# Patient Record
Sex: MALE | Race: ASIAN | Employment: OTHER | URBAN - METROPOLITAN AREA
[De-identification: names, ages, dates, MRNs, and addresses within clinical notes are randomized per-mention and may not be internally consistent; named-entity substitution may affect disease eponyms.]

---

## 2017-03-03 ENCOUNTER — OFFICE VISIT (OUTPATIENT)
Dept: FAMILY MEDICINE CLINIC | Facility: CLINIC | Age: 72
End: 2017-03-03

## 2017-03-03 VITALS
WEIGHT: 153 LBS | DIASTOLIC BLOOD PRESSURE: 78 MMHG | BODY MASS INDEX: 25 KG/M2 | HEART RATE: 67 BPM | SYSTOLIC BLOOD PRESSURE: 142 MMHG

## 2017-03-03 DIAGNOSIS — E11.9 DIABETES MELLITUS TYPE 2 IN NONOBESE (HCC): Primary | ICD-10-CM

## 2017-03-03 PROCEDURE — 99214 OFFICE O/P EST MOD 30 MIN: CPT | Performed by: FAMILY MEDICINE

## 2017-03-03 PROCEDURE — G0463 HOSPITAL OUTPT CLINIC VISIT: HCPCS | Performed by: FAMILY MEDICINE

## 2017-03-03 RX ORDER — IRBESARTAN AND HYDROCHLOROTHIAZIDE 300; 12.5 MG/1; MG/1
1 TABLET, FILM COATED ORAL DAILY
Qty: 90 TABLET | Refills: 3 | Status: SHIPPED | OUTPATIENT
Start: 2017-03-03 | End: 2017-05-02

## 2017-03-03 RX ORDER — IRBESARTAN AND HYDROCHLOROTHIAZIDE 300; 12.5 MG/1; MG/1
1 TABLET, FILM COATED ORAL DAILY
Qty: 90 TABLET | Refills: 3 | Status: CANCELLED | OUTPATIENT
Start: 2017-03-03

## 2017-03-03 RX ORDER — METFORMIN HYDROCHLORIDE 500 MG/1
500 TABLET, EXTENDED RELEASE ORAL NIGHTLY
Qty: 90 TABLET | Refills: 3 | Status: SHIPPED | OUTPATIENT
Start: 2017-03-03 | End: 2017-11-21

## 2017-03-03 RX ORDER — AMLODIPINE BESYLATE 10 MG/1
10 TABLET ORAL DAILY
Qty: 90 TABLET | Refills: 3 | Status: SHIPPED | OUTPATIENT
Start: 2017-03-03 | End: 2017-05-02

## 2017-03-04 ENCOUNTER — APPOINTMENT (OUTPATIENT)
Dept: LAB | Age: 72
End: 2017-03-04
Attending: FAMILY MEDICINE
Payer: MEDICARE

## 2017-03-04 DIAGNOSIS — E11.9 DIABETES MELLITUS TYPE 2 IN NONOBESE (HCC): ICD-10-CM

## 2017-03-04 LAB
ALBUMIN SERPL BCP-MCNC: 4.2 G/DL (ref 3.5–4.8)
ALBUMIN/GLOB SERPL: 1.2 {RATIO} (ref 1–2)
ALP SERPL-CCNC: 50 U/L (ref 32–100)
ALT SERPL-CCNC: 21 U/L (ref 17–63)
ANION GAP SERPL CALC-SCNC: 9 MMOL/L (ref 0–18)
AST SERPL-CCNC: 22 U/L (ref 15–41)
BILIRUB SERPL-MCNC: 1.1 MG/DL (ref 0.3–1.2)
BUN SERPL-MCNC: 21 MG/DL (ref 8–20)
BUN/CREAT SERPL: 17.8 (ref 10–20)
CALCIUM SERPL-MCNC: 9.5 MG/DL (ref 8.5–10.5)
CHLORIDE SERPL-SCNC: 99 MMOL/L (ref 95–110)
CHOLEST SERPL-MCNC: 190 MG/DL (ref 110–200)
CO2 SERPL-SCNC: 27 MMOL/L (ref 22–32)
CREAT SERPL-MCNC: 1.18 MG/DL (ref 0.5–1.5)
CREAT UR-MCNC: 134.9 MG/DL
GLOBULIN PLAS-MCNC: 3.5 G/DL (ref 2.5–3.7)
GLUCOSE SERPL-MCNC: 134 MG/DL (ref 70–99)
HBA1C MFR BLD: 6.6 % (ref 4–6)
HDLC SERPL-MCNC: 44 MG/DL
LDLC SERPL CALC-MCNC: 120 MG/DL (ref 0–99)
MICROALBUMIN UR-MCNC: 17.9 MG/DL (ref 0–1.8)
MICROALBUMIN/CREAT UR: 132.7 MG/G{CREAT} (ref 0–20)
NONHDLC SERPL-MCNC: 146 MG/DL
OSMOLALITY UR CALC.SUM OF ELEC: 285 MOSM/KG (ref 275–295)
POTASSIUM SERPL-SCNC: 4 MMOL/L (ref 3.3–5.1)
PROT SERPL-MCNC: 7.7 G/DL (ref 5.9–8.4)
SODIUM SERPL-SCNC: 135 MMOL/L (ref 136–144)
TRIGL SERPL-MCNC: 131 MG/DL (ref 1–149)

## 2017-03-04 PROCEDURE — 82570 ASSAY OF URINE CREATININE: CPT

## 2017-03-04 PROCEDURE — 82043 UR ALBUMIN QUANTITATIVE: CPT

## 2017-03-04 PROCEDURE — 83036 HEMOGLOBIN GLYCOSYLATED A1C: CPT

## 2017-03-04 PROCEDURE — 36415 COLL VENOUS BLD VENIPUNCTURE: CPT

## 2017-03-04 PROCEDURE — 80061 LIPID PANEL: CPT

## 2017-03-04 PROCEDURE — 80053 COMPREHEN METABOLIC PANEL: CPT

## 2017-03-20 ENCOUNTER — TELEPHONE (OUTPATIENT)
Dept: INTERNAL MEDICINE CLINIC | Facility: CLINIC | Age: 72
End: 2017-03-20

## 2017-04-24 PROBLEM — N18.1 CHRONIC KIDNEY DISEASE (CKD), STAGE I: Status: ACTIVE | Noted: 2017-04-24

## 2017-04-24 PROBLEM — I77.1 TORTUOUS AORTA (HCC): Status: ACTIVE | Noted: 2017-04-24

## 2017-04-24 PROBLEM — I10 HYPERTENSION: Status: ACTIVE | Noted: 2017-04-24

## 2017-04-24 PROBLEM — N52.9 ERECTILE DYSFUNCTION: Status: ACTIVE | Noted: 2017-04-24

## 2017-04-24 PROBLEM — I77.1 TORTUOUS AORTA: Status: ACTIVE | Noted: 2017-04-24

## 2017-04-24 PROBLEM — E11.21 DIABETIC NEPHROPATHY (HCC): Status: ACTIVE | Noted: 2017-04-24

## 2017-05-02 ENCOUNTER — OFFICE VISIT (OUTPATIENT)
Dept: FAMILY MEDICINE CLINIC | Facility: CLINIC | Age: 72
End: 2017-05-02

## 2017-05-02 VITALS
HEIGHT: 66 IN | BODY MASS INDEX: 25.88 KG/M2 | DIASTOLIC BLOOD PRESSURE: 68 MMHG | HEART RATE: 74 BPM | WEIGHT: 161 LBS | SYSTOLIC BLOOD PRESSURE: 117 MMHG

## 2017-05-02 DIAGNOSIS — I12.9 RENAL HYPERTENSION, STAGE 1-4 OR UNSPECIFIED CHRONIC KIDNEY DISEASE: Primary | ICD-10-CM

## 2017-05-02 DIAGNOSIS — E11.9 DIABETES MELLITUS TYPE 2 IN NONOBESE (HCC): ICD-10-CM

## 2017-05-02 DIAGNOSIS — E11.21 DIABETIC NEPHROPATHY ASSOCIATED WITH TYPE 2 DIABETES MELLITUS (HCC): ICD-10-CM

## 2017-05-02 DIAGNOSIS — I10 ESSENTIAL HYPERTENSION: ICD-10-CM

## 2017-05-02 DIAGNOSIS — Z00.00 ENCOUNTER FOR ANNUAL HEALTH EXAMINATION: ICD-10-CM

## 2017-05-02 DIAGNOSIS — I77.1 TORTUOUS AORTA (HCC): ICD-10-CM

## 2017-05-02 DIAGNOSIS — Z12.11 SCREENING FOR COLON CANCER: ICD-10-CM

## 2017-05-02 DIAGNOSIS — Z87.891 HISTORY OF TOBACCO ABUSE: ICD-10-CM

## 2017-05-02 DIAGNOSIS — N52.9 ERECTILE DYSFUNCTION, UNSPECIFIED ERECTILE DYSFUNCTION TYPE: ICD-10-CM

## 2017-05-02 DIAGNOSIS — E11.22 TYPE 2 DIABETES MELLITUS WITH STAGE 1 CHRONIC KIDNEY DISEASE, WITHOUT LONG-TERM CURRENT USE OF INSULIN (HCC): ICD-10-CM

## 2017-05-02 DIAGNOSIS — Z12.5 SCREENING FOR PROSTATE CANCER: ICD-10-CM

## 2017-05-02 DIAGNOSIS — N18.1 TYPE 2 DIABETES MELLITUS WITH STAGE 1 CHRONIC KIDNEY DISEASE, WITHOUT LONG-TERM CURRENT USE OF INSULIN (HCC): ICD-10-CM

## 2017-05-02 PROCEDURE — 96160 PT-FOCUSED HLTH RISK ASSMT: CPT | Performed by: FAMILY MEDICINE

## 2017-05-02 RX ORDER — AMLODIPINE BESYLATE 5 MG/1
5 TABLET ORAL DAILY
Qty: 90 TABLET | Refills: 3 | Status: SHIPPED | OUTPATIENT
Start: 2017-05-02 | End: 2017-11-21

## 2017-05-02 NOTE — PROGRESS NOTES
HPI:   Maddy Guevara is a 70year old male who presents for a Medicare Subsequent Annual Wellness visit (Pt already had Initial Annual Wellness).    Retired.   His last annual assessment has been over 1 year: Annual Physical due on 08/28/ has a past medical history of Unspecified essential hypertension; Osteoporosis (2010); and Diabetes ([address] He  has past surgical history that includes electrocardiogram, complete (08-).     His family history includes Diabetes in his mother; Heart restaurant:  No   I get confused about where sounds come from:  No I misunderstand some   words in a sentence and need to ask people to repeat themselves:  No   I especially have trouble understanding the speech of women and children:    No I have trouble Lymph nodes: Cervical, supraclavicular, and axillary nodes normal   Neurologic: Normal            SUGGESTED VACCINATIONS - Influenza, Pneumococcal, Zoster, Tetanus     Immunization History   Administered Date(s) Administered   • Fluzone Vaccine Medicare No    Has your appetite been poor?: No    How does the patient maintain a good energy level?: Appropriate Exercise    How would you describe your daily physical activity?: Moderate    How would you describe your current health state?: Good    How do you ma Please go to \"Cognitive Assessment\" under Medicare Assessment section in Charting, test patient and document. Then, refresh your progress note to see your input here.   Cognitive Assessment     What day of the week is this?: Correct    What month i or any previous visit.  Update Immunization Activity if applicable    Pneumoccocal 13 (Prevnar)   Orders placed or performed in visit on 03/08/16  -PNEUMOCOCCAL VACC, 13 ANTHONY IM         Pneumococcal 23 (Pneumovax) No orders found for this or any previous vis Team:  Irish Herrera DO as PCP - General (Family Practice)  Irish Herrera DO as PCP - Family Practitioner (Family Practice)  Maury Coronado DO (INFECTIOUS DISEASES)    Patient Active Problem List:     Type II diabetes mellitus ([facility]     Dysli mother. SOCIAL HISTORY:   He  reports that he has quit smoking. He does not have any smokeless tobacco history on file. He reports that he drinks alcohol. His drug history is not on file.      REVIEW OF SYSTEMS:   GENERAL: feels well otherwise  SKIN: umm meeting or place of Rastafarian:  No   Many people I talk to seem to mumble (or don't speak clearly):  No People   get annoyed because I misunderstand what they say:  No   I misunderstand what others are saying and make inappropriate responses:    No I avoid • Pneumococcal (Prevnar 13) 03/08/2016       ASSESSMENT AND OTHER RELEVANT CHRONIC CONDITIONS:   Kirstin Chacko is a 70year old male who presents for a Medicare Assessment.      PLAN SUMMARY:   Diagnoses and all orders for this visit:    Medicare a maintain a good energy level?: Appropriate Exercise    How would you describe your daily physical activity?: Moderate    How would you describe your current health state?: Good    How do you maintain positive mental well-being?: Visiting Friends    If you section in Charting, test patient and document. Then, refresh your progress note to see your input here.   Cognitive Assessment     What day of the week is this?: Correct    What month is it?: Correct    What year is it?: Correct    Recall \"Ball\": QKDN Pneumoccocal 13 (Prevnar)   Orders placed or performed in visit on 03/08/16  -PNEUMOCOCCAL VACC, 13 ANTHONY IM         Pneumococcal 23 (Pneumovax) No orders found for this or any previous visit.      Hepatitis B No orders found for this or any previous visit

## 2017-05-02 NOTE — PATIENT INSTRUCTIONS
Jonnathan Marks's SCREENING SCHEDULE   Tests on this list are recommended by your physician but may not be covered, or covered at this frequency, by your insurer. Please check with your insurance carrier before scheduling to verify coverage.     PREV Abdominal aortic aneurysm screening (once between ages 73-68)  No results found for this or any previous visit.  Limited to patients who meet one of the following criteria:   • Men who are 73-68 years old and have smoked more than 100 cigarettes in their li received Factor VIII or IX concentrates   Clients of institutions for the mentally retarded   Persons who live in the same house as a HepB virus carrier   Homosexual men   Illicit injectable drug abusers     Tetanus Toxoid- Only covered with a cut with met flowsheet data found.     Fasting Blood Sugar (FSB)   Patient must be diagnosed with one of the following:   • Hypertension   • Dyslipidemia   • Obesity (BMI ³30 kg/m2)   • Previous elevated impaired FBS or GTT   … or any two of the following:   • Overweigh found. Fecal Occult Blood   Covered Annually No results found for: FOB, OCCULTSTOOL No flowsheet data found.      Barium Enema-   uncomfortable but covered  Covered but uncomfortable   Glaucoma Screening      Ophthalmology Visit   Covered annually for D Recommended Websites for Advanced Directives    [email or web address] org/publications/[email or web address]/personal_[email or web address]. pdf  An information packet, including necessary form from the [employer] website. http://www. [place]state. [place]/public/books/a

## 2017-05-18 ENCOUNTER — HOSPITAL ENCOUNTER (OUTPATIENT)
Dept: ULTRASOUND IMAGING | Age: 72
Discharge: HOME | End: 2017-05-18
Attending: FAMILY MEDICINE
Payer: MEDICARE

## 2017-05-18 DIAGNOSIS — Z87.891 HISTORY OF TOBACCO ABUSE: ICD-10-CM

## 2017-05-18 DIAGNOSIS — I10 ESSENTIAL HYPERTENSION: ICD-10-CM

## 2017-05-18 PROCEDURE — 76770 US EXAM ABDO BACK WALL COMP: CPT | Performed by: FAMILY MEDICINE

## 2017-08-02 ENCOUNTER — APPOINTMENT (OUTPATIENT)
Dept: LAB | Age: 72
End: 2017-08-02
Attending: FAMILY MEDICINE
Payer: MEDICARE

## 2017-08-02 DIAGNOSIS — E11.9 DIABETES MELLITUS TYPE 2 IN NONOBESE (HCC): ICD-10-CM

## 2017-08-02 DIAGNOSIS — Z12.5 SCREENING FOR PROSTATE CANCER: ICD-10-CM

## 2017-08-02 LAB
ALBUMIN SERPL BCP-MCNC: 4.2 G/DL (ref 3.5–4.8)
ALBUMIN/GLOB SERPL: 1.4 {RATIO} (ref 1–2)
ALP SERPL-CCNC: 50 U/L (ref 32–100)
ALT SERPL-CCNC: 16 U/L (ref 17–63)
ANION GAP SERPL CALC-SCNC: 8 MMOL/L (ref 0–18)
AST SERPL-CCNC: 20 U/L (ref 15–41)
BILIRUB SERPL-MCNC: 1.1 MG/DL (ref 0.3–1.2)
BUN SERPL-MCNC: 15 MG/DL (ref 8–20)
BUN/CREAT SERPL: 15.8 (ref 10–20)
CALCIUM SERPL-MCNC: 9.4 MG/DL (ref 8.5–10.5)
CHLORIDE SERPL-SCNC: 99 MMOL/L (ref 95–110)
CHOLEST SERPL-MCNC: 175 MG/DL (ref 110–200)
CO2 SERPL-SCNC: 27 MMOL/L (ref 22–32)
CREAT SERPL-MCNC: 0.95 MG/DL (ref 0.5–1.5)
CREAT UR-MCNC: 22.1 MG/DL
GLOBULIN PLAS-MCNC: 3.1 G/DL (ref 2.5–3.7)
GLUCOSE SERPL-MCNC: 115 MG/DL (ref 70–99)
HBA1C MFR BLD: 6.4 % (ref 4–6)
HDLC SERPL-MCNC: 40 MG/DL
LDLC SERPL CALC-MCNC: 105 MG/DL (ref 0–99)
MICROALBUMIN UR-MCNC: 2.5 MG/DL (ref 0–1.8)
MICROALBUMIN/CREAT UR: 113.1 MG/G{CREAT} (ref 0–20)
NONHDLC SERPL-MCNC: 135 MG/DL
OSMOLALITY UR CALC.SUM OF ELEC: 280 MOSM/KG (ref 275–295)
POTASSIUM SERPL-SCNC: 4.1 MMOL/L (ref 3.3–5.1)
PROT SERPL-MCNC: 7.3 G/DL (ref 5.9–8.4)
PSA SERPL-MCNC: 0.7 NG/ML (ref 0–4)
SODIUM SERPL-SCNC: 134 MMOL/L (ref 136–144)
TRIGL SERPL-MCNC: 152 MG/DL (ref 1–149)

## 2017-08-02 PROCEDURE — 80061 LIPID PANEL: CPT

## 2017-08-02 PROCEDURE — 82570 ASSAY OF URINE CREATININE: CPT

## 2017-08-02 PROCEDURE — 80053 COMPREHEN METABOLIC PANEL: CPT

## 2017-08-02 PROCEDURE — 83036 HEMOGLOBIN GLYCOSYLATED A1C: CPT

## 2017-08-02 PROCEDURE — 82043 UR ALBUMIN QUANTITATIVE: CPT

## 2017-08-02 PROCEDURE — 36415 COLL VENOUS BLD VENIPUNCTURE: CPT

## 2017-11-21 ENCOUNTER — OFFICE VISIT (OUTPATIENT)
Dept: FAMILY MEDICINE CLINIC | Facility: CLINIC | Age: 72
End: 2017-11-21

## 2017-11-21 VITALS
BODY MASS INDEX: 24 KG/M2 | WEIGHT: 149 LBS | SYSTOLIC BLOOD PRESSURE: 145 MMHG | HEART RATE: 73 BPM | DIASTOLIC BLOOD PRESSURE: 72 MMHG

## 2017-11-21 DIAGNOSIS — E11.9 TYPE 2 DIABETES MELLITUS WITHOUT COMPLICATION, WITHOUT LONG-TERM CURRENT USE OF INSULIN (HCC): ICD-10-CM

## 2017-11-21 DIAGNOSIS — Z23 NEED FOR VACCINATION: Primary | ICD-10-CM

## 2017-11-21 PROCEDURE — 90653 IIV ADJUVANT VACCINE IM: CPT | Performed by: FAMILY MEDICINE

## 2017-11-21 PROCEDURE — 99214 OFFICE O/P EST MOD 30 MIN: CPT | Performed by: FAMILY MEDICINE

## 2017-11-21 PROCEDURE — G0008 ADMIN INFLUENZA VIRUS VAC: HCPCS | Performed by: FAMILY MEDICINE

## 2017-11-21 PROCEDURE — G0463 HOSPITAL OUTPT CLINIC VISIT: HCPCS | Performed by: FAMILY MEDICINE

## 2017-11-21 RX ORDER — AMLODIPINE BESYLATE 5 MG/1
5 TABLET ORAL DAILY
Qty: 90 TABLET | Refills: 3 | Status: SHIPPED | OUTPATIENT
Start: 2017-11-21 | End: 2018-04-06

## 2017-11-21 RX ORDER — IRBESARTAN AND HYDROCHLOROTHIAZIDE 300; 12.5 MG/1; MG/1
1 TABLET, FILM COATED ORAL DAILY
Qty: 90 TABLET | Refills: 3 | Status: SHIPPED | OUTPATIENT
Start: 2017-11-21 | End: 2018-04-04

## 2017-11-21 RX ORDER — SILDENAFIL 100 MG/1
100 TABLET, FILM COATED ORAL
Qty: 4 TABLET | Refills: 11 | Status: SHIPPED | OUTPATIENT
Start: 2017-11-21 | End: 2018-04-19

## 2017-11-21 RX ORDER — METFORMIN HYDROCHLORIDE 500 MG/1
500 TABLET, EXTENDED RELEASE ORAL NIGHTLY
Qty: 90 TABLET | Refills: 3 | Status: SHIPPED | OUTPATIENT
Start: 2017-11-21 | End: 2018-03-20

## 2017-11-21 NOTE — PROGRESS NOTES
Diabetic Visit  Doing well  Stopped eating rice. Patient denies problems with their medication. Denies ulcers, chest pain, dyspnea on exertion.     Ht rrr no M no S3 S4  Lung clear no wheeze  abd soft nontender  Carotids without bruit  Ext normal mono

## 2017-12-01 ENCOUNTER — APPOINTMENT (OUTPATIENT)
Dept: LAB | Age: 72
End: 2017-12-01
Attending: FAMILY MEDICINE
Payer: MEDICARE

## 2017-12-01 PROCEDURE — 83036 HEMOGLOBIN GLYCOSYLATED A1C: CPT | Performed by: FAMILY MEDICINE

## 2017-12-01 PROCEDURE — 82570 ASSAY OF URINE CREATININE: CPT | Performed by: FAMILY MEDICINE

## 2017-12-01 PROCEDURE — 36415 COLL VENOUS BLD VENIPUNCTURE: CPT | Performed by: FAMILY MEDICINE

## 2017-12-01 PROCEDURE — 82043 UR ALBUMIN QUANTITATIVE: CPT | Performed by: FAMILY MEDICINE

## 2017-12-01 PROCEDURE — 80061 LIPID PANEL: CPT | Performed by: FAMILY MEDICINE

## 2017-12-01 PROCEDURE — 80053 COMPREHEN METABOLIC PANEL: CPT | Performed by: FAMILY MEDICINE

## 2017-12-02 ENCOUNTER — TELEPHONE (OUTPATIENT)
Dept: OTHER | Age: 72
End: 2017-12-02

## 2017-12-02 DIAGNOSIS — Z13.29 THYROID DISORDER SCREENING: ICD-10-CM

## 2017-12-02 DIAGNOSIS — E11.9 DIABETES MELLITUS TYPE 2, NONINSULIN DEPENDENT (HCC): ICD-10-CM

## 2017-12-02 DIAGNOSIS — E78.5 HYPERLIPIDEMIA, UNSPECIFIED HYPERLIPIDEMIA TYPE: Primary | ICD-10-CM

## 2017-12-02 NOTE — TELEPHONE ENCOUNTER
Reviewed lab results with pt along with Dr [facility] recommendations. Pt verbalized understanding and agreed with md plan. New labs orders generated and copies mailed to pts' home address on file to complete Feb 28th, 2018 in 12 hour fasting state.

## 2017-12-02 NOTE — TELEPHONE ENCOUNTER
----- Message from Willard Heath DO sent at 12/1/2017  2:42 PM CST -----  Chol and diabetse are well controlled. Sodium low. liberalize salt intake  Repeat diabetic panel with tsh in 3 mo.

## 2018-02-08 ENCOUNTER — TELEPHONE (OUTPATIENT)
Dept: FAMILY MEDICINE CLINIC | Facility: CLINIC | Age: 73
End: 2018-02-08

## 2018-02-08 NOTE — TELEPHONE ENCOUNTER
Pt informed is eligible for Medicare Annual Health Assessment appointment and offered assistance in scheduling, [place] to schedule.

## 2018-03-21 RX ORDER — METFORMIN HYDROCHLORIDE 500 MG/1
TABLET, EXTENDED RELEASE ORAL
Qty: 90 TABLET | Refills: 0 | Status: SHIPPED | OUTPATIENT
Start: 2018-03-21 | End: 2018-04-06

## 2018-03-21 NOTE — TELEPHONE ENCOUNTER
Diabetes Medications  Protocol Criteria:  · Appointment scheduled in the past 6 months or the next 3 months  · A1C < 7.5 in the past 6 months  · Creatinine in the past 12 months  · Creatinine result < 1.5   Recent Outpatient Visits            4 months ago

## 2018-04-04 RX ORDER — IRBESARTAN AND HYDROCHLOROTHIAZIDE 300; 12.5 MG/1; MG/1
1 TABLET, FILM COATED ORAL DAILY
Qty: 30 TABLET | Refills: 0 | Status: SHIPPED | OUTPATIENT
Start: 2018-04-04 | End: 2018-04-11

## 2018-04-04 NOTE — TELEPHONE ENCOUNTER
Patient requesting refill request for     Irbesartan-Hydrochlorothiazide 300-12.5 MG Oral Tab Take 1 tablet by mouth daily. Disp: 90 tablet Rfl: 3     Patient states [facility] pharmacy advised Dr. Ramin Houston put hold on the medication.      Patient is running ou

## 2018-04-04 NOTE — TELEPHONE ENCOUNTER
Pt called back & stated Nikki Hernández is putting a hold on his rx ref for irbesartan hctz, he is not sure why. Pt informed last ref was on 11-21-17 # 90 +3. He will verify with them why they are not sending it, pt has 4 pills left.     Pt is now requesting a mon Refills    Irbesartan-Hydrochlorothiazide 300-12.5 MG Oral Tab 30 tablet 0     Sig: Take 1 tablet by mouth daily. LR 11-21-17 # 90 +3  Refill approved per protocol.     Future Appointments  Date Time Provider Kasey Amin   4/17/2018 1:10 PM Ce

## 2018-04-09 RX ORDER — METFORMIN HYDROCHLORIDE 500 MG/1
TABLET, EXTENDED RELEASE ORAL
Qty: 90 TABLET | Refills: 0 | Status: SHIPPED | OUTPATIENT
Start: 2018-04-09 | End: 2018-04-19

## 2018-04-09 RX ORDER — AMLODIPINE BESYLATE 5 MG/1
TABLET ORAL
Qty: 90 TABLET | Refills: 0 | Status: SHIPPED | OUTPATIENT
Start: 2018-04-09 | End: 2018-04-19

## 2018-04-09 NOTE — TELEPHONE ENCOUNTER
Hypertensive Medications  Protocol Criteria:  · Appointment scheduled in the past 6 months or in the next 3 months  · BMP or CMP in the past 12 months  · Creatinine result < 2  Recent Outpatient Visits            4 months ago Need for vaccination    Yen Pickett months ago Renal hypertension, stage 1-4 or unspecified chronic kidney disease    [facility], [place] [address], [address] [place] [address]    Office Visit    1 year ago Diabetes mellitus type 2 in nonobese [facility])    [facility], [place] [address], [place]

## 2018-04-10 NOTE — TELEPHONE ENCOUNTER
Rec'd call from [address] for refill of Irbesartan-HCTZ. The pt's current prescription  last month. (order was written on 17 for 90 tab/3 refills)     Is pt to continue this med? Prescription is pended, please advise.

## 2018-04-10 NOTE — TELEPHONE ENCOUNTER
Pt request refill for meds below to be refilled at [facility] mail order on file. •  Irbesartan-Hydrochlorothiazide 300-12.5 MG Oral Tab, Take 1 tablet by mouth daily. , Disp: 30 tablet, Rfl: 0

## 2018-04-11 RX ORDER — IRBESARTAN AND HYDROCHLOROTHIAZIDE 300; 12.5 MG/1; MG/1
1 TABLET, FILM COATED ORAL DAILY
Qty: 90 TABLET | Refills: 0 | Status: SHIPPED | OUTPATIENT
Start: 2018-04-11 | End: 2018-04-19

## 2018-04-11 NOTE — TELEPHONE ENCOUNTER
Refilled per Epic protocol.     Hypertensive Medications  Protocol Criteria:  · Appointment scheduled in the past 6 months or in the next 3 months  · BMP or CMP in the past 12 months  · Creatinine result < 2  Recent Outpatient Visits            4 months ago

## 2018-04-12 RX ORDER — IRBESARTAN AND HYDROCHLOROTHIAZIDE 300; 12.5 MG/1; MG/1
1 TABLET, FILM COATED ORAL DAILY
Qty: 90 TABLET | Refills: 11 | Status: SHIPPED | OUTPATIENT
Start: 2018-04-12 | End: 2018-04-19

## 2018-04-19 ENCOUNTER — OFFICE VISIT (OUTPATIENT)
Dept: FAMILY MEDICINE CLINIC | Facility: CLINIC | Age: 73
End: 2018-04-19

## 2018-04-19 VITALS
HEART RATE: 73 BPM | SYSTOLIC BLOOD PRESSURE: 130 MMHG | TEMPERATURE: 97 F | HEIGHT: 66 IN | WEIGHT: 148 LBS | DIASTOLIC BLOOD PRESSURE: 83 MMHG | BODY MASS INDEX: 23.78 KG/M2

## 2018-04-19 DIAGNOSIS — I10 ESSENTIAL HYPERTENSION: ICD-10-CM

## 2018-04-19 DIAGNOSIS — I77.1 TORTUOUS AORTA (HCC): ICD-10-CM

## 2018-04-19 DIAGNOSIS — E11.21 DIABETIC NEPHROPATHY ASSOCIATED WITH TYPE 2 DIABETES MELLITUS (HCC): Primary | ICD-10-CM

## 2018-04-19 DIAGNOSIS — Z23 NEED FOR VACCINATION: ICD-10-CM

## 2018-04-19 PROCEDURE — G0009 ADMIN PNEUMOCOCCAL VACCINE: HCPCS | Performed by: FAMILY MEDICINE

## 2018-04-19 PROCEDURE — 99212 OFFICE O/P EST SF 10 MIN: CPT | Performed by: FAMILY MEDICINE

## 2018-04-19 PROCEDURE — 90732 PPSV23 VACC 2 YRS+ SUBQ/IM: CPT | Performed by: FAMILY MEDICINE

## 2018-04-19 PROCEDURE — 99214 OFFICE O/P EST MOD 30 MIN: CPT | Performed by: FAMILY MEDICINE

## 2018-04-19 RX ORDER — AMLODIPINE BESYLATE 5 MG/1
5 TABLET ORAL
Qty: 90 TABLET | Refills: 3 | Status: SHIPPED | OUTPATIENT
Start: 2018-04-19 | End: 2019-03-19

## 2018-04-19 RX ORDER — SILDENAFIL 100 MG/1
100 TABLET, FILM COATED ORAL
Qty: 8 TABLET | Refills: 11 | Status: SHIPPED | OUTPATIENT
Start: 2018-04-19 | End: 2020-09-24 | Stop reason: ALTCHOICE

## 2018-04-19 RX ORDER — IRBESARTAN AND HYDROCHLOROTHIAZIDE 300; 12.5 MG/1; MG/1
1 TABLET, FILM COATED ORAL DAILY
Qty: 90 TABLET | Refills: 3 | Status: SHIPPED | OUTPATIENT
Start: 2018-04-19 | End: 2019-03-19

## 2018-04-19 RX ORDER — METFORMIN HYDROCHLORIDE 500 MG/1
TABLET, EXTENDED RELEASE ORAL
Qty: 90 TABLET | Refills: 3 | Status: SHIPPED | OUTPATIENT
Start: 2018-04-19 | End: 2019-03-05

## 2018-04-19 NOTE — PROGRESS NOTES
Here for f/u on htn and diabetes. \"My sugars are good during the day and are slightly high in the morning (110-130's)\"  No hypoglycemic episodes  Patient has lost some weight  Has been following his diabetic diet.     Patient's [facility]  Continue on htn medication.     4. Need for vaccination  given  - PNEUMOCOCCAL IMM (PNEUMOVAX)

## 2018-05-01 ENCOUNTER — APPOINTMENT (OUTPATIENT)
Dept: LAB | Age: 73
End: 2018-05-01
Attending: FAMILY MEDICINE
Payer: MEDICARE

## 2018-05-01 PROCEDURE — 36415 COLL VENOUS BLD VENIPUNCTURE: CPT | Performed by: FAMILY MEDICINE

## 2018-05-01 PROCEDURE — 80061 LIPID PANEL: CPT | Performed by: FAMILY MEDICINE

## 2018-05-01 PROCEDURE — 83036 HEMOGLOBIN GLYCOSYLATED A1C: CPT | Performed by: FAMILY MEDICINE

## 2018-05-01 PROCEDURE — 80053 COMPREHEN METABOLIC PANEL: CPT | Performed by: FAMILY MEDICINE

## 2018-05-01 PROCEDURE — 82570 ASSAY OF URINE CREATININE: CPT | Performed by: FAMILY MEDICINE

## 2018-05-01 PROCEDURE — 84443 ASSAY THYROID STIM HORMONE: CPT | Performed by: FAMILY MEDICINE

## 2018-05-01 PROCEDURE — 82043 UR ALBUMIN QUANTITATIVE: CPT | Performed by: FAMILY MEDICINE

## 2018-05-11 ENCOUNTER — TELEPHONE (OUTPATIENT)
Dept: OTHER | Age: 73
End: 2018-05-11

## 2018-05-11 DIAGNOSIS — E78.00 ELEVATED CHOLESTEROL: Primary | ICD-10-CM

## 2018-05-11 RX ORDER — ROSUVASTATIN CALCIUM 5 MG/1
5 TABLET, COATED ORAL NIGHTLY
Qty: 90 TABLET | Refills: 3 | Status: SHIPPED | OUTPATIENT
Start: 2018-05-11 | End: 2018-09-12

## 2018-05-11 NOTE — TELEPHONE ENCOUNTER
Pt was inform of Dr. Linn 16 message below and he verbalized understanding. I have ordered he labs and medication.     From: Ramone Wesley DO   Sent: 5/8/2018   8:07 AM   To: Em Fm Lmb Lpn/Cma     Patient's kidney function remains stable.  Thyroid was nor

## 2018-07-12 ENCOUNTER — OFFICE VISIT (OUTPATIENT)
Dept: FAMILY MEDICINE CLINIC | Facility: CLINIC | Age: 73
End: 2018-07-12

## 2018-07-12 VITALS
DIASTOLIC BLOOD PRESSURE: 86 MMHG | SYSTOLIC BLOOD PRESSURE: 144 MMHG | HEART RATE: 69 BPM | BODY MASS INDEX: 23.78 KG/M2 | WEIGHT: 148 LBS | HEIGHT: 66 IN

## 2018-07-12 DIAGNOSIS — B35.4 TINEA CORPORIS: ICD-10-CM

## 2018-07-12 DIAGNOSIS — B36.0 TINEA VERSICOLOR: Primary | ICD-10-CM

## 2018-07-12 PROCEDURE — 99213 OFFICE O/P EST LOW 20 MIN: CPT | Performed by: NURSE PRACTITIONER

## 2018-07-12 RX ORDER — SELENIUM SULFIDE 2.5 MG/100ML
LOTION TOPICAL
Qty: 150 ML | Refills: 3 | Status: SHIPPED | OUTPATIENT
Start: 2018-07-12 | End: 2020-06-03 | Stop reason: ALTCHOICE

## 2018-07-12 NOTE — PROGRESS NOTES
HPI  Pt here for rash on bilat forearms and left hair line; rash on legs are scaly. S/s started about 3 weeks ago. Review of Systems   Constitutional: Negative for activity change, fatigue and fever.    Respiratory: Negative for cough, chest tightness and MetFORMIN HCl  MG Oral Tablet 24 Hr TAKE 1 TABLET EVERY NIGHT Disp: 90 tablet Rfl: 3   Irbesartan-Hydrochlorothiazide 300-12.5 MG Oral Tab Take 1 tablet by mouth daily.  Disp: 90 tablet Rfl: 3   AmLODIPine Besylate 5 MG Oral Tab Take 1 tablet (5 mg to

## 2018-08-22 ENCOUNTER — NURSE TRIAGE (OUTPATIENT)
Dept: OTHER | Age: 73
End: 2018-08-22

## 2018-08-22 DIAGNOSIS — B36.0 TINEA VERSICOLOR: Primary | ICD-10-CM

## 2018-08-22 DIAGNOSIS — B35.4 TINEA CORPORIS: ICD-10-CM

## 2018-08-22 NOTE — TELEPHONE ENCOUNTER
Action Requested: Summary for Provider     []  Critical Lab, Recommendations Needed  [] Need Additional Advice  []   FYI    []   Need Orders  [] Need Medications Sent to Pharmacy  []  Other     SUMMARY: LOV 07/12 for same rash.   Pt was given lotion which h

## 2018-08-22 NOTE — TELEPHONE ENCOUNTER
Pts' dermatology referral approved, pt informed and given derm tel#[phone] to proceed with appt. Pt agreed with plan.

## 2018-09-07 ENCOUNTER — APPOINTMENT (OUTPATIENT)
Dept: LAB | Age: 73
End: 2018-09-07
Attending: FAMILY MEDICINE
Payer: MEDICARE

## 2018-09-07 DIAGNOSIS — E78.00 ELEVATED CHOLESTEROL: ICD-10-CM

## 2018-09-07 PROCEDURE — 80053 COMPREHEN METABOLIC PANEL: CPT

## 2018-09-07 PROCEDURE — 83036 HEMOGLOBIN GLYCOSYLATED A1C: CPT

## 2018-09-07 PROCEDURE — 82570 ASSAY OF URINE CREATININE: CPT

## 2018-09-07 PROCEDURE — 80061 LIPID PANEL: CPT

## 2018-09-07 PROCEDURE — 82043 UR ALBUMIN QUANTITATIVE: CPT

## 2018-09-07 PROCEDURE — 36415 COLL VENOUS BLD VENIPUNCTURE: CPT

## 2018-09-11 ENCOUNTER — TELEPHONE (OUTPATIENT)
Dept: FAMILY MEDICINE CLINIC | Facility: CLINIC | Age: 73
End: 2018-09-11

## 2018-09-11 DIAGNOSIS — E78.00 ELEVATED CHOLESTEROL: Primary | ICD-10-CM

## 2018-09-11 NOTE — TELEPHONE ENCOUNTER
Spoke with patient and informed him of his results from below and of the plan of care. Patient voiced understanding and confirmed he stopped taking his rosuvastatin some time ago because it was giving him leg pains.  Message routed to provider to confirm if

## 2018-09-11 NOTE — TELEPHONE ENCOUNTER
----- Message from Amanda Small DO sent at 9/7/2018  3:04 PM CDT -----  Blood sugar was under control continue on the current medication regimen cholesterol showed mildly elevated bad cholesterol.   Increase rosuvastatin to 10 mg 1 p.o. nightly #90 wi

## 2018-09-12 RX ORDER — SIMVASTATIN 5 MG
5 TABLET ORAL NIGHTLY
Qty: 90 TABLET | Refills: 3 | Status: SHIPPED | OUTPATIENT
Start: 2018-09-12 | End: 2019-05-06

## 2018-09-12 NOTE — TELEPHONE ENCOUNTER
Pt informed. Verbalized good understanding of all with intent to comply. Pharmacy confirmed. rx sent.

## 2018-09-13 ENCOUNTER — OFFICE VISIT (OUTPATIENT)
Dept: DERMATOLOGY CLINIC | Facility: CLINIC | Age: 73
End: 2018-09-13

## 2018-09-13 DIAGNOSIS — L30.9 DERMATITIS: ICD-10-CM

## 2018-09-13 DIAGNOSIS — L80 VITILIGO: Primary | ICD-10-CM

## 2018-09-13 PROCEDURE — 99202 OFFICE O/P NEW SF 15 MIN: CPT | Performed by: DERMATOLOGY

## 2018-09-13 RX ORDER — KETOCONAZOLE 20 MG/G
CREAM TOPICAL
COMMUNITY
Start: 2018-08-08 | End: 2020-06-03 | Stop reason: ALTCHOICE

## 2018-09-24 NOTE — PROGRESS NOTES
Es Christianson is a 68year old male.     Patient presents with:  Derm Problem: NEW PT here for eval of white blotches on his skin he noticed about 2 mo ago states he was seen by his PCP and was given Selenium Sulfidie 2.5% states he hasnt noticed a per week for 2 weeks then once a week as needed Disp: 150 mL Rfl: 3   Sildenafil Citrate 100 MG Oral Tab Take 1 tablet (100 mg total) by mouth daily as needed for Erectile Dysfunction.  Disp: 8 tablet Rfl: 11   MetFORMIN HCl  MG Oral Tablet 24 Hr TAKE (Coffee, Tea) 2 cups daily        Occupational Exposure: Not Asked        Hobby Hazards: Not Asked        Sleep Concern: Not Asked        Stress Concern: Not Asked        Weight Concern: Not Asked        Special Diet: Not Asked        Back Care: Not Asked hair, external eyes, including conjunctival mucosa, eyelids, oral mucosa, external ears, back, chest, abdomen, bilateral arms, bilateral legs, palms.         Remarkable for multiple depigmented patches over left clavicle left forearm    Exam otherwise signi Requested Prescriptions     Signed Prescriptions Disp Refills   • triamcinolone acetonide 0.1 % External Cream 454 g 3     Sig: Apply topically 2 (two) times daily.  Apply bid       Vitiligo  (primary encounter diagnosis)    No orders of the defined types

## 2018-11-15 ENCOUNTER — TELEPHONE (OUTPATIENT)
Dept: FAMILY MEDICINE CLINIC | Facility: CLINIC | Age: 73
End: 2018-11-15

## 2018-11-15 DIAGNOSIS — L80 VITILIGO: Primary | ICD-10-CM

## 2018-11-15 NOTE — TELEPHONE ENCOUNTER
Dr. Karri Castillo,    Patient called requesting a referral for Derm. Please advise and sign off on referral if you agree.       Thank you, Juliette Wesley Small-Referral Specialist.

## 2019-01-22 ENCOUNTER — PATIENT OUTREACH (OUTPATIENT)
Dept: CASE MANAGEMENT | Age: 74
End: 2019-01-22

## 2019-01-22 NOTE — PROGRESS NOTES
Outreached to patient in regards to scheduling Medicare Annual exam (AHA). Left message for patient to return my call at ext. [phone]. Patient is eligible April 20019. Thank you.

## 2019-02-14 ENCOUNTER — PATIENT OUTREACH (OUTPATIENT)
Dept: CASE MANAGEMENT | Age: 74
End: 2019-02-14

## 2019-02-14 NOTE — PROGRESS NOTES
Second outreached to patient in regards to scheduling Medicare Annual exam (AHA). Left message for patient to return my call at ext. [phone]. Patient is eligible in April. Thank you.

## 2019-03-05 RX ORDER — METFORMIN HYDROCHLORIDE 500 MG/1
TABLET, EXTENDED RELEASE ORAL
Qty: 90 TABLET | Refills: 3 | Status: SHIPPED | OUTPATIENT
Start: 2019-03-05 | End: 2019-07-07

## 2019-03-19 ENCOUNTER — OFFICE VISIT (OUTPATIENT)
Dept: FAMILY MEDICINE CLINIC | Facility: CLINIC | Age: 74
End: 2019-03-19
Payer: MEDICARE

## 2019-03-19 VITALS
SYSTOLIC BLOOD PRESSURE: 153 MMHG | TEMPERATURE: 98 F | WEIGHT: 141 LBS | DIASTOLIC BLOOD PRESSURE: 75 MMHG | BODY MASS INDEX: 23 KG/M2 | HEART RATE: 74 BPM

## 2019-03-19 DIAGNOSIS — I77.1 TORTUOUS AORTA (HCC): ICD-10-CM

## 2019-03-19 DIAGNOSIS — E11.21 DIABETIC NEPHROPATHY ASSOCIATED WITH TYPE 2 DIABETES MELLITUS (HCC): ICD-10-CM

## 2019-03-19 DIAGNOSIS — N52.9 ERECTILE DYSFUNCTION, UNSPECIFIED ERECTILE DYSFUNCTION TYPE: ICD-10-CM

## 2019-03-19 DIAGNOSIS — I10 ESSENTIAL HYPERTENSION: ICD-10-CM

## 2019-03-19 DIAGNOSIS — E11.9 TYPE 2 DIABETES MELLITUS WITHOUT COMPLICATION, WITHOUT LONG-TERM CURRENT USE OF INSULIN (HCC): ICD-10-CM

## 2019-03-19 DIAGNOSIS — E78.00 ELEVATED CHOLESTEROL: ICD-10-CM

## 2019-03-19 DIAGNOSIS — K13.0 DRY LIPS: Primary | ICD-10-CM

## 2019-03-19 DIAGNOSIS — L80 VITILIGO: ICD-10-CM

## 2019-03-19 PROCEDURE — G0463 HOSPITAL OUTPT CLINIC VISIT: HCPCS | Performed by: FAMILY MEDICINE

## 2019-03-19 PROCEDURE — 99215 OFFICE O/P EST HI 40 MIN: CPT | Performed by: FAMILY MEDICINE

## 2019-03-19 RX ORDER — IRBESARTAN 300 MG/1
300 TABLET ORAL NIGHTLY
Qty: 90 TABLET | Refills: 3 | Status: SHIPPED | OUTPATIENT
Start: 2019-03-19 | End: 2020-03-03

## 2019-03-19 RX ORDER — IRBESARTAN 300 MG/1
300 TABLET ORAL NIGHTLY
Qty: 90 TABLET | Refills: 3 | Status: SHIPPED | OUTPATIENT
Start: 2019-03-19 | End: 2019-03-19

## 2019-03-19 RX ORDER — AMLODIPINE BESYLATE 10 MG/1
10 TABLET ORAL DAILY
Qty: 90 TABLET | Refills: 3 | Status: SHIPPED | OUTPATIENT
Start: 2019-03-19 | End: 2020-03-03

## 2019-03-19 RX ORDER — AMLODIPINE BESYLATE 10 MG/1
10 TABLET ORAL DAILY
Qty: 90 TABLET | Refills: 3 | Status: SHIPPED | OUTPATIENT
Start: 2019-03-19 | End: 2019-03-19

## 2019-03-19 NOTE — PROGRESS NOTES
Dry lips  Dry mouth dry mallorie 1 mo worseing    Patient reports that sugars have been okay. Nothing above 150 at home. Patient states blood pressure normally is in the 140s 150s. It never gets into the 120s like.   No shortness of breath no chest pain no posterior cervical adenopathy or supraclavicular adenopathy palpated  Extremities there was no cyanosis or edema      Assessment/Plan    1.  Dry lips  Check for Sjogren's suspect from the hydrochlorothiazide will discontinue the hydrochlorothiazide increase

## 2019-03-25 ENCOUNTER — LAB ENCOUNTER (OUTPATIENT)
Dept: LAB | Age: 74
End: 2019-03-25
Attending: FAMILY MEDICINE
Payer: MEDICARE

## 2019-03-25 DIAGNOSIS — K13.0 DRY LIPS: ICD-10-CM

## 2019-03-25 DIAGNOSIS — E11.9 TYPE 2 DIABETES MELLITUS WITHOUT COMPLICATION, WITHOUT LONG-TERM CURRENT USE OF INSULIN (HCC): ICD-10-CM

## 2019-03-25 DIAGNOSIS — E78.00 ELEVATED CHOLESTEROL: ICD-10-CM

## 2019-03-25 LAB
ALBUMIN SERPL-MCNC: 3.9 G/DL (ref 3.4–5)
ALBUMIN/GLOB SERPL: 1.1 {RATIO} (ref 1–2)
ALP LIVER SERPL-CCNC: 62 U/L (ref 45–117)
ALT SERPL-CCNC: 24 U/L (ref 16–61)
ANION GAP SERPL CALC-SCNC: 5 MMOL/L (ref 0–18)
AST SERPL-CCNC: 14 U/L (ref 15–37)
BASOPHILS # BLD AUTO: 0.03 X10(3) UL (ref 0–0.2)
BASOPHILS NFR BLD AUTO: 0.7 %
BILIRUB SERPL-MCNC: 0.7 MG/DL (ref 0.1–2)
BUN BLD-MCNC: 17 MG/DL (ref 7–18)
BUN/CREAT SERPL: 18.9 (ref 10–20)
CALCIUM BLD-MCNC: 8.9 MG/DL (ref 8.5–10.1)
CHLORIDE SERPL-SCNC: 106 MMOL/L (ref 98–107)
CHOLEST SMN-MCNC: 164 MG/DL (ref ?–200)
CO2 SERPL-SCNC: 28 MMOL/L (ref 21–32)
CREAT BLD-MCNC: 0.9 MG/DL (ref 0.7–1.3)
CREAT UR-SCNC: 121 MG/DL
DEPRECATED RDW RBC AUTO: 37.8 FL (ref 35.1–46.3)
EOSINOPHIL # BLD AUTO: 0.26 X10(3) UL (ref 0–0.7)
EOSINOPHIL NFR BLD AUTO: 5.7 %
ERYTHROCYTE [DISTWIDTH] IN BLOOD BY AUTOMATED COUNT: 11.7 % (ref 11–15)
EST. AVERAGE GLUCOSE BLD GHB EST-MCNC: 140 MG/DL (ref 68–126)
GLOBULIN PLAS-MCNC: 3.7 G/DL (ref 2.8–4.4)
GLUCOSE BLD-MCNC: 123 MG/DL (ref 70–99)
HBA1C MFR BLD HPLC: 6.5 % (ref ?–5.7)
HCT VFR BLD AUTO: 42.1 % (ref 39–53)
HDLC SERPL-MCNC: 46 MG/DL (ref 40–59)
HGB BLD-MCNC: 14.3 G/DL (ref 13–17.5)
IMM GRANULOCYTES # BLD AUTO: 0.02 X10(3) UL (ref 0–1)
IMM GRANULOCYTES NFR BLD: 0.4 %
LDLC SERPL CALC-MCNC: 95 MG/DL (ref ?–100)
LYMPHOCYTES # BLD AUTO: 1.69 X10(3) UL (ref 1–4)
LYMPHOCYTES NFR BLD AUTO: 37.1 %
M PROTEIN MFR SERPL ELPH: 7.6 G/DL (ref 6.4–8.2)
MCH RBC QN AUTO: 30.5 PG (ref 26–34)
MCHC RBC AUTO-ENTMCNC: 34 G/DL (ref 31–37)
MCV RBC AUTO: 89.8 FL (ref 80–100)
MICROALBUMIN UR-MCNC: 31.1 MG/DL
MICROALBUMIN/CREAT 24H UR-RTO: 257 UG/MG (ref ?–30)
MONOCYTES # BLD AUTO: 0.42 X10(3) UL (ref 0.1–1)
MONOCYTES NFR BLD AUTO: 9.2 %
NEUTROPHILS # BLD AUTO: 2.14 X10 (3) UL (ref 1.5–7.7)
NEUTROPHILS # BLD AUTO: 2.14 X10(3) UL (ref 1.5–7.7)
NEUTROPHILS NFR BLD AUTO: 46.9 %
NONHDLC SERPL-MCNC: 118 MG/DL (ref ?–130)
OSMOLALITY SERPL CALC.SUM OF ELEC: 291 MOSM/KG (ref 275–295)
PLATELET # BLD AUTO: 225 10(3)UL (ref 150–450)
POTASSIUM SERPL-SCNC: 4.3 MMOL/L (ref 3.5–5.1)
RBC # BLD AUTO: 4.69 X10(6)UL (ref 3.8–5.8)
SODIUM SERPL-SCNC: 139 MMOL/L (ref 136–145)
TRIGL SERPL-MCNC: 113 MG/DL (ref 30–149)
TSI SER-ACNC: 1.09 MIU/ML (ref 0.36–3.74)
VLDLC SERPL CALC-MCNC: 23 MG/DL (ref 0–30)
WBC # BLD AUTO: 4.6 X10(3) UL (ref 4–11)

## 2019-03-25 PROCEDURE — 85025 COMPLETE CBC W/AUTO DIFF WBC: CPT

## 2019-03-25 PROCEDURE — 80053 COMPREHEN METABOLIC PANEL: CPT

## 2019-03-25 PROCEDURE — 82570 ASSAY OF URINE CREATININE: CPT

## 2019-03-25 PROCEDURE — 82043 UR ALBUMIN QUANTITATIVE: CPT

## 2019-03-25 PROCEDURE — 36415 COLL VENOUS BLD VENIPUNCTURE: CPT

## 2019-03-25 PROCEDURE — 84443 ASSAY THYROID STIM HORMONE: CPT

## 2019-03-25 PROCEDURE — 86039 ANTINUCLEAR ANTIBODIES (ANA): CPT

## 2019-03-25 PROCEDURE — 80061 LIPID PANEL: CPT

## 2019-03-25 PROCEDURE — 86038 ANTINUCLEAR ANTIBODIES: CPT

## 2019-03-25 PROCEDURE — 83036 HEMOGLOBIN GLYCOSYLATED A1C: CPT

## 2019-03-25 PROCEDURE — 86235 NUCLEAR ANTIGEN ANTIBODY: CPT

## 2019-03-26 LAB
ENA SS-A AB SER QL IA: NEGATIVE
ENA SS-B AB SER QL IA: NEGATIVE

## 2019-03-28 LAB — NUCLEAR IGG TITR SER IF: POSITIVE {TITER}

## 2019-03-29 LAB — ANA NUCLEOLAR TITR SER IF: 320 {TITER}

## 2019-04-04 ENCOUNTER — LAB ENCOUNTER (OUTPATIENT)
Dept: LAB | Age: 74
End: 2019-04-04
Attending: FAMILY MEDICINE
Payer: MEDICARE

## 2019-04-04 ENCOUNTER — OFFICE VISIT (OUTPATIENT)
Dept: RHEUMATOLOGY | Facility: CLINIC | Age: 74
End: 2019-04-04
Payer: MEDICARE

## 2019-04-04 VITALS
HEIGHT: 66 IN | SYSTOLIC BLOOD PRESSURE: 155 MMHG | DIASTOLIC BLOOD PRESSURE: 84 MMHG | HEART RATE: 67 BPM | WEIGHT: 147 LBS | BODY MASS INDEX: 23.63 KG/M2

## 2019-04-04 DIAGNOSIS — R76.8 POSITIVE ANA (ANTINUCLEAR ANTIBODY): Primary | ICD-10-CM

## 2019-04-04 DIAGNOSIS — R68.2 DRY MOUTH: ICD-10-CM

## 2019-04-04 DIAGNOSIS — R76.8 POSITIVE ANA (ANTINUCLEAR ANTIBODY): ICD-10-CM

## 2019-04-04 PROCEDURE — 99204 OFFICE O/P NEW MOD 45 MIN: CPT | Performed by: INTERNAL MEDICINE

## 2019-04-04 PROCEDURE — G0463 HOSPITAL OUTPT CLINIC VISIT: HCPCS | Performed by: INTERNAL MEDICINE

## 2019-04-04 PROCEDURE — 86800 THYROGLOBULIN ANTIBODY: CPT

## 2019-04-04 PROCEDURE — 86140 C-REACTIVE PROTEIN: CPT

## 2019-04-04 PROCEDURE — 86235 NUCLEAR ANTIGEN ANTIBODY: CPT

## 2019-04-04 PROCEDURE — 86376 MICROSOMAL ANTIBODY EACH: CPT

## 2019-04-04 PROCEDURE — 86225 DNA ANTIBODY NATIVE: CPT

## 2019-04-04 PROCEDURE — 36415 COLL VENOUS BLD VENIPUNCTURE: CPT

## 2019-04-04 PROCEDURE — 86160 COMPLEMENT ANTIGEN: CPT

## 2019-04-04 PROCEDURE — 85652 RBC SED RATE AUTOMATED: CPT

## 2019-04-04 NOTE — PROGRESS NOTES
Leanne Dupree is a 68year old male who presents for Patient presents with:  Abnormal Labs: dry mouth and bottom lip burning feeling  . HPI:     I had the pleasure of seeing Leanne Dupree on 4/4/2019 for evaluation.      He is a pleasant 68 acetonide 0.1 % External Cream Apply topically 2 (two) times daily. Apply bid Disp: 454 g Rfl: 3   simvastatin 5 MG Oral Tab Take 1 tablet (5 mg total) by mouth nightly.  Disp: 90 tablet Rfl: 3   selenium sulfide 2.5 % External Lotion Apply to affected part disease  RS: No SOB, night dry Cough, No Pleurtic pain,   GI: No nausea, no vomiiting, no abominal pain, no hx of ulcer, no gastritis, no heartburn, no dyshpagia, no BRBPR or melena  Hemorroigds, he took aspirine - he had slight blood in stool, never had c 0.20 x10(3) uL 0.03   Immature Granulocyte Absolute      0.00 - 1.00 x10(3) uL 0.02   Neutrophils %      % 46.9   Lymphocytes %      % 37.1   Monocytes %      % 9.2   Eosinophils %      % 5.7   Basophils %      % 0.7   Immature Granulocyte %      % 0.4   G mouth, burning lips, new onset vitiligo - likely an autoimmune reaction   - possibly hctz could cause the CHICO to be positive as well - he' s already stopped this.   -check labs to eval for drug induce lupus , SLE  - d/w him to get cancer screenings as well

## 2019-04-11 ENCOUNTER — PATIENT OUTREACH (OUTPATIENT)
Dept: CASE MANAGEMENT | Age: 74
End: 2019-04-11

## 2019-04-11 NOTE — PROGRESS NOTES
Outreached to patient in regards to scheduling Medicare Annual exam (AHA). Patient scheduled his appt with his PCP for 05/06/2019. Thank you.

## 2019-04-25 ENCOUNTER — OFFICE VISIT (OUTPATIENT)
Dept: RHEUMATOLOGY | Facility: CLINIC | Age: 74
End: 2019-04-25
Payer: MEDICARE

## 2019-04-25 VITALS
DIASTOLIC BLOOD PRESSURE: 77 MMHG | WEIGHT: 150 LBS | SYSTOLIC BLOOD PRESSURE: 119 MMHG | HEART RATE: 69 BPM | HEIGHT: 66 IN | BODY MASS INDEX: 24.11 KG/M2

## 2019-04-25 DIAGNOSIS — R68.2 DRY MOUTH: ICD-10-CM

## 2019-04-25 DIAGNOSIS — R76.8 POSITIVE ANA (ANTINUCLEAR ANTIBODY): Primary | ICD-10-CM

## 2019-04-25 PROCEDURE — G0463 HOSPITAL OUTPT CLINIC VISIT: HCPCS | Performed by: INTERNAL MEDICINE

## 2019-04-25 PROCEDURE — 99214 OFFICE O/P EST MOD 30 MIN: CPT | Performed by: INTERNAL MEDICINE

## 2019-04-25 NOTE — PATIENT INSTRUCTIONS
1. If needed - ENT -  [place] -   [address]    [phone]    2. Return to clinic as needed.

## 2019-04-25 NOTE — PROGRESS NOTES
Yumiko Celis is a 68year old male who presents for Patient presents with: Follow - Up: results, lip burning bottom lip  . HPI:     I had the pleasure of seeing Yumiko Celis on 4/4/2019 for evaluation.      He is a pleasant 68 ye raold wh mouth nightly. Disp: 90 tablet Rfl: 3   amLODIPine Besylate 10 MG Oral Tab Take 1 tablet (10 mg total) by mouth daily.  Disp: 90 tablet Rfl: 3   METFORMIN HCL  MG Oral Tablet 24 Hr TAKE 1 TABLET EVERY NIGHT Disp: 90 tablet Rfl: 3   simvastatin 5 MG Or dr. Tavo Vega - it's a little abigail r- it's over his elbows and legs.  He's no longer using the triamcinalone cream,   HEENT: No dry eyes, no dry mouth, no Raynaud's, no nasal ulcers, no parotid swelling, no neck pain, no jaw pain, no temple pain  Eyes: No visu 225.0   Prelim Neutrophil Abs      1.50 - 7.70 x10 (3) uL 2.14   Neutrophils Absolute      1.50 - 7.70 x10(3) uL 2.14   Lymphocytes Absolute      1.00 - 4.00 x10(3) uL 1.69   Monocytes Absolute      0.10 - 1.00 x10(3) uL 0.42   Eosinophils Absolute      0. María Elena Penn M.D.    CHICO SCREEN      Negative Positive (A)   TSH      0.358 - 3.740 mIU/mL 1.090     Component      Latest Ref Rng & Units 4/4/2019   C-REACTIVE PROTEIN      <0.30 mg/dL <0.29   SED RATE      0 - 20 mm/Hr 15   Scleroderma (Scl-70) (MARQUITA) A

## 2019-05-06 ENCOUNTER — OFFICE VISIT (OUTPATIENT)
Dept: FAMILY MEDICINE CLINIC | Facility: CLINIC | Age: 74
End: 2019-05-06
Payer: MEDICARE

## 2019-05-06 VITALS
SYSTOLIC BLOOD PRESSURE: 126 MMHG | TEMPERATURE: 98 F | HEIGHT: 66 IN | DIASTOLIC BLOOD PRESSURE: 72 MMHG | WEIGHT: 149 LBS | BODY MASS INDEX: 23.95 KG/M2 | HEART RATE: 63 BPM

## 2019-05-06 DIAGNOSIS — E78.5 DYSLIPIDEMIA WITH ELEVATED LOW DENSITY LIPOPROTEIN (LDL) CHOLESTEROL AND ABNORMALLY LOW HIGH DENSITY LIPOPROTEIN CHOLESTEROL: ICD-10-CM

## 2019-05-06 DIAGNOSIS — B36.0 TINEA VERSICOLOR: ICD-10-CM

## 2019-05-06 DIAGNOSIS — Z12.11 SCREEN FOR COLON CANCER: ICD-10-CM

## 2019-05-06 DIAGNOSIS — N18.1 CHRONIC KIDNEY DISEASE (CKD), STAGE I: ICD-10-CM

## 2019-05-06 DIAGNOSIS — Z00.00 MEDICARE ANNUAL WELLNESS VISIT, SUBSEQUENT: Primary | ICD-10-CM

## 2019-05-06 DIAGNOSIS — I77.1 TORTUOUS AORTA (HCC): ICD-10-CM

## 2019-05-06 DIAGNOSIS — I10 ESSENTIAL HYPERTENSION: ICD-10-CM

## 2019-05-06 DIAGNOSIS — E11.49 TYPE 2 DIABETES MELLITUS WITH OTHER NEUROLOGIC COMPLICATION, WITHOUT LONG-TERM CURRENT USE OF INSULIN (HCC): ICD-10-CM

## 2019-05-06 DIAGNOSIS — R53.82 CHRONIC FATIGUE: ICD-10-CM

## 2019-05-06 DIAGNOSIS — N52.9 ERECTILE DYSFUNCTION, UNSPECIFIED ERECTILE DYSFUNCTION TYPE: ICD-10-CM

## 2019-05-06 DIAGNOSIS — Z12.5 SCREENING FOR PROSTATE CANCER: ICD-10-CM

## 2019-05-06 DIAGNOSIS — Z00.00 ENCOUNTER FOR ANNUAL HEALTH EXAMINATION: ICD-10-CM

## 2019-05-06 DIAGNOSIS — L80 VITILIGO: ICD-10-CM

## 2019-05-06 DIAGNOSIS — E11.21 DIABETIC NEPHROPATHY ASSOCIATED WITH TYPE 2 DIABETES MELLITUS (HCC): ICD-10-CM

## 2019-05-06 PROBLEM — B35.4 TINEA CORPORIS: Status: RESOLVED | Noted: 2018-07-12 | Resolved: 2019-05-06

## 2019-05-06 PROCEDURE — 99397 PER PM REEVAL EST PAT 65+ YR: CPT | Performed by: FAMILY MEDICINE

## 2019-05-06 PROCEDURE — 96160 PT-FOCUSED HLTH RISK ASSMT: CPT | Performed by: FAMILY MEDICINE

## 2019-05-06 PROCEDURE — G0439 PPPS, SUBSEQ VISIT: HCPCS | Performed by: FAMILY MEDICINE

## 2019-05-06 RX ORDER — SIMVASTATIN 5 MG
5 TABLET ORAL NIGHTLY
Qty: 90 TABLET | Refills: 3 | Status: SHIPPED | OUTPATIENT
Start: 2019-05-06 | End: 2020-06-03 | Stop reason: SINTOL

## 2019-05-06 NOTE — PROGRESS NOTES
HPI:   Ulises Schmidt is a 68year old male who presents for a Medicare Subsequent Annual Wellness visit (Pt already had Initial Annual Wellness).       His last annual assessment has been over 1 year: Annual Physical due on 05/02/2018         Fall/Ri abnormally low high density lipoprotein cholesterol     Chronic fatigue     Hypertension     Erectile dysfunction     Chronic kidney disease (CKD), stage I     Tortuous aorta (HCC)     Diabetic nephropathy (HCC)     Tinea versicolor    Wt Readings from [facility] surgical history that includes electrocardiogram, complete (08-). His family history includes Diabetes in his mother; Heart Disease in his father; Hypertension in his mother. SOCIAL HISTORY:   He  reports that he has quit smoking.  He quit after words in a sentence and need to ask people to repeat themselves:  No   I especially have trouble understanding the speech of women and children:  No I have trouble understanding the speaker in a large room such as at a meeting or place of Baptism:  No   Ma Vaccination History     Immunization History   Administered Date(s) Administered   • FLUAD High Dose 72 yr and older (90807) 11/21/2017   • Fluzone Vaccine Medicare () 11/19/2014   • Influenza 10/11/2011, 10/15/2013   • Pneumococcal (Dock Bounds understanding of these issues and agrees to the plan. Reinforced healthy diet, lifestyle, and exercise. Lab work ordered. Consult ordered. Return in about 4 months (around 9/6/2019). Rick Fuentes DO, 5/6/2019     General Health     In the Update Health Maintenance if applicable     Immunizations (Update Immunization Activity if applicable)     Influenza  Covered Annually 11/21/2017   Please get every year    Pneumococcal 13 (Prevnar)  Covered Once after 65 03/08/2016 Please get once after y (ug/mg)   Date Value   03/25/2019 257.0 (H)        LDL  Annually LDL Cholesterol (mg/dL)   Date Value   03/25/2019 95     LDL-CHOLESTEROL (mg/dL (calc))   Date Value   11/04/2013 117    No flowsheet data found.      Dilated Eye exam  Annually No flowsheet d

## 2019-05-06 NOTE — PATIENT INSTRUCTIONS
Ciro Marks's SCREENING SCHEDULE   Tests on this list are recommended by your physician but may not be covered, or covered at this frequency, by your insurer. Please check with your insurance carrier before scheduling to verify coverage.     PREV for this or any previous visit.  Limited to patients who meet one of the following criteria:   • Men who are 73-68 years old and have smoked more than 100 cigarettes in their lifetime   • Anyone with a family history    Colorectal Cancer Screening Covered u institutions for the mentally retarded   Persons who live in the same house as a HepB virus carrier   Homosexual men   Illicit injectable drug abusers     Tetanus Toxoid- Only covered with a cut with metal- TD and TDaP Not covered by Medicare Part B) No or

## 2019-05-07 ENCOUNTER — OFFICE VISIT (OUTPATIENT)
Dept: OTOLARYNGOLOGY | Facility: CLINIC | Age: 74
End: 2019-05-07
Payer: MEDICARE

## 2019-05-07 VITALS — DIASTOLIC BLOOD PRESSURE: 78 MMHG | TEMPERATURE: 98 F | SYSTOLIC BLOOD PRESSURE: 129 MMHG

## 2019-05-07 DIAGNOSIS — R68.2 DRY MOUTH: Primary | ICD-10-CM

## 2019-05-07 PROCEDURE — 99203 OFFICE O/P NEW LOW 30 MIN: CPT | Performed by: OTOLARYNGOLOGY

## 2019-05-07 PROCEDURE — G0463 HOSPITAL OUTPT CLINIC VISIT: HCPCS | Performed by: OTOLARYNGOLOGY

## 2019-05-08 NOTE — PROGRESS NOTES
June Aburto is a 68year old male. Patient presents with: Other: very dry mouth 2-3months ago  Cerumen Impaction: earwax    HPI:   He has been experiencing a little bit of irritation and burning of his lips.   He feels a burning at the tip of his Skin Normal Inspection - Normal.   Constitutional Normal Overall appearance - Normal.   Head/Face Normal Facial features - Normal. Eyebrows - Normal. Skull - Normal.   Oral/Oropharynx Normal Lips - Normal, Tonsils - Normal, Tongue - Normal    Nasal Polly Key

## 2019-07-08 RX ORDER — METFORMIN HYDROCHLORIDE 500 MG/1
TABLET, EXTENDED RELEASE ORAL
Qty: 90 TABLET | Refills: 1 | Status: SHIPPED | OUTPATIENT
Start: 2019-07-08 | End: 2020-01-19

## 2019-07-08 NOTE — TELEPHONE ENCOUNTER
Refill passed per [facility], [facility] protocol.   Diabetes Medications  Protocol Criteria:  · Appointment scheduled in the past 6 months or the next 3 months  · A1C < 7.5 in the past 6 months  · Creatinine in the past 12 months  · Creatinine result < 1.5   Rece

## 2019-10-10 ENCOUNTER — LAB ENCOUNTER (OUTPATIENT)
Dept: LAB | Age: 74
End: 2019-10-10
Attending: FAMILY MEDICINE
Payer: MEDICARE

## 2019-10-10 DIAGNOSIS — Z12.5 SCREENING FOR PROSTATE CANCER: ICD-10-CM

## 2019-10-10 DIAGNOSIS — E11.49 TYPE 2 DIABETES MELLITUS WITH OTHER NEUROLOGIC COMPLICATION, WITHOUT LONG-TERM CURRENT USE OF INSULIN (HCC): ICD-10-CM

## 2019-10-10 DIAGNOSIS — E78.5 DYSLIPIDEMIA WITH ELEVATED LOW DENSITY LIPOPROTEIN (LDL) CHOLESTEROL AND ABNORMALLY LOW HIGH DENSITY LIPOPROTEIN CHOLESTEROL: ICD-10-CM

## 2019-10-10 DIAGNOSIS — E11.21 DIABETIC NEPHROPATHY ASSOCIATED WITH TYPE 2 DIABETES MELLITUS (HCC): ICD-10-CM

## 2019-10-10 PROCEDURE — 83036 HEMOGLOBIN GLYCOSYLATED A1C: CPT

## 2019-10-10 PROCEDURE — 80053 COMPREHEN METABOLIC PANEL: CPT

## 2019-10-10 PROCEDURE — 82043 UR ALBUMIN QUANTITATIVE: CPT

## 2019-10-10 PROCEDURE — 85025 COMPLETE CBC W/AUTO DIFF WBC: CPT

## 2019-10-10 PROCEDURE — 36415 COLL VENOUS BLD VENIPUNCTURE: CPT

## 2019-10-10 PROCEDURE — 82570 ASSAY OF URINE CREATININE: CPT

## 2019-10-10 PROCEDURE — 80061 LIPID PANEL: CPT

## 2019-10-23 NOTE — TELEPHONE ENCOUNTER
S/w pt - he denies needed this from derm, states he's been getting it from PCP and will F/U with PCP.

## 2019-10-29 ENCOUNTER — OFFICE VISIT (OUTPATIENT)
Dept: FAMILY MEDICINE CLINIC | Facility: CLINIC | Age: 74
End: 2019-10-29
Payer: MEDICARE

## 2019-10-29 VITALS
WEIGHT: 144 LBS | HEIGHT: 66 IN | BODY MASS INDEX: 23.14 KG/M2 | HEART RATE: 64 BPM | DIASTOLIC BLOOD PRESSURE: 74 MMHG | SYSTOLIC BLOOD PRESSURE: 160 MMHG | RESPIRATION RATE: 16 BRPM

## 2019-10-29 DIAGNOSIS — B36.0 TINEA VERSICOLOR: ICD-10-CM

## 2019-10-29 DIAGNOSIS — E11.49 TYPE 2 DIABETES MELLITUS WITH OTHER NEUROLOGIC COMPLICATION, WITHOUT LONG-TERM CURRENT USE OF INSULIN (HCC): ICD-10-CM

## 2019-10-29 DIAGNOSIS — Z23 ENCOUNTER FOR IMMUNIZATION: ICD-10-CM

## 2019-10-29 DIAGNOSIS — R53.82 CHRONIC FATIGUE: Primary | ICD-10-CM

## 2019-10-29 DIAGNOSIS — I10 ESSENTIAL HYPERTENSION: ICD-10-CM

## 2019-10-29 DIAGNOSIS — L29.9 ITCHY SKIN: ICD-10-CM

## 2019-10-29 PROCEDURE — G0008 ADMIN INFLUENZA VIRUS VAC: HCPCS | Performed by: FAMILY MEDICINE

## 2019-10-29 PROCEDURE — 99214 OFFICE O/P EST MOD 30 MIN: CPT | Performed by: FAMILY MEDICINE

## 2019-10-29 PROCEDURE — 90662 IIV NO PRSV INCREASED AG IM: CPT | Performed by: FAMILY MEDICINE

## 2019-10-29 NOTE — PROGRESS NOTES
Diabetic Visit  Itchy skin  Sugars good  Labs reviewed. Patient denies problems with their medication. Denies ulcers, chest pain, dyspnea on exertion.     Ht rrr no M no S3 S4  Lung clear no wheeze  abd soft nontender  Carotids without bruit  Ext norm

## 2019-11-01 ENCOUNTER — TELEPHONE (OUTPATIENT)
Dept: FAMILY MEDICINE CLINIC | Facility: CLINIC | Age: 74
End: 2019-11-01

## 2019-11-01 DIAGNOSIS — E11.42 TYPE 2 DIABETES MELLITUS WITH DIABETIC POLYNEUROPATHY, WITHOUT LONG-TERM CURRENT USE OF INSULIN (HCC): Primary | ICD-10-CM

## 2019-11-01 NOTE — TELEPHONE ENCOUNTER
New Rx Request  1. Accu-Check Guide strips    2.  Accu-Check Guide .S. [facility]

## 2019-12-05 NOTE — TELEPHONE ENCOUNTER
Patient called and states that he never received:     New Rx Request  1. Accu-Check Guide strips     2. Accu-Check Guide Glouse Meter . Please advise.

## 2020-01-19 RX ORDER — METFORMIN HYDROCHLORIDE 500 MG/1
TABLET, EXTENDED RELEASE ORAL
Qty: 90 TABLET | Refills: 1 | OUTPATIENT
Start: 2020-01-19

## 2020-01-19 RX ORDER — METFORMIN HYDROCHLORIDE 500 MG/1
TABLET, EXTENDED RELEASE ORAL
Qty: 90 TABLET | Refills: 1 | Status: SHIPPED | OUTPATIENT
Start: 2020-01-19 | End: 2020-03-31

## 2020-01-19 NOTE — TELEPHONE ENCOUNTER
Refill passed per [facility], [facility] protocol.   Diabetes Medications  Protocol Criteria:  · Appointment scheduled in the past 6 months or the next 3 months  · A1C < 7.5 in the past 6 months  · Creatinine in the past 12 months  · Creatinine result < 1.5   Rece

## 2020-01-27 ENCOUNTER — TELEPHONE (OUTPATIENT)
Dept: CASE MANAGEMENT | Age: 75
End: 2020-01-27

## 2020-01-27 NOTE — TELEPHONE ENCOUNTER
Patient is eligible for a 2020 Medicare Advantage Supervisit. Left message to call back [phone].

## 2020-02-05 ENCOUNTER — OFFICE VISIT (OUTPATIENT)
Dept: FAMILY MEDICINE CLINIC | Facility: CLINIC | Age: 75
End: 2020-02-05
Payer: MEDICARE

## 2020-02-05 VITALS
HEART RATE: 64 BPM | DIASTOLIC BLOOD PRESSURE: 82 MMHG | HEIGHT: 66 IN | TEMPERATURE: 98 F | BODY MASS INDEX: 23.63 KG/M2 | SYSTOLIC BLOOD PRESSURE: 152 MMHG | WEIGHT: 147 LBS

## 2020-02-05 DIAGNOSIS — K13.21 LEUKOPLAKIA OF ORAL MUCOSA: Primary | ICD-10-CM

## 2020-02-05 PROCEDURE — 99213 OFFICE O/P EST LOW 20 MIN: CPT | Performed by: FAMILY MEDICINE

## 2020-02-05 RX ORDER — BLOOD-GLUCOSE METER
EACH MISCELLANEOUS
COMMUNITY
Start: 2019-12-10 | End: 2020-02-11

## 2020-02-05 RX ORDER — LANCING DEVICE/LANCETS
KIT MISCELLANEOUS
COMMUNITY
Start: 2019-12-10

## 2020-02-05 RX ORDER — LANCETS
EACH MISCELLANEOUS
COMMUNITY
Start: 2019-12-10

## 2020-02-05 RX ORDER — VALACYCLOVIR HYDROCHLORIDE 500 MG/1
500 TABLET, FILM COATED ORAL 2 TIMES DAILY
Qty: 14 TABLET | Refills: 0 | Status: SHIPPED | OUTPATIENT
Start: 2020-02-05 | End: 2020-07-30 | Stop reason: ALTCHOICE

## 2020-02-05 RX ORDER — BLOOD SUGAR DIAGNOSTIC
STRIP MISCELLANEOUS
COMMUNITY
Start: 2019-12-10 | End: 2020-11-09

## 2020-02-05 RX ORDER — BLOOD GLUCOSE CONTROL HIGH,LOW
EACH MISCELLANEOUS
COMMUNITY
Start: 2019-12-10

## 2020-02-05 NOTE — PROGRESS NOTES
Anastacio Christianson is a 76year old male. Patient presents with: Other: tight feeling around lips and tongue, same issue last summer    HPI:   Reports feels uncomfortable around lips and sides of mouth. Reports same issue last summer but returned again. Comment: Wine, occasionally    Drug use: No       REVIEW OF SYSTEMS:   GENERAL HEALTH: feels well otherwise  SKIN: denies any unusual skin lesions or rashes  HEENT: denies eye complaints,denies sore throat, denies ear pain  RESPIRATORY: denies shortness of

## 2020-02-11 RX ORDER — BLOOD-GLUCOSE METER
EACH MISCELLANEOUS
Qty: 1 KIT | Refills: 0 | Status: SHIPPED | OUTPATIENT
Start: 2020-02-11 | End: 2020-04-13

## 2020-02-12 NOTE — TELEPHONE ENCOUNTER
Refill passed per [facility] protocol.   Refill Protocol Appointment Criteria  · Appointment scheduled in the past 12 months or in the next 3 months  Recent Outpatient Visits            6 days ago Leukoplakia of oral mucosa    [facility], [place]

## 2020-03-02 DIAGNOSIS — I10 ESSENTIAL HYPERTENSION: ICD-10-CM

## 2020-03-02 DIAGNOSIS — I77.1 TORTUOUS AORTA (HCC): ICD-10-CM

## 2020-03-02 DIAGNOSIS — E11.9 TYPE 2 DIABETES MELLITUS WITHOUT COMPLICATION, WITHOUT LONG-TERM CURRENT USE OF INSULIN (HCC): ICD-10-CM

## 2020-03-03 RX ORDER — IRBESARTAN 300 MG/1
TABLET ORAL
Qty: 90 TABLET | Refills: 1 | Status: SHIPPED | OUTPATIENT
Start: 2020-03-03 | End: 2020-06-03

## 2020-03-03 RX ORDER — AMLODIPINE BESYLATE 10 MG/1
TABLET ORAL
Qty: 90 TABLET | Refills: 1 | Status: SHIPPED | OUTPATIENT
Start: 2020-03-03 | End: 2020-06-03

## 2020-03-04 NOTE — TELEPHONE ENCOUNTER
Refill passed per [facility] protocol.   Hypertensive Medications  Protocol Criteria:  · Appointment scheduled in the past 6 months or in the next 3 months  · BMP or CMP in the past 12 months  · Creatinine result < 2  Recent Outpatient Visits

## 2020-03-09 ENCOUNTER — TELEPHONE (OUTPATIENT)
Dept: FAMILY MEDICINE CLINIC | Facility: CLINIC | Age: 75
End: 2020-03-09

## 2020-03-09 DIAGNOSIS — K64.9 HEMORRHOIDS, UNSPECIFIED HEMORRHOID TYPE: Primary | ICD-10-CM

## 2020-03-09 NOTE — TELEPHONE ENCOUNTER
Per patient he needs a referral for a specialist for his Hemorrhoid patient states that doctor know about this problem of him but did not tell him where to go.

## 2020-03-10 NOTE — TELEPHONE ENCOUNTER
Please see message below. Pended referral. Please review diagnosis and sign off if you agree.      Thank you,  [address] Hannah CSA

## 2020-03-12 ENCOUNTER — LAB ENCOUNTER (OUTPATIENT)
Dept: LAB | Age: 75
End: 2020-03-12
Attending: FAMILY MEDICINE
Payer: MEDICARE

## 2020-03-12 DIAGNOSIS — E11.49 TYPE 2 DIABETES MELLITUS WITH OTHER NEUROLOGIC COMPLICATION, WITHOUT LONG-TERM CURRENT USE OF INSULIN (HCC): ICD-10-CM

## 2020-03-12 LAB
ALBUMIN SERPL-MCNC: 4.2 G/DL (ref 3.4–5)
ALBUMIN/GLOB SERPL: 1.1 {RATIO} (ref 1–2)
ALP LIVER SERPL-CCNC: 70 U/L (ref 45–117)
ALT SERPL-CCNC: 27 U/L (ref 16–61)
ANION GAP SERPL CALC-SCNC: 7 MMOL/L (ref 0–18)
AST SERPL-CCNC: 15 U/L (ref 15–37)
BILIRUB SERPL-MCNC: 0.7 MG/DL (ref 0.1–2)
BUN BLD-MCNC: 19 MG/DL (ref 7–18)
BUN/CREAT SERPL: 20 (ref 10–20)
CALCIUM BLD-MCNC: 9.3 MG/DL (ref 8.5–10.1)
CHLORIDE SERPL-SCNC: 104 MMOL/L (ref 98–112)
CHOLEST SMN-MCNC: 163 MG/DL (ref ?–200)
CO2 SERPL-SCNC: 29 MMOL/L (ref 21–32)
CREAT BLD-MCNC: 0.95 MG/DL (ref 0.7–1.3)
CREAT UR-SCNC: 126 MG/DL
EST. AVERAGE GLUCOSE BLD GHB EST-MCNC: 131 MG/DL (ref 68–126)
GLOBULIN PLAS-MCNC: 3.8 G/DL (ref 2.8–4.4)
GLUCOSE BLD-MCNC: 118 MG/DL (ref 70–99)
HBA1C MFR BLD HPLC: 6.2 % (ref ?–5.7)
HDLC SERPL-MCNC: 45 MG/DL (ref 40–59)
LDLC SERPL CALC-MCNC: 80 MG/DL (ref ?–100)
M PROTEIN MFR SERPL ELPH: 8 G/DL (ref 6.4–8.2)
MICROALBUMIN UR-MCNC: 53.3 MG/DL
MICROALBUMIN/CREAT 24H UR-RTO: 423 UG/MG (ref ?–30)
NONHDLC SERPL-MCNC: 118 MG/DL (ref ?–130)
OSMOLALITY SERPL CALC.SUM OF ELEC: 293 MOSM/KG (ref 275–295)
PATIENT FASTING Y/N/NP: YES
PATIENT FASTING Y/N/NP: YES
POTASSIUM SERPL-SCNC: 4.1 MMOL/L (ref 3.5–5.1)
SODIUM SERPL-SCNC: 140 MMOL/L (ref 136–145)
TRIGL SERPL-MCNC: 189 MG/DL (ref 30–149)
VLDLC SERPL CALC-MCNC: 38 MG/DL (ref 0–30)

## 2020-03-12 PROCEDURE — 82043 UR ALBUMIN QUANTITATIVE: CPT

## 2020-03-12 PROCEDURE — 80061 LIPID PANEL: CPT

## 2020-03-12 PROCEDURE — 83036 HEMOGLOBIN GLYCOSYLATED A1C: CPT

## 2020-03-12 PROCEDURE — 80053 COMPREHEN METABOLIC PANEL: CPT

## 2020-03-12 PROCEDURE — 36415 COLL VENOUS BLD VENIPUNCTURE: CPT

## 2020-03-12 PROCEDURE — 82570 ASSAY OF URINE CREATININE: CPT

## 2020-03-13 DIAGNOSIS — R80.9 PROTEINURIA, UNSPECIFIED TYPE: Primary | ICD-10-CM

## 2020-03-31 RX ORDER — METFORMIN HYDROCHLORIDE 500 MG/1
TABLET, EXTENDED RELEASE ORAL
Qty: 90 TABLET | Refills: 1 | Status: SHIPPED | OUTPATIENT
Start: 2020-03-31 | End: 2020-06-03

## 2020-04-13 RX ORDER — BLOOD-GLUCOSE METER
EACH MISCELLANEOUS
Qty: 1 KIT | Refills: 0 | Status: SHIPPED | OUTPATIENT
Start: 2020-04-13 | End: 2020-06-03 | Stop reason: ALTCHOICE

## 2020-05-27 ENCOUNTER — NURSE TRIAGE (OUTPATIENT)
Dept: FAMILY MEDICINE CLINIC | Facility: CLINIC | Age: 75
End: 2020-05-27

## 2020-05-27 NOTE — TELEPHONE ENCOUNTER
Please reply to pool: EM RN TRIAGE    Action Requested: Summary for Provider     []  Critical Lab, Recommendations Needed  [] Need Additional Advice  []   FYI    []   Need Orders  [] Need Medications Sent to Pharmacy  []  Other     SUMMARY:Offered office

## 2020-05-28 ENCOUNTER — OFFICE VISIT (OUTPATIENT)
Dept: FAMILY MEDICINE CLINIC | Facility: CLINIC | Age: 75
End: 2020-05-28
Payer: MEDICARE

## 2020-05-28 VITALS
WEIGHT: 145 LBS | SYSTOLIC BLOOD PRESSURE: 143 MMHG | HEART RATE: 71 BPM | HEIGHT: 66 IN | DIASTOLIC BLOOD PRESSURE: 79 MMHG | BODY MASS INDEX: 23.3 KG/M2

## 2020-05-28 DIAGNOSIS — R13.10 DYSPHAGIA, UNSPECIFIED TYPE: Primary | ICD-10-CM

## 2020-05-28 PROCEDURE — 99213 OFFICE O/P EST LOW 20 MIN: CPT | Performed by: FAMILY MEDICINE

## 2020-05-28 RX ORDER — FAMOTIDINE 40 MG/1
40 TABLET, FILM COATED ORAL DAILY
Qty: 30 TABLET | Refills: 1 | Status: SHIPPED | OUTPATIENT
Start: 2020-05-28 | End: 2020-06-19

## 2020-05-28 NOTE — PROGRESS NOTES
Justin Webster is a 76year old male. Patient presents with:  Throat Problem    HPI:   Reports problems with swallowing for 4 days. Feels like something stuck in throat. No pain. No heart - reports once a while but not recently.     ACCU-CHEK PRATIMA PL Yes      Alcohol/week: 0.0 standard drinks      Comment: Wine, occasionally    Drug use: No       REVIEW OF SYSTEMS:   GENERAL HEALTH: feels well otherwise  SKIN: denies any unusual skin lesions or rashes  HEENT: denies eye complaints,denies sore throat, d

## 2020-06-03 ENCOUNTER — OFFICE VISIT (OUTPATIENT)
Dept: FAMILY MEDICINE CLINIC | Facility: CLINIC | Age: 75
End: 2020-06-03
Payer: MEDICARE

## 2020-06-03 VITALS
DIASTOLIC BLOOD PRESSURE: 80 MMHG | WEIGHT: 144.81 LBS | HEIGHT: 66 IN | HEART RATE: 60 BPM | BODY MASS INDEX: 23.27 KG/M2 | SYSTOLIC BLOOD PRESSURE: 128 MMHG

## 2020-06-03 DIAGNOSIS — N52.9 ERECTILE DYSFUNCTION, UNSPECIFIED ERECTILE DYSFUNCTION TYPE: ICD-10-CM

## 2020-06-03 DIAGNOSIS — E11.49 TYPE 2 DIABETES MELLITUS WITH OTHER NEUROLOGIC COMPLICATION, WITHOUT LONG-TERM CURRENT USE OF INSULIN (HCC): ICD-10-CM

## 2020-06-03 DIAGNOSIS — I10 ESSENTIAL HYPERTENSION: ICD-10-CM

## 2020-06-03 DIAGNOSIS — E78.5 DYSLIPIDEMIA WITH ELEVATED LOW DENSITY LIPOPROTEIN (LDL) CHOLESTEROL AND ABNORMALLY LOW HIGH DENSITY LIPOPROTEIN CHOLESTEROL: ICD-10-CM

## 2020-06-03 DIAGNOSIS — I77.1 TORTUOUS AORTA (HCC): Primary | ICD-10-CM

## 2020-06-03 DIAGNOSIS — Z00.00 ENCOUNTER FOR ANNUAL HEALTH EXAMINATION: ICD-10-CM

## 2020-06-03 DIAGNOSIS — N18.1 CHRONIC KIDNEY DISEASE (CKD), STAGE I: ICD-10-CM

## 2020-06-03 DIAGNOSIS — E11.21 DIABETIC NEPHROPATHY ASSOCIATED WITH TYPE 2 DIABETES MELLITUS (HCC): ICD-10-CM

## 2020-06-03 PROBLEM — B36.0 TINEA VERSICOLOR: Status: RESOLVED | Noted: 2018-07-12 | Resolved: 2020-06-03

## 2020-06-03 PROCEDURE — G0439 PPPS, SUBSEQ VISIT: HCPCS | Performed by: FAMILY MEDICINE

## 2020-06-03 PROCEDURE — 96160 PT-FOCUSED HLTH RISK ASSMT: CPT | Performed by: FAMILY MEDICINE

## 2020-06-03 PROCEDURE — 99397 PER PM REEVAL EST PAT 65+ YR: CPT | Performed by: FAMILY MEDICINE

## 2020-06-03 RX ORDER — METFORMIN HYDROCHLORIDE 500 MG/1
500 TABLET, EXTENDED RELEASE ORAL NIGHTLY
Qty: 90 TABLET | Refills: 1 | Status: SHIPPED | OUTPATIENT
Start: 2020-06-03 | End: 2020-12-14

## 2020-06-03 RX ORDER — AMLODIPINE BESYLATE 10 MG/1
10 TABLET ORAL DAILY
Qty: 90 TABLET | Refills: 1 | Status: SHIPPED | OUTPATIENT
Start: 2020-06-03 | End: 2020-09-09

## 2020-06-03 RX ORDER — IRBESARTAN 300 MG/1
300 TABLET ORAL NIGHTLY
Qty: 90 TABLET | Refills: 1 | Status: SHIPPED | OUTPATIENT
Start: 2020-06-03 | End: 2020-09-09

## 2020-06-03 NOTE — PATIENT INSTRUCTIONS
Ilana Marks's SCREENING SCHEDULE   Tests on this list are recommended by your physician but may not be covered, or covered at this frequency, by your insurer. Please check with your insurance carrier before scheduling to verify coverage.     PREV found for this or any previous visit.  Limited to patients who meet one of the following criteria:   • Men who are 73-68 years old and have smoked more than 100 cigarettes in their lifetime   • Anyone with a family history    Colorectal Cancer Screening Cov Factor VIII or IX concentrates   Clients of institutions for the mentally retarded   Persons who live in the same house as a HepB virus carrier   Homosexual men   Illicit injectable drug abusers     Tetanus Toxoid- Only covered with a cut with metal- TD an

## 2020-06-03 NOTE — PROGRESS NOTES
HPI:   Yumiko Celis is a 76year old male who presents for a Medicare Subsequent Annual Wellness visit (Pt already had Initial Annual Wellness). Reports problems swallowing is better with famotidine. Has appt next week with Dr. Jessica Barba.    Report score of 0 so is at low risk.      Patient Care Team: Patient Care Team:  Saniya Dewitt MD as PCP - General (Family Medicine)  Chela Ceravntes DO as PCP - Family Practitioner (Family Practice)  Marlon Roman DO (INFECTIOUS DISEASES)    Patient Ac 1 tablet (5 mg total) by mouth nightly. Sildenafil Citrate 100 MG Oral Tab, Take 1 tablet (100 mg total) by mouth daily as needed for Erectile Dysfunction.        MEDICAL INFORMATION:   He  has a past medical history of Diabetes ([facility] [address] Osteoporosis (2010), strain to understand conversations:  No   I have to worry about missing the telephone ring or doorbell:  No I have trouble hearing conversations in a noisy background such as a crowded room or restaurant:  No   I get confused about where sounds come from: atraumatic, no cyanosis or edema   Pulses: 2+ and symmetric   Skin: Skin color, texture, turgor normal, no rashes or lesions   Lymph nodes: Cervical, supraclavicular, and axillary nodes normal   Neurologic: Normal   FOOT: normal pedal pulses.  Normal sensat disease (CKD), stage I  stable    6. Erectile dysfunction, unspecified erectile dysfunction type  Prn medications.      7. Dyslipidemia with elevated low density lipoprotein (LDL) cholesterol and abnormally low high density lipoprotein cholesterol  Stable o Maintenance if applicable    Flex Sigmoidoscopy Screen every 10 years No results found for this or any previous visit. No flowsheet data found. Fecal Occult Blood Annually No results found for: FOB No flowsheet data found.     Glaucoma Screening      Op Value   11/04/2013 1.05     Creatinine (mg/dL)   Date Value   03/12/2020 0.95    No flowsheet data found. Drug Serum Conc  Annually No results found for: DIGOXIN, DIG, VALP No flowsheet data found.        Diabetes      HgbA1C  Annually HEMOGLOBIN A1c (%

## 2020-06-09 ENCOUNTER — OFFICE VISIT (OUTPATIENT)
Dept: OTOLARYNGOLOGY | Facility: CLINIC | Age: 75
End: 2020-06-09
Payer: MEDICARE

## 2020-06-09 VITALS
DIASTOLIC BLOOD PRESSURE: 83 MMHG | SYSTOLIC BLOOD PRESSURE: 150 MMHG | BODY MASS INDEX: 23.92 KG/M2 | HEIGHT: 66 IN | WEIGHT: 148.81 LBS | TEMPERATURE: 98 F

## 2020-06-09 DIAGNOSIS — R13.10 DYSPHAGIA, UNSPECIFIED TYPE: Primary | ICD-10-CM

## 2020-06-09 PROCEDURE — 99213 OFFICE O/P EST LOW 20 MIN: CPT | Performed by: OTOLARYNGOLOGY

## 2020-06-09 PROCEDURE — 31575 DIAGNOSTIC LARYNGOSCOPY: CPT | Performed by: OTOLARYNGOLOGY

## 2020-06-09 NOTE — PROGRESS NOTES
Palmira De La Vega is a 76year old male. Patient presents with:  Throat Problem: c/o difficulty swallowing for 2 weeks    HPI:   2 weeks ago he had a sudden onset of difficulty swallowing while he was eating.   Food would not go down and he tried to [employer] rashes  RESPIRATORY: denies shortness of breath with exertion  NEURO: denies headaches    EXAM:   /83   Temp 98 °F (36.7 °C) (Tympanic)   Ht 5' 6\" (1.676 m)   Wt 148 lb 13 oz (67.5 kg)   BMI 24.02 kg/m²   System Findings Details   Skin Normal Inspec Hypopharynx/Larynx:  Epiglottis is normal.  Arytenoids:  Bilateral: Arytenoids are normal.  Vocal folds-false  Bilateral: Vocal folds (false) are normal.  Vocal folds-true  Bilateral: Vocal folds (true) are normal.  Pyriform sinus:  Bilateral: Pyrifo

## 2020-06-17 ENCOUNTER — HOSPITAL ENCOUNTER (OUTPATIENT)
Dept: GENERAL RADIOLOGY | Facility: HOSPITAL | Age: 75
Discharge: HOME | End: 2020-06-17
Attending: OTOLARYNGOLOGY
Payer: MEDICARE

## 2020-06-17 DIAGNOSIS — R13.10 DYSPHAGIA, UNSPECIFIED TYPE: ICD-10-CM

## 2020-06-17 PROCEDURE — 74220 X-RAY XM ESOPHAGUS 1CNTRST: CPT | Performed by: OTOLARYNGOLOGY

## 2020-06-18 ENCOUNTER — OFFICE VISIT (OUTPATIENT)
Dept: AUDIOLOGY | Facility: CLINIC | Age: 75
End: 2020-06-18
Payer: MEDICARE

## 2020-06-18 ENCOUNTER — OFFICE VISIT (OUTPATIENT)
Dept: OTOLARYNGOLOGY | Facility: CLINIC | Age: 75
End: 2020-06-18
Payer: MEDICARE

## 2020-06-18 VITALS
WEIGHT: 148.81 LBS | HEIGHT: 66 IN | SYSTOLIC BLOOD PRESSURE: 144 MMHG | TEMPERATURE: 97 F | DIASTOLIC BLOOD PRESSURE: 77 MMHG | BODY MASS INDEX: 23.92 KG/M2

## 2020-06-18 DIAGNOSIS — H93.13 RINGING IN EAR, BILATERAL: Primary | ICD-10-CM

## 2020-06-18 DIAGNOSIS — R13.10 DYSPHAGIA, UNSPECIFIED TYPE: ICD-10-CM

## 2020-06-18 DIAGNOSIS — H93.13 TINNITUS OF BOTH EARS: Primary | ICD-10-CM

## 2020-06-18 PROCEDURE — 99213 OFFICE O/P EST LOW 20 MIN: CPT | Performed by: OTOLARYNGOLOGY

## 2020-06-18 PROCEDURE — 92567 TYMPANOMETRY: CPT | Performed by: AUDIOLOGIST

## 2020-06-18 PROCEDURE — 92557 COMPREHENSIVE HEARING TEST: CPT | Performed by: AUDIOLOGIST

## 2020-06-18 NOTE — PROGRESS NOTES
AUDIOLOGY REPORT      Agustín Mckeon is a 76year old male     Referring Provider: Dr. India Aleman  YOB: 1945  Medical Record: [id number]      Patient Hearing History:  Patient referred by Dr. Shiraz Lock for bilateral tinnitus.       Perez Viramontes

## 2020-06-18 NOTE — PROGRESS NOTES
Brina Salvador is a 76year old male. Patient presents with:  Ringing In Ear: ringing in both ears for a week    HPI:   For the last 2 weeks he has been experiencing a ringing in his ears.   He feels that it happens mostly in the morning but can occur of breath with exertion  NEURO: denies headaches    EXAM:   /77   Temp 97.2 °F (36.2 °C) (Tympanic)   Ht 5' 6\" (1.676 m)   Wt 148 lb 13 oz (67.5 kg)   BMI 24.02 kg/m²   System Findings Details   Skin Normal Inspection - Normal.   Constitutional Norm

## 2020-06-19 RX ORDER — FAMOTIDINE 40 MG/1
TABLET, FILM COATED ORAL
Qty: 30 TABLET | Refills: 1 | Status: SHIPPED | OUTPATIENT
Start: 2020-06-19 | End: 2020-07-27

## 2020-06-24 NOTE — H&P
[address] [facility] [address] Gastroenterology                                                                                                  Clinic History and Physical     Pa former smoker  + occasional etoh  - Denies illicit drug use   - Occupation: Retired  - Lives with spouse  - NSAIDs/ASA use: PRN      History, Medications, Allergies, ROS:      Past Medical History:   Diagnosis Date   • Constipation hardjosselyn   • Diabetes (HC Reported on 7/2/2020 ) 14 tablet 0       Allergies:    Statins                 PAIN    Comment:Leg pain  Aspirin                 OTHER (SEE COMMENTS)    Comment:side effects:nose bleeds  Codeine                 UNKNOWN    ROS:   CONSTITUTIONAL:  negative f I, DM nephropahty, HLD, osteoporosis, who presents for evaluation of swallowing issues.         1. Dysphagia/esophageal web: + recent hx solid food dysphagia w/o prior hx w/ small esophageal web noted on esophogram. Pt reports s/s have self improved over th prescribed      EGD consent: I have discussed the risks (including risk of delayed/missed diagnosis), benefits, and alternatives to upper endoscopy/enteroscopy with the patient [who demonstrated understanding], including but not limited to the risks of ble

## 2020-07-02 ENCOUNTER — TELEPHONE (OUTPATIENT)
Dept: GASTROENTEROLOGY | Facility: CLINIC | Age: 75
End: 2020-07-02

## 2020-07-02 ENCOUNTER — OFFICE VISIT (OUTPATIENT)
Dept: GASTROENTEROLOGY | Facility: CLINIC | Age: 75
End: 2020-07-02
Payer: MEDICARE

## 2020-07-02 VITALS
WEIGHT: 148 LBS | SYSTOLIC BLOOD PRESSURE: 155 MMHG | HEART RATE: 74 BPM | HEIGHT: 66 IN | DIASTOLIC BLOOD PRESSURE: 78 MMHG | BODY MASS INDEX: 23.78 KG/M2

## 2020-07-02 DIAGNOSIS — R13.10 DYSPHAGIA, UNSPECIFIED TYPE: Primary | ICD-10-CM

## 2020-07-02 DIAGNOSIS — Q39.4 ESOPHAGEAL WEB: ICD-10-CM

## 2020-07-02 DIAGNOSIS — Z12.11 SCREENING FOR COLON CANCER: ICD-10-CM

## 2020-07-02 PROCEDURE — 99213 OFFICE O/P EST LOW 20 MIN: CPT | Performed by: NURSE PRACTITIONER

## 2020-07-02 NOTE — TELEPHONE ENCOUNTER
Scheduled for:  [phone] with possible dilatation  Provider Name:  Dr. Eden Lackey  Date:  7/20/2020  Location:  [facility]  Sedation:  IV  Time:  11:30 am, arrival 10:30 am  Prep:  NPO after midnight  Meds/Allergies Reconciled?:  Ananya/APN reviewed.   Paula Wilkes

## 2020-07-02 NOTE — PATIENT INSTRUCTIONS
-Schedule EGD w/ possible biopsy/dilation w/Dr. Kymberly Barreto w/ MAC or Dr. Morro Mead w/[facility] or [facility]  Dx: dysphagia, esophageal web on imaging   -Eligible for NE: No r/t possible diltation   -Anti-platelets and anti-coagulants: None  -Diabetes meds: Hold

## 2020-07-08 ENCOUNTER — LAB ENCOUNTER (OUTPATIENT)
Dept: LAB | Age: 75
End: 2020-07-08
Attending: FAMILY MEDICINE
Payer: MEDICARE

## 2020-07-08 DIAGNOSIS — E11.49 TYPE 2 DIABETES MELLITUS WITH OTHER NEUROLOGIC COMPLICATION, WITHOUT LONG-TERM CURRENT USE OF INSULIN (HCC): ICD-10-CM

## 2020-07-08 LAB
ALBUMIN SERPL-MCNC: 4 G/DL (ref 3.4–5)
ALBUMIN/GLOB SERPL: 1 {RATIO} (ref 1–2)
ALP LIVER SERPL-CCNC: 61 U/L (ref 45–117)
ALT SERPL-CCNC: 27 U/L (ref 16–61)
ANION GAP SERPL CALC-SCNC: 4 MMOL/L (ref 0–18)
AST SERPL-CCNC: 17 U/L (ref 15–37)
BASOPHILS # BLD AUTO: 0.03 X10(3) UL (ref 0–0.2)
BASOPHILS NFR BLD AUTO: 0.6 %
BILIRUB SERPL-MCNC: 1 MG/DL (ref 0.1–2)
BUN BLD-MCNC: 15 MG/DL (ref 7–18)
BUN/CREAT SERPL: 14.7 (ref 10–20)
CALCIUM BLD-MCNC: 9 MG/DL (ref 8.5–10.1)
CHLORIDE SERPL-SCNC: 104 MMOL/L (ref 98–112)
CHOLEST SMN-MCNC: 128 MG/DL (ref ?–200)
CO2 SERPL-SCNC: 29 MMOL/L (ref 21–32)
CREAT BLD-MCNC: 1.02 MG/DL (ref 0.7–1.3)
CREAT UR-SCNC: 73.3 MG/DL
DEPRECATED RDW RBC AUTO: 38.5 FL (ref 35.1–46.3)
EOSINOPHIL # BLD AUTO: 0.36 X10(3) UL (ref 0–0.7)
EOSINOPHIL NFR BLD AUTO: 7.2 %
ERYTHROCYTE [DISTWIDTH] IN BLOOD BY AUTOMATED COUNT: 11.8 % (ref 11–15)
EST. AVERAGE GLUCOSE BLD GHB EST-MCNC: 126 MG/DL (ref 68–126)
GLOBULIN PLAS-MCNC: 4 G/DL (ref 2.8–4.4)
GLUCOSE BLD-MCNC: 112 MG/DL (ref 70–99)
HBA1C MFR BLD HPLC: 6 % (ref ?–5.7)
HCT VFR BLD AUTO: 41.7 % (ref 39–53)
HDLC SERPL-MCNC: 41 MG/DL (ref 40–59)
HGB BLD-MCNC: 14.4 G/DL (ref 13–17.5)
IMM GRANULOCYTES # BLD AUTO: 0.01 X10(3) UL (ref 0–1)
IMM GRANULOCYTES NFR BLD: 0.2 %
LDLC SERPL CALC-MCNC: 62 MG/DL (ref ?–100)
LYMPHOCYTES # BLD AUTO: 2.1 X10(3) UL (ref 1–4)
LYMPHOCYTES NFR BLD AUTO: 42.2 %
M PROTEIN MFR SERPL ELPH: 8 G/DL (ref 6.4–8.2)
MCH RBC QN AUTO: 31.2 PG (ref 26–34)
MCHC RBC AUTO-ENTMCNC: 34.5 G/DL (ref 31–37)
MCV RBC AUTO: 90.3 FL (ref 80–100)
MICROALBUMIN UR-MCNC: 16.2 MG/DL
MICROALBUMIN/CREAT 24H UR-RTO: 221 UG/MG (ref ?–30)
MONOCYTES # BLD AUTO: 0.39 X10(3) UL (ref 0.1–1)
MONOCYTES NFR BLD AUTO: 7.8 %
NEUTROPHILS # BLD AUTO: 2.09 X10 (3) UL (ref 1.5–7.7)
NEUTROPHILS # BLD AUTO: 2.09 X10(3) UL (ref 1.5–7.7)
NEUTROPHILS NFR BLD AUTO: 42 %
NONHDLC SERPL-MCNC: 87 MG/DL (ref ?–130)
OSMOLALITY SERPL CALC.SUM OF ELEC: 286 MOSM/KG (ref 275–295)
PATIENT FASTING Y/N/NP: YES
PATIENT FASTING Y/N/NP: YES
PLATELET # BLD AUTO: 206 10(3)UL (ref 150–450)
POTASSIUM SERPL-SCNC: 4.3 MMOL/L (ref 3.5–5.1)
RBC # BLD AUTO: 4.62 X10(6)UL (ref 3.8–5.8)
SODIUM SERPL-SCNC: 137 MMOL/L (ref 136–145)
TRIGL SERPL-MCNC: 125 MG/DL (ref 30–149)
TSI SER-ACNC: 1.64 MIU/ML (ref 0.36–3.74)
VIT B12 SERPL-MCNC: 667 PG/ML (ref 193–986)
VLDLC SERPL CALC-MCNC: 25 MG/DL (ref 0–30)
WBC # BLD AUTO: 5 X10(3) UL (ref 4–11)

## 2020-07-08 PROCEDURE — 84443 ASSAY THYROID STIM HORMONE: CPT

## 2020-07-08 PROCEDURE — 36415 COLL VENOUS BLD VENIPUNCTURE: CPT

## 2020-07-08 PROCEDURE — 82607 VITAMIN B-12: CPT

## 2020-07-08 PROCEDURE — 82306 VITAMIN D 25 HYDROXY: CPT

## 2020-07-08 PROCEDURE — 82570 ASSAY OF URINE CREATININE: CPT

## 2020-07-08 PROCEDURE — 80053 COMPREHEN METABOLIC PANEL: CPT

## 2020-07-08 PROCEDURE — 85025 COMPLETE CBC W/AUTO DIFF WBC: CPT

## 2020-07-08 PROCEDURE — 80061 LIPID PANEL: CPT

## 2020-07-08 PROCEDURE — 82043 UR ALBUMIN QUANTITATIVE: CPT

## 2020-07-08 PROCEDURE — 83036 HEMOGLOBIN GLYCOSYLATED A1C: CPT

## 2020-07-10 LAB — 25(OH)D3 SERPL-MCNC: 22.7 NG/ML (ref 30–100)

## 2020-07-14 NOTE — PROGRESS NOTES
Arelis Hodges - Overall labs are stable. Cholesterol and diabetes are controlled. Continue current medications the same.  Vitamin D levels are low - pt to start daily vitamin d 5000 units over the counter. - Dr. Malvin Cervantes

## 2020-07-17 ENCOUNTER — LAB ENCOUNTER (OUTPATIENT)
Dept: LAB | Facility: HOSPITAL | Age: 75
End: 2020-07-17
Attending: INTERNAL MEDICINE
Payer: MEDICARE

## 2020-07-17 DIAGNOSIS — Z01.818 PRE-OP TESTING: ICD-10-CM

## 2020-07-18 LAB — SARS-COV-2 RNA RESP QL NAA+PROBE: NOT DETECTED

## 2020-07-20 ENCOUNTER — HOSPITAL ENCOUNTER (OUTPATIENT)
Facility: HOSPITAL | Age: 75
Setting detail: HOSPITAL OUTPATIENT SURGERY
Discharge: HOME | End: 2020-07-20
Attending: INTERNAL MEDICINE | Admitting: INTERNAL MEDICINE
Payer: MEDICARE

## 2020-07-20 DIAGNOSIS — R13.10 DYSPHAGIA, UNSPECIFIED TYPE: ICD-10-CM

## 2020-07-20 DIAGNOSIS — Z01.818 PRE-OP TESTING: Primary | ICD-10-CM

## 2020-07-20 DIAGNOSIS — Q39.4 ESOPHAGEAL WEB: ICD-10-CM

## 2020-07-20 LAB — GLUCOSE BLDC GLUCOMTR-MCNC: 126 MG/DL (ref 70–99)

## 2020-07-20 PROCEDURE — 43239 EGD BIOPSY SINGLE/MULTIPLE: CPT | Performed by: INTERNAL MEDICINE

## 2020-07-20 PROCEDURE — 43248 EGD GUIDE WIRE INSERTION: CPT | Performed by: INTERNAL MEDICINE

## 2020-07-20 PROCEDURE — 0DB38ZX EXCISION OF LOWER ESOPHAGUS, VIA NATURAL OR ARTIFICIAL OPENING ENDOSCOPIC, DIAGNOSTIC: ICD-10-PCS | Performed by: INTERNAL MEDICINE

## 2020-07-20 PROCEDURE — 0D718ZZ DILATION OF UPPER ESOPHAGUS, VIA NATURAL OR ARTIFICIAL OPENING ENDOSCOPIC: ICD-10-PCS | Performed by: INTERNAL MEDICINE

## 2020-07-20 PROCEDURE — 0DB68ZX EXCISION OF STOMACH, VIA NATURAL OR ARTIFICIAL OPENING ENDOSCOPIC, DIAGNOSTIC: ICD-10-PCS | Performed by: INTERNAL MEDICINE

## 2020-07-20 RX ORDER — SODIUM CHLORIDE, SODIUM LACTATE, POTASSIUM CHLORIDE, CALCIUM CHLORIDE 600; 310; 30; 20 MG/100ML; MG/100ML; MG/100ML; MG/100ML
INJECTION, SOLUTION INTRAVENOUS CONTINUOUS
Status: DISCONTINUED | OUTPATIENT
Start: 2020-07-20 | End: 2020-07-20

## 2020-07-20 RX ORDER — MIDAZOLAM HYDROCHLORIDE 1 MG/ML
1 INJECTION INTRAMUSCULAR; INTRAVENOUS EVERY 5 MIN PRN
Status: DISCONTINUED | OUTPATIENT
Start: 2020-07-20 | End: 2020-07-20

## 2020-07-20 RX ORDER — MIDAZOLAM HYDROCHLORIDE 1 MG/ML
INJECTION INTRAMUSCULAR; INTRAVENOUS
Status: DISCONTINUED | OUTPATIENT
Start: 2020-07-20 | End: 2020-07-20

## 2020-07-20 RX ORDER — SODIUM CHLORIDE 0.9 % (FLUSH) 0.9 %
10 SYRINGE (ML) INJECTION AS NEEDED
Status: DISCONTINUED | OUTPATIENT
Start: 2020-07-20 | End: 2020-07-20

## 2020-07-20 NOTE — OPERATIVE REPORT
[facility] Endoscopy Report      Date of Procedure:  07/20/20        Preoperative Diagnosis:  1. Dysphagia  2. Esophageal web      Postoperative Diagnosis:  1. Cervical esophageal web  2.   Rule out Garcia's esophagus (C1 M1.4)  3.  2 cm with a 2 cm sliding hiatal hernia. The stomach distended appropriately with insufflated air.   The mucosa of the stomach including cardia, fundus, gastric body and antrum was normal with the exception of several 3-4 mm (probable) fundic gland polyps along

## 2020-07-20 NOTE — OR NURSING
While in recovery, patient's heart rate would decrease as low as 38, briefly, then recover to 50s, patient asymptomatic. In pre-op heart rate was 70s. Dr. Kathryn Cruz notified, 12-lead EKG ordered and completed at bedside.  MD advised to have patient follo

## 2020-07-21 VITALS
DIASTOLIC BLOOD PRESSURE: 79 MMHG | SYSTOLIC BLOOD PRESSURE: 123 MMHG | OXYGEN SATURATION: 94 % | WEIGHT: 145 LBS | RESPIRATION RATE: 20 BRPM | HEART RATE: 56 BPM | TEMPERATURE: 98 F | BODY MASS INDEX: 22.76 KG/M2 | HEIGHT: 67 IN

## 2020-07-21 NOTE — PROGRESS NOTES
Please schedule pt with Dr. Mala Rodriguez. I will place a referral   Staff please let pt know that I am referring him to cardiologist to review his EKG - may need pacemaker.

## 2020-07-21 NOTE — PROGRESS NOTES
Spoke with patient (verified name and ), advised Dr Marivel Hui note and verbalized understanding. Cardiology information provided. Cardiology staff=see below and assist for office visit. Please schedule pt with Dr. Dann Taylor.  I will place a referral   Sta

## 2020-07-25 ENCOUNTER — TELEPHONE (OUTPATIENT)
Dept: FAMILY MEDICINE CLINIC | Facility: CLINIC | Age: 75
End: 2020-07-25

## 2020-07-25 ENCOUNTER — VIRTUAL PHONE E/M (OUTPATIENT)
Dept: FAMILY MEDICINE CLINIC | Facility: CLINIC | Age: 75
End: 2020-07-25
Payer: MEDICARE

## 2020-07-25 DIAGNOSIS — L80 VITILIGO: ICD-10-CM

## 2020-07-25 DIAGNOSIS — K13.0 CRACKED LIPS: Primary | ICD-10-CM

## 2020-07-25 DIAGNOSIS — K13.0 DRY LIPS: ICD-10-CM

## 2020-07-25 PROCEDURE — 99441 PHONE E/M BY PHYS 5-10 MIN: CPT | Performed by: FAMILY MEDICINE

## 2020-07-25 RX ORDER — DIAPER,BRIEF,INFANT-TODD,DISP
1 EACH MISCELLANEOUS 2 TIMES DAILY
Qty: 15 G | Refills: 0 | Status: SHIPPED | OUTPATIENT
Start: 2020-07-25 | End: 2020-09-24 | Stop reason: ALTCHOICE

## 2020-07-25 NOTE — PROGRESS NOTES
TELEPHONE VISIT PROGRESS NOTE  Todays date: 7/25/2020 1:40 PM        Most recent Nurse Triage message / Cathren Wesley message from patient:      Paged that he is having dry cracked lips with irritation.     Due to the COVID-19 emergency implementation plan, this changing color and becoming darker. It is more irritated and painful. He is not on any medications. He states that there is no bleeding. He does not have any stuffy nose that would cause mouth breathing.   He has been using lip balm for 1 month as per D Patient was speaking in complete sentences, no increased work of breathing and very coherent and alert on the phone. Alert and oriented x 3  Patient was responding to questions appropriately. Patient did not sound short of breath. Speaking clearly.   Rai Machuca Smoking status: Former Smoker        Years: 5.00      Smokeless tobacco: Never Used    Alcohol use:  Yes      Alcohol/week: 0.0 standard drinks      Frequency: Monthly or less      Comment: once in a month    Drug use: No     Reviewed Current Medications:

## 2020-07-27 ENCOUNTER — TELEPHONE (OUTPATIENT)
Dept: GASTROENTEROLOGY | Facility: CLINIC | Age: 75
End: 2020-07-27

## 2020-07-27 RX ORDER — FAMOTIDINE 40 MG/1
TABLET, FILM COATED ORAL
Qty: 30 TABLET | Refills: 1 | Status: SHIPPED | OUTPATIENT
Start: 2020-07-27 | End: 2020-08-31

## 2020-07-27 NOTE — TELEPHONE ENCOUNTER
Entered into Epic:     Recall for _EGD___per _Stathopoulos____in __3 years____  Last done: 7-  Next due:7-   updated

## 2020-07-27 NOTE — TELEPHONE ENCOUNTER
----- Message from Isacc Graf MD sent at 7/25/2020 11:20 AM CDT -----  I spoke to the patient. He has had a sore throat and mild discomfort swallowing after his procedure that continues to improve. No fevers. His swallowing is much easier.   We

## 2020-07-30 ENCOUNTER — OFFICE VISIT (OUTPATIENT)
Dept: CARDIOLOGY CLINIC | Facility: CLINIC | Age: 75
End: 2020-07-30
Payer: MEDICARE

## 2020-07-30 VITALS
BODY MASS INDEX: 22.8 KG/M2 | HEIGHT: 67 IN | TEMPERATURE: 98 F | RESPIRATION RATE: 19 BRPM | HEART RATE: 74 BPM | DIASTOLIC BLOOD PRESSURE: 73 MMHG | SYSTOLIC BLOOD PRESSURE: 133 MMHG | WEIGHT: 145.25 LBS

## 2020-07-30 DIAGNOSIS — R00.1 SINUS BRADYCARDIA: ICD-10-CM

## 2020-07-30 DIAGNOSIS — I10 ESSENTIAL HYPERTENSION: ICD-10-CM

## 2020-07-30 DIAGNOSIS — I44.0 1ST DEGREE AV BLOCK: Primary | ICD-10-CM

## 2020-07-30 PROCEDURE — 3078F DIAST BP <80 MM HG: CPT | Performed by: INTERNAL MEDICINE

## 2020-07-30 PROCEDURE — 3075F SYST BP GE 130 - 139MM HG: CPT | Performed by: INTERNAL MEDICINE

## 2020-07-30 PROCEDURE — 99205 OFFICE O/P NEW HI 60 MIN: CPT | Performed by: INTERNAL MEDICINE

## 2020-07-30 PROCEDURE — 3008F BODY MASS INDEX DOCD: CPT | Performed by: INTERNAL MEDICINE

## 2020-07-30 NOTE — PATIENT INSTRUCTIONS
-24-hour Holter monitor, and follow-up results with Dr. Janna Patel  -If any new symptoms as detailed by Dr. Janna Patel occur let your doctors know

## 2020-07-30 NOTE — H&P
[facility]    Cardiac Electrophysiology Consultation  2020    Name:  Yumiko Celis  : 1945    Date of consultation:   2020    Referring physician: Sonya Herr MD    Reason for Consultation:  Abnormal ECG    History of Pr 1  Irbesartan 300 MG Oral Tab, Take 1 tablet (300 mg total) by mouth nightly., Disp: 90 tablet, Rfl: 1  amLODIPine Besylate 10 MG Oral Tab, Take 1 tablet (10 mg total) by mouth daily. , Disp: 90 tablet, Rfl: 1  hydrocortisone 1 % External Ointment, Apply 1 cyanosis, or tenderness with calf palpation. Musculoskel: No joint deformities. Skin: Normal texture and turgor. Neurologic: Alert and oriented x 3. No dysarthria, facial droop, or gross motor deficits.     Laboratory Data:    ECG July 20, 2020, persona PAC, all of which is relatively benign. His stress test from a few years ago was normal, with normal LV function. There are no symptoms, sinus, or other objective findings that would suggest new issues with ischemia or heart failure.   There is no ind

## 2020-08-05 ENCOUNTER — HOSPITAL ENCOUNTER (OUTPATIENT)
Dept: CV DIAGNOSTICS | Facility: HOSPITAL | Age: 75
Discharge: HOME | End: 2020-08-05
Attending: INTERNAL MEDICINE
Payer: MEDICARE

## 2020-08-05 DIAGNOSIS — I44.0 1ST DEGREE AV BLOCK: ICD-10-CM

## 2020-08-05 DIAGNOSIS — R00.1 SINUS BRADYCARDIA: ICD-10-CM

## 2020-08-05 PROCEDURE — 93225 XTRNL ECG REC<48 HRS REC: CPT | Performed by: INTERNAL MEDICINE

## 2020-08-05 PROCEDURE — 93227 XTRNL ECG REC<48 HR R&I: CPT | Performed by: INTERNAL MEDICINE

## 2020-08-07 ENCOUNTER — PATIENT MESSAGE (OUTPATIENT)
Dept: GASTROENTEROLOGY | Facility: CLINIC | Age: 75
End: 2020-08-07

## 2020-08-07 RX ORDER — OMEPRAZOLE 20 MG/1
20 CAPSULE, DELAYED RELEASE ORAL EVERY MORNING
Qty: 10 CAPSULE | Refills: 0 | Status: SHIPPED | OUTPATIENT
Start: 2020-08-07 | End: 2021-09-22

## 2020-08-07 NOTE — TELEPHONE ENCOUNTER
Prescription sent to the patient's local pharmacy. Please have him contact us if the difficulty swallowing continues.

## 2020-08-07 NOTE — TELEPHONE ENCOUNTER
From: Lulu Reach  To: Dennis South MD  Sent: 8/7/2020 9:59 AM CDT  Subject: Visit Follow-up Question    This morning I have little difficulty in swallowing

## 2020-08-07 NOTE — TELEPHONE ENCOUNTER
Dr. Johan Pa-    I spoke with Ck Joshi he had one of his pills stuck in his throat this morning. He said this is the first time since surgery this has happened. He was able to get it to pass after several glasses of water.  He said the water was even [facility]

## 2020-08-12 ENCOUNTER — TELEPHONE (OUTPATIENT)
Dept: CARDIOLOGY CLINIC | Facility: CLINIC | Age: 75
End: 2020-08-12

## 2020-08-12 NOTE — TELEPHONE ENCOUNTER
Event monitor: Dr. Camryn Thomas, please review and advise. Last office visit 7/30/20. Thank you.   JENNIFER Feliciano  Cardio Clinical Staff             Pls review with Dr. Camryn Thomas, holter shows SVT and mobitz 1 AV block      Holter Impressions  - Abnormal

## 2020-08-13 NOTE — TELEPHONE ENCOUNTER
The findings on the Holter monitor are consistent with what we know about his rhythms from ECG and rhythm strip in the hospital.  If he continues to have an excellent exercise program with no symptoms, he should continue present management and observation.

## 2020-08-14 NOTE — TELEPHONE ENCOUNTER
Spoke w/ patient (HIPAA verified) - advised of MD's notes and to continue present management and exercise. Call office for any symptoms or concerns. No further questions/concerns at this time.

## 2020-08-19 DIAGNOSIS — E78.5 DYSLIPIDEMIA WITH ELEVATED LOW DENSITY LIPOPROTEIN (LDL) CHOLESTEROL AND ABNORMALLY LOW HIGH DENSITY LIPOPROTEIN CHOLESTEROL: ICD-10-CM

## 2020-08-19 RX ORDER — SIMVASTATIN 5 MG
5 TABLET ORAL NIGHTLY
Qty: 90 TABLET | Refills: 3 | OUTPATIENT
Start: 2020-08-19

## 2020-08-25 ENCOUNTER — TELEPHONE (OUTPATIENT)
Dept: CASE MANAGEMENT | Age: 75
End: 2020-08-25

## 2020-08-25 NOTE — TELEPHONE ENCOUNTER
Patient is eligible for a 2020 Medicare Advantage Supervisit. Pls schedule. Patient is on Dr ARREOLA [facility] list. Does he see Dr Rimma Alaniz or Dr Dawson Senters? Left message to call back [phone].

## 2020-08-31 RX ORDER — FAMOTIDINE 40 MG/1
TABLET, FILM COATED ORAL
Qty: 30 TABLET | Refills: 1 | Status: SHIPPED | OUTPATIENT
Start: 2020-08-31 | End: 2020-09-15

## 2020-09-09 DIAGNOSIS — I77.1 TORTUOUS AORTA (HCC): ICD-10-CM

## 2020-09-09 DIAGNOSIS — I10 ESSENTIAL HYPERTENSION: ICD-10-CM

## 2020-09-09 RX ORDER — IRBESARTAN 300 MG/1
300 TABLET ORAL NIGHTLY
Qty: 90 TABLET | Refills: 1 | Status: SHIPPED | OUTPATIENT
Start: 2020-09-09 | End: 2021-04-24

## 2020-09-09 RX ORDER — AMLODIPINE BESYLATE 10 MG/1
10 TABLET ORAL DAILY
Qty: 90 TABLET | Refills: 1 | Status: SHIPPED | OUTPATIENT
Start: 2020-09-09 | End: 2021-07-22

## 2020-09-15 ENCOUNTER — TELEPHONE (OUTPATIENT)
Dept: FAMILY MEDICINE CLINIC | Facility: CLINIC | Age: 75
End: 2020-09-15

## 2020-09-15 RX ORDER — FAMOTIDINE 40 MG/1
40 TABLET, FILM COATED ORAL DAILY
Qty: 90 TABLET | Refills: 1 | Status: SHIPPED | OUTPATIENT
Start: 2020-09-15 | End: 2020-09-24 | Stop reason: ALTCHOICE

## 2020-09-15 NOTE — TELEPHONE ENCOUNTER
Current Outpatient Medications: •  FAMOTIDINE 40 MG Oral Tab, TAKE 1 TABLET BY MOUTH EVERY DAY, Disp: 30 tablet, Rfl: 1

## 2020-09-24 ENCOUNTER — OFFICE VISIT (OUTPATIENT)
Dept: FAMILY MEDICINE CLINIC | Facility: CLINIC | Age: 75
End: 2020-09-24
Payer: MEDICARE

## 2020-09-24 VITALS
HEART RATE: 72 BPM | SYSTOLIC BLOOD PRESSURE: 130 MMHG | HEIGHT: 67 IN | WEIGHT: 145 LBS | TEMPERATURE: 97 F | DIASTOLIC BLOOD PRESSURE: 78 MMHG | BODY MASS INDEX: 22.76 KG/M2

## 2020-09-24 DIAGNOSIS — I10 ESSENTIAL HYPERTENSION: ICD-10-CM

## 2020-09-24 DIAGNOSIS — N18.1 CHRONIC KIDNEY DISEASE (CKD), STAGE I: ICD-10-CM

## 2020-09-24 DIAGNOSIS — Z00.00 ENCOUNTER FOR ANNUAL HEALTH EXAMINATION: ICD-10-CM

## 2020-09-24 DIAGNOSIS — I77.1 TORTUOUS AORTA (HCC): Primary | ICD-10-CM

## 2020-09-24 DIAGNOSIS — R00.1 SINUS BRADYCARDIA: ICD-10-CM

## 2020-09-24 DIAGNOSIS — E11.21 DIABETIC NEPHROPATHY ASSOCIATED WITH TYPE 2 DIABETES MELLITUS (HCC): ICD-10-CM

## 2020-09-24 DIAGNOSIS — H93.13 TINNITUS OF BOTH EARS: ICD-10-CM

## 2020-09-24 DIAGNOSIS — I44.0 1ST DEGREE AV BLOCK: ICD-10-CM

## 2020-09-24 DIAGNOSIS — N52.9 ERECTILE DYSFUNCTION, UNSPECIFIED ERECTILE DYSFUNCTION TYPE: ICD-10-CM

## 2020-09-24 DIAGNOSIS — E78.5 DYSLIPIDEMIA WITH ELEVATED LOW DENSITY LIPOPROTEIN (LDL) CHOLESTEROL AND ABNORMALLY LOW HIGH DENSITY LIPOPROTEIN CHOLESTEROL: ICD-10-CM

## 2020-09-24 DIAGNOSIS — E11.49 TYPE 2 DIABETES MELLITUS WITH OTHER NEUROLOGIC COMPLICATION, WITHOUT LONG-TERM CURRENT USE OF INSULIN (HCC): ICD-10-CM

## 2020-09-24 DIAGNOSIS — E55.9 VITAMIN D DEFICIENCY: ICD-10-CM

## 2020-09-24 PROCEDURE — 3008F BODY MASS INDEX DOCD: CPT | Performed by: FAMILY MEDICINE

## 2020-09-24 PROCEDURE — 3075F SYST BP GE 130 - 139MM HG: CPT | Performed by: FAMILY MEDICINE

## 2020-09-24 PROCEDURE — 99214 OFFICE O/P EST MOD 30 MIN: CPT | Performed by: FAMILY MEDICINE

## 2020-09-24 PROCEDURE — 3078F DIAST BP <80 MM HG: CPT | Performed by: FAMILY MEDICINE

## 2020-09-24 PROCEDURE — 90662 IIV NO PRSV INCREASED AG IM: CPT | Performed by: FAMILY MEDICINE

## 2020-09-24 PROCEDURE — G0008 ADMIN INFLUENZA VIRUS VAC: HCPCS | Performed by: FAMILY MEDICINE

## 2020-09-24 NOTE — PROGRESS NOTES
HPI:   Anastacio Christianson is a 76year old male who presents for a Medicare Subsequent Annual Wellness visit (Pt already had Initial Annual Wellness). PT here for regular physical. Reports still occasional issues with the throat.  Still taking the me (INFECTIOUS DISEASES)    Patient Active Problem List:     Type II diabetes mellitus (HCC)     Dyslipidemia with elevated low density lipoprotein (LDL) cholesterol and abnormally low high density lipoprotein cholesterol     Essential hypertension     Bill GENERAL: feels well otherwise  SKIN: denies any unusual skin lesions  EYES: denies blurred vision or double vision  HEENT: denies nasal congestion, sinus pain or ST  LUNGS: denies shortness of breath with exertion  CARDIOVASCULAR: denies chest pain on ex what others are saying and make inappropriate responses: No I avoid social activities because I cannot hear well and fear I will reply improperly: No   Family members and friends have told me they think I may have hearing loss:  No                  Visual A Pneumococcal (Prevnar 13) 03/08/2016   • Pneumovax 23 04/19/2018   Pended Date(s) Pended   • FLU VAC High Dose 65 YRS & Older PRSV Free (96667) 09/24/2020        ASSESSMENT AND OTHER RELEVANT CHRONIC CONDITIONS:   Ian Olivares is a 76year old male positive mental well-being?: Puzzles    This section provided for quick review of chart, separate sheet to patient  [address] Internal Lab or Procedure External Lab or Procedure   Diabetes Screening      HbgA1C   Annua or IX concentrates   Clients of institutions for the mentally retarded   Persons who live in the same house as a HepB virus carrier   Homosexual men   Illicit injectable drug abusers     Tetanus Toxoid  Only covered with a cut with metal- TD and TDaP Not c

## 2020-09-24 NOTE — PATIENT INSTRUCTIONS
Sherlyn Marks's SCREENING SCHEDULE   Tests on this list are recommended by your physician but may not be covered, or covered at this frequency, by your insurer. Please check with your insurance carrier before scheduling to verify coverage.     PREV results found for this or any previous visit.  Limited to patients who meet one of the following criteria:   • Men who are 73-68 years old and have smoked more than 100 cigarettes in their lifetime   • Anyone with a family history    Colorectal Cancer Scree orders found for this or any previous visit.  Medium/high risk factors:   End-stage renal disease   Hemophiliacs who received Factor VIII or IX concentrates   Clients of institutions for the mentally retarded   Persons who live in the same house as a HepB v

## 2020-10-04 ENCOUNTER — PATIENT MESSAGE (OUTPATIENT)
Dept: GASTROENTEROLOGY | Facility: CLINIC | Age: 75
End: 2020-10-04

## 2020-10-05 ENCOUNTER — TELEPHONE (OUTPATIENT)
Dept: GASTROENTEROLOGY | Facility: CLINIC | Age: 75
End: 2020-10-05

## 2020-10-05 DIAGNOSIS — Q39.4 ESOPHAGEAL WEB: Primary | ICD-10-CM

## 2020-10-05 NOTE — TELEPHONE ENCOUNTER
Hi Dr. Eden Lackey,     Please see patient's message below. The patient had issues swallowing some of his pills back in August and had requested Omeprazole. The patient is now stating that symptoms have worsened in the last week.       The patient

## 2020-10-05 NOTE — TELEPHONE ENCOUNTER
Please add the patient onto the schedule this Thursday at 0930. He should have a CBC and iron studies performed as iron deficiency can be associated with proximal esophageal webs. Please have him schedule and obtain the blood work prior to this visit.

## 2020-10-05 NOTE — TELEPHONE ENCOUNTER
Patient contacted, given number to call for lab appt (does not want to mychart or use [employer])--given OP registration # to call, instructed to make sure they are only doing the 3 ordered by Dr Argueta Host [facility]

## 2020-10-05 NOTE — TELEPHONE ENCOUNTER
----- Message from Ivis Buenrostro sent at 10/4/2020  8:01 AM CDT -----  Regarding: Visit Follow-up Question  Contact: [phone]  FLOR HAVING SWALLOWING DIFFICULTY  AGAIN I NEED TO SEE U

## 2020-10-05 NOTE — TELEPHONE ENCOUNTER
From: Ian Olivares  To: Dakota Alegre MD  Sent: 10/4/2020 8:01 AM CDT  Subject: Visit Follow-up Question    FLOR HAVING SWALLOWING DIFFICULTY AGAIN I NEED TO SEE U

## 2020-10-06 ENCOUNTER — LAB ENCOUNTER (OUTPATIENT)
Dept: LAB | Age: 75
End: 2020-10-06
Attending: INTERNAL MEDICINE
Payer: MEDICARE

## 2020-10-06 DIAGNOSIS — Q39.4 ESOPHAGEAL WEB: ICD-10-CM

## 2020-10-06 PROCEDURE — 36415 COLL VENOUS BLD VENIPUNCTURE: CPT

## 2020-10-06 PROCEDURE — 85025 COMPLETE CBC W/AUTO DIFF WBC: CPT

## 2020-10-06 PROCEDURE — 83540 ASSAY OF IRON: CPT

## 2020-10-06 PROCEDURE — 84466 ASSAY OF TRANSFERRIN: CPT

## 2020-10-06 PROCEDURE — 82728 ASSAY OF FERRITIN: CPT

## 2020-10-08 ENCOUNTER — OFFICE VISIT (OUTPATIENT)
Dept: GASTROENTEROLOGY | Facility: CLINIC | Age: 75
End: 2020-10-08
Payer: MEDICARE

## 2020-10-08 ENCOUNTER — TELEPHONE (OUTPATIENT)
Dept: FAMILY MEDICINE CLINIC | Facility: CLINIC | Age: 75
End: 2020-10-08

## 2020-10-08 VITALS
HEART RATE: 61 BPM | DIASTOLIC BLOOD PRESSURE: 76 MMHG | WEIGHT: 144 LBS | HEIGHT: 67 IN | SYSTOLIC BLOOD PRESSURE: 136 MMHG | BODY MASS INDEX: 22.6 KG/M2

## 2020-10-08 DIAGNOSIS — R13.10 DYSPHAGIA, UNSPECIFIED TYPE: Primary | ICD-10-CM

## 2020-10-08 DIAGNOSIS — R13.14 PHARYNGOESOPHAGEAL DYSPHAGIA: Primary | ICD-10-CM

## 2020-10-08 PROCEDURE — 3008F BODY MASS INDEX DOCD: CPT | Performed by: INTERNAL MEDICINE

## 2020-10-08 PROCEDURE — 99213 OFFICE O/P EST LOW 20 MIN: CPT | Performed by: INTERNAL MEDICINE

## 2020-10-08 PROCEDURE — 3078F DIAST BP <80 MM HG: CPT | Performed by: INTERNAL MEDICINE

## 2020-10-08 PROCEDURE — 3075F SYST BP GE 130 - 139MM HG: CPT | Performed by: INTERNAL MEDICINE

## 2020-10-08 NOTE — PATIENT INSTRUCTIONS
1.  Chew food slowly and carefully. Take small bites. 2.  Schedule videofluoroscopic swallowing study.   He will need to be COVID tested first.  3.  We may need to repeat the upper endoscopy and dilatation pending results of the above test.

## 2020-10-08 NOTE — PROGRESS NOTES
HPI:    Patient ID: Es Christianson is a 76year old male. HPI  The patient returns in follow-up following endoscopy on July 20, 2020.     As per previous notes the patient developed dysphagia to medications and food for the first time in June of th Medication Sig Dispense Refill   • amLODIPine Besylate 10 MG Oral Tab Take 1 tablet (10 mg total) by mouth daily. 90 tablet 1   • Irbesartan 300 MG Oral Tab Take 1 tablet (300 mg total) by mouth nightly.  90 tablet 1   • omeprazole 20 MG Oral Capsule Nahomy Negative.                =====  CONCLUSION:   1. Small esophageal well of in the cervical esophagus. 2. Small hiatal hernia demonstrated during Valsalva. 3. No obvious reflux. Dictated by (CST):  Willis Herrera MD on 6/17/2020 at 11:35 AM

## 2020-10-19 ENCOUNTER — APPOINTMENT (OUTPATIENT)
Dept: LAB | Age: 75
End: 2020-10-19
Attending: INTERNAL MEDICINE
Payer: MEDICARE

## 2020-10-19 DIAGNOSIS — R13.14 PHARYNGOESOPHAGEAL DYSPHAGIA: ICD-10-CM

## 2020-10-22 ENCOUNTER — HOSPITAL ENCOUNTER (OUTPATIENT)
Dept: GENERAL RADIOLOGY | Facility: HOSPITAL | Age: 75
Discharge: HOME | End: 2020-10-22
Attending: INTERNAL MEDICINE
Payer: MEDICARE

## 2020-10-22 DIAGNOSIS — R13.14 PHARYNGOESOPHAGEAL DYSPHAGIA: ICD-10-CM

## 2020-10-22 PROCEDURE — 92611 MOTION FLUOROSCOPY/SWALLOW: CPT

## 2020-10-22 PROCEDURE — 74230 X-RAY XM SWLNG FUNCJ C+: CPT | Performed by: INTERNAL MEDICINE

## 2020-10-22 NOTE — PROGRESS NOTES
ADULT VIDEOFLUOROSCOPIC SWALLOWING STUDY    Admission Date: 10/22/2020  Evaluation Date: 10/22/20  Radiologist: Dr. Corina Barrera: Regular(small bites at a time; chopped meats/breads/veggies)  Diet Recommendations Consistency: Regular; Thin liquids  Prior Level of Function: Independent  Prior Living Situation: Home with spouse  History of Recent: No recent respiratory difficulty         Reason for Referral: R/O aspiration    Family/Patient Goals: cause for dysphagia

## 2020-11-09 RX ORDER — BLOOD SUGAR DIAGNOSTIC
STRIP MISCELLANEOUS
Qty: 200 STRIP | Refills: 0 | Status: SHIPPED | OUTPATIENT
Start: 2020-11-09

## 2020-11-14 ENCOUNTER — HOSPITAL ENCOUNTER (EMERGENCY)
Facility: HOSPITAL | Age: 75
Discharge: HOME | End: 2020-11-14
Attending: EMERGENCY MEDICINE
Payer: MEDICARE

## 2020-11-14 ENCOUNTER — APPOINTMENT (OUTPATIENT)
Dept: GENERAL RADIOLOGY | Facility: HOSPITAL | Age: 75
End: 2020-11-14
Payer: MEDICARE

## 2020-11-14 VITALS
HEART RATE: 60 BPM | HEIGHT: 67 IN | OXYGEN SATURATION: 98 % | WEIGHT: 140 LBS | RESPIRATION RATE: 19 BRPM | SYSTOLIC BLOOD PRESSURE: 124 MMHG | TEMPERATURE: 98 F | BODY MASS INDEX: 21.97 KG/M2 | DIASTOLIC BLOOD PRESSURE: 70 MMHG

## 2020-11-14 DIAGNOSIS — R07.89 CHEST PAIN, ATYPICAL: Primary | ICD-10-CM

## 2020-11-14 PROCEDURE — 36415 COLL VENOUS BLD VENIPUNCTURE: CPT

## 2020-11-14 PROCEDURE — 84484 ASSAY OF TROPONIN QUANT: CPT

## 2020-11-14 PROCEDURE — 71045 X-RAY EXAM CHEST 1 VIEW: CPT | Performed by: EMERGENCY MEDICINE

## 2020-11-14 PROCEDURE — 84484 ASSAY OF TROPONIN QUANT: CPT | Performed by: EMERGENCY MEDICINE

## 2020-11-14 PROCEDURE — 93005 ELECTROCARDIOGRAM TRACING: CPT

## 2020-11-14 PROCEDURE — 85025 COMPLETE CBC W/AUTO DIFF WBC: CPT

## 2020-11-14 PROCEDURE — 93010 ELECTROCARDIOGRAM REPORT: CPT | Performed by: EMERGENCY MEDICINE

## 2020-11-14 PROCEDURE — 85025 COMPLETE CBC W/AUTO DIFF WBC: CPT | Performed by: EMERGENCY MEDICINE

## 2020-11-14 PROCEDURE — 80048 BASIC METABOLIC PNL TOTAL CA: CPT | Performed by: EMERGENCY MEDICINE

## 2020-11-14 PROCEDURE — 80048 BASIC METABOLIC PNL TOTAL CA: CPT

## 2020-11-14 PROCEDURE — 99284 EMERGENCY DEPT VISIT MOD MDM: CPT

## 2020-11-14 NOTE — ED INITIAL ASSESSMENT (HPI)
Patient reports intermitted chest pain on the left side that started last night. States its a dull pressure pain. Patient does not have any pain at this time. States he has seen  cards for this previously and everything was fine.    Hx- HTN

## 2020-11-14 NOTE — ED PROVIDER NOTES
Patient Seen in: [facility] AND [facility] Emergency Department    History   Patient presents with:  Chest Pain Angina    Stated Complaint: chest pain    HPI    Patient is here for chest pain that he experienced 3 times.   Once last night while sitting watching T by mouth every morning. metFORMIN HCl  MG Oral Tablet 24 Hr,  Take 1 tablet (500 mg total) by mouth nightly. Blood Glucose Calibration (ACCU-CHEK PRATIMA) In Vitro Solution,     Accu-Chek Softclix Lancets Does not apply Misc,     Lancets Misc.  (ACC During period of observation in the ER he was cardiac monitored without any abnormal heartbeats. He has not had any return of symptoms. I discussed work-up with him and his wife I did discuss limitations of testing in the emergency department.   He has se months to obtain basic health screening including reassessment of your blood pressure.       Clinical Impression:  Chest pain, atypical  (primary encounter diagnosis)    Disposition:  Discharge    Follow-up:  Andrea Sheffield MD  [address] [facility] 2

## 2020-12-14 RX ORDER — METFORMIN HYDROCHLORIDE 500 MG/1
500 TABLET, EXTENDED RELEASE ORAL NIGHTLY
Qty: 90 TABLET | Refills: 1 | Status: SHIPPED | OUTPATIENT
Start: 2020-12-14 | End: 2021-09-22

## 2020-12-22 ENCOUNTER — TELEPHONE (OUTPATIENT)
Dept: FAMILY MEDICINE CLINIC | Facility: CLINIC | Age: 75
End: 2020-12-22

## 2020-12-28 ENCOUNTER — TELEPHONE (OUTPATIENT)
Dept: GASTROENTEROLOGY | Facility: CLINIC | Age: 75
End: 2020-12-28

## 2020-12-28 DIAGNOSIS — Z87.738: ICD-10-CM

## 2020-12-28 DIAGNOSIS — R13.10 ABNORMAL SWALLOWING: ICD-10-CM

## 2020-12-28 DIAGNOSIS — R13.10 DYSPHAGIA, UNSPECIFIED TYPE: Primary | ICD-10-CM

## 2020-12-28 NOTE — TELEPHONE ENCOUNTER
Patient is having issues with Dysphagia -patient wants to know if he should schedule EGD? Please call. Thank you.

## 2020-12-28 NOTE — TELEPHONE ENCOUNTER
Ananya/Will forward to Dr. Delonte Ruff but out of office today:    Patient reports increased difficulty with swallowing the past three days. What he eats is going down, but very slowly. Even with water he can only slowly drink small amounts.   He has to

## 2020-12-28 NOTE — TELEPHONE ENCOUNTER
Schedulers:  Patient advised to be expecting a call to help schedule EGD per below orders from [address] [place] [address]. [facility]. Please contact patient to assist with scheduling this procedure.

## 2020-12-28 NOTE — TELEPHONE ENCOUNTER
Lets arrange for the procedure to be performed on 1/7/2014 at any time with MAC. Hold metformin the evening before and the morning of the procedure.

## 2020-12-28 NOTE — TELEPHONE ENCOUNTER
Scheduled for:  EGD w/DIL - 34768  Provider Name:  Dr. Pal Londono  Date:  1/7/21  Location:  [facility]  Sedation:  MAC  Time:  3:15 pm (pt is aware to arrive at 2:15 pm)  Prep:  NPO after midnight, Prep instructions were given to pt over the phone, pt verbaliz

## 2020-12-28 NOTE — TELEPHONE ENCOUNTER
I spoke with the patient regarding his symptoms. He describes primarily solid food dysphagia however he does describe some liquid dysphagia with large gulps of liquid.   S/p EGD with dilatation in July 2020 with the patient started on PPIs due to finding o

## 2021-01-04 ENCOUNTER — TELEPHONE (OUTPATIENT)
Dept: GASTROENTEROLOGY | Facility: CLINIC | Age: 76
End: 2021-01-04

## 2021-01-04 DIAGNOSIS — R13.10 ABNORMAL SWALLOWING: ICD-10-CM

## 2021-01-04 DIAGNOSIS — R13.10 DYSPHAGIA, UNSPECIFIED TYPE: Primary | ICD-10-CM

## 2021-01-04 DIAGNOSIS — Z87.738: ICD-10-CM

## 2021-01-04 NOTE — TELEPHONE ENCOUNTER
Dr Kiana Garza- Patient canceled procedure, stated he is feeling much better now and would like to hold off on it.       Canceled for:  EGD w/DIL - 90532  Provider Name:  Dr. Marv Vargas  Date:  1/7/21  Location:  [facility]  Sedation:  MAC  Time:  3:15 pm (

## 2021-01-04 NOTE — TELEPHONE ENCOUNTER
Natacha Gunn states patient called to cancel 1/7/2021 EGD. For additional questions please call. Thank you.

## 2021-01-19 ENCOUNTER — NURSE TRIAGE (OUTPATIENT)
Dept: FAMILY MEDICINE CLINIC | Facility: CLINIC | Age: 76
End: 2021-01-19

## 2021-01-19 ENCOUNTER — OFFICE VISIT (OUTPATIENT)
Dept: FAMILY MEDICINE CLINIC | Facility: CLINIC | Age: 76
End: 2021-01-19
Payer: MEDICARE

## 2021-01-19 VITALS
BODY MASS INDEX: 21.82 KG/M2 | HEIGHT: 67 IN | HEART RATE: 64 BPM | DIASTOLIC BLOOD PRESSURE: 81 MMHG | SYSTOLIC BLOOD PRESSURE: 143 MMHG | WEIGHT: 139 LBS

## 2021-01-19 DIAGNOSIS — K13.79 ORAL PAIN OF UNKNOWN ETIOLOGY: Primary | ICD-10-CM

## 2021-01-19 PROCEDURE — 99213 OFFICE O/P EST LOW 20 MIN: CPT | Performed by: NURSE PRACTITIONER

## 2021-01-19 PROCEDURE — 3008F BODY MASS INDEX DOCD: CPT | Performed by: NURSE PRACTITIONER

## 2021-01-19 PROCEDURE — 3077F SYST BP >= 140 MM HG: CPT | Performed by: NURSE PRACTITIONER

## 2021-01-19 PROCEDURE — 3079F DIAST BP 80-89 MM HG: CPT | Performed by: NURSE PRACTITIONER

## 2021-01-19 NOTE — PROGRESS NOTES
HPI  Pt here for chapped red lips and burning pain in mouth. Will sometimes notice that toothpaste, mouthwash and some foods will burn tongue. Having a hard time eating any foods with any spice.  Has a lot of burning in mouth abbie in am.   Has a lot of dry file      Food insecurity        Worry: Not on file        Inability: Not on file      Transportation needs        Medical: Not on file        Non-medical: Not on file    Tobacco Use      Smoking status: Former Smoker        Years: 5.00      Smokeless toba 1:1:1 Take 5-10 mL by mouth TID AC&HS. Swish and Smallow or Swish and Spit 90 mL 1   • metFORMIN HCl  MG Oral Tablet 24 Hr Take 1 tablet (500 mg total) by mouth nightly.  90 tablet 1   • Glucose Blood (ACCU-CHEK PRATIMA PLUS) In Vitro Strip Check glucos

## 2021-01-19 NOTE — TELEPHONE ENCOUNTER
Action Requested: Summary for Provider     []  Critical Lab, Recommendations Needed  [] Need Additional Advice  []   FYI    []   Need Orders  [] Need Medications Sent to Pharmacy  []  Other     SUMMARY: onset about two weeks, burning and soreness, redness,

## 2021-01-19 NOTE — TELEPHONE ENCOUNTER
Action Requested: Summary for Provider     []  Critical Lab, Recommendations Needed  [] Need Additional Advice  []   FYI    []   Need Orders  [] Need Medications Sent to Pharmacy  []  Other     SUMMARY: onset about two weeks,     Reason for call: No chief

## 2021-01-20 ENCOUNTER — TELEPHONE (OUTPATIENT)
Dept: FAMILY MEDICINE CLINIC | Facility: CLINIC | Age: 76
End: 2021-01-20

## 2021-01-20 DIAGNOSIS — K13.79 ORAL PAIN OF UNKNOWN ETIOLOGY: ICD-10-CM

## 2021-01-20 DIAGNOSIS — K13.79 ORAL PAIN OF UNKNOWN ETIOLOGY: Primary | ICD-10-CM

## 2021-01-20 NOTE — TELEPHONE ENCOUNTER
Verified name and  of patient.   Son of patient calling to ask that the following prescription sent by JENNIFER Griffin be sent to another pharmacy:  Medication Quantity Refills Start End   benadryl/lidocaine/mylanta 1:1:1 90 mL 1 2021    Sig:   Take 5-1

## 2021-01-20 NOTE — TELEPHONE ENCOUNTER
[facility] called and states [facility] [place] [facility]'t the medication for the oral solution. He is now in [place] and now wants it sent [facility] [place] on file. This medication cant be sent in electronically, it has been set to print.  Once printed can

## 2021-01-20 NOTE — TELEPHONE ENCOUNTER
Patient called saying  the oral and maxillofacial surgeon won't see him because they don't take care of mouth issues. I asked him if he has a regular dentist and he says he doesn't.  He would like to know who else he can see

## 2021-01-21 NOTE — TELEPHONE ENCOUNTER
I called the patient who stated he still wants the medication be [facility] [place]. I called and the [facility] [place] and they do not have.     I called the patient and we discussed the 5 [facility] and they can make the [employer]

## 2021-01-21 NOTE — TELEPHONE ENCOUNTER
Please call pharmacy for magic mouth wash as prescribed yesterday-I am out of the office until mid morning tomorrow

## 2021-01-21 NOTE — ASSESSMENT & PLAN NOTE
Uncertain if related to GERD or vitiligo (though no white or pale patches noted in mouth, cheeks or gums)  Refer oral sx for eval  F/u gastro

## 2021-01-28 ENCOUNTER — LAB ENCOUNTER (OUTPATIENT)
Dept: LAB | Age: 76
End: 2021-01-28
Attending: FAMILY MEDICINE
Payer: MEDICARE

## 2021-01-28 DIAGNOSIS — E55.9 VITAMIN D DEFICIENCY: ICD-10-CM

## 2021-01-28 DIAGNOSIS — E78.5 DYSLIPIDEMIA WITH ELEVATED LOW DENSITY LIPOPROTEIN (LDL) CHOLESTEROL AND ABNORMALLY LOW HIGH DENSITY LIPOPROTEIN CHOLESTEROL: ICD-10-CM

## 2021-01-28 DIAGNOSIS — E11.49 TYPE 2 DIABETES MELLITUS WITH OTHER NEUROLOGIC COMPLICATION, WITHOUT LONG-TERM CURRENT USE OF INSULIN (HCC): ICD-10-CM

## 2021-01-28 LAB
ALBUMIN SERPL-MCNC: 4 G/DL (ref 3.4–5)
ALBUMIN/GLOB SERPL: 1 {RATIO} (ref 1–2)
ALP LIVER SERPL-CCNC: 74 U/L
ALT SERPL-CCNC: 18 U/L
ANION GAP SERPL CALC-SCNC: 3 MMOL/L (ref 0–18)
AST SERPL-CCNC: 12 U/L (ref 15–37)
BILIRUB SERPL-MCNC: 0.8 MG/DL (ref 0.1–2)
BUN BLD-MCNC: 15 MG/DL (ref 7–18)
BUN/CREAT SERPL: 15.8 (ref 10–20)
CALCIUM BLD-MCNC: 9.3 MG/DL (ref 8.5–10.1)
CHLORIDE SERPL-SCNC: 104 MMOL/L (ref 98–112)
CHOLEST SMN-MCNC: 201 MG/DL (ref ?–200)
CO2 SERPL-SCNC: 29 MMOL/L (ref 21–32)
CREAT BLD-MCNC: 0.95 MG/DL
CREAT UR-SCNC: 87.5 MG/DL
EST. AVERAGE GLUCOSE BLD GHB EST-MCNC: 128 MG/DL (ref 68–126)
GLOBULIN PLAS-MCNC: 4.1 G/DL (ref 2.8–4.4)
GLUCOSE BLD-MCNC: 106 MG/DL (ref 70–99)
HBA1C MFR BLD HPLC: 6.1 % (ref ?–5.7)
HDLC SERPL-MCNC: 49 MG/DL (ref 40–59)
LDLC SERPL CALC-MCNC: 117 MG/DL (ref ?–100)
M PROTEIN MFR SERPL ELPH: 8.1 G/DL (ref 6.4–8.2)
MICROALBUMIN UR-MCNC: 30.5 MG/DL
MICROALBUMIN/CREAT 24H UR-RTO: 348.6 UG/MG (ref ?–30)
NONHDLC SERPL-MCNC: 152 MG/DL (ref ?–130)
OSMOLALITY SERPL CALC.SUM OF ELEC: 283 MOSM/KG (ref 275–295)
PATIENT FASTING Y/N/NP: YES
PATIENT FASTING Y/N/NP: YES
POTASSIUM SERPL-SCNC: 4.8 MMOL/L (ref 3.5–5.1)
SODIUM SERPL-SCNC: 136 MMOL/L (ref 136–145)
TRIGL SERPL-MCNC: 174 MG/DL (ref 30–149)
VLDLC SERPL CALC-MCNC: 35 MG/DL (ref 0–30)

## 2021-01-28 PROCEDURE — 82043 UR ALBUMIN QUANTITATIVE: CPT

## 2021-01-28 PROCEDURE — 82570 ASSAY OF URINE CREATININE: CPT

## 2021-01-28 PROCEDURE — 80053 COMPREHEN METABOLIC PANEL: CPT

## 2021-01-28 PROCEDURE — 80061 LIPID PANEL: CPT

## 2021-01-28 PROCEDURE — 83036 HEMOGLOBIN GLYCOSYLATED A1C: CPT

## 2021-01-28 PROCEDURE — 36415 COLL VENOUS BLD VENIPUNCTURE: CPT

## 2021-01-28 PROCEDURE — 82306 VITAMIN D 25 HYDROXY: CPT

## 2021-01-29 LAB — 25(OH)D3 SERPL-MCNC: 22.6 NG/ML (ref 30–100)

## 2021-02-01 DIAGNOSIS — R80.9 ALBUMINURIA: Primary | ICD-10-CM

## 2021-02-02 NOTE — PROGRESS NOTES
Darrian Castro - Your Vitamin D levels are mildly decreased. Please take over the counter daily vitamin D 2000 units. Your diabetes is well controlled.  I am concerned with the amount of protein in your urine and would like you to see a kidney specialist. I ha

## 2021-02-24 ENCOUNTER — OFFICE VISIT (OUTPATIENT)
Dept: NEPHROLOGY | Facility: CLINIC | Age: 76
End: 2021-02-24
Payer: MEDICARE

## 2021-02-24 VITALS
HEIGHT: 67 IN | HEART RATE: 62 BPM | DIASTOLIC BLOOD PRESSURE: 82 MMHG | WEIGHT: 140 LBS | SYSTOLIC BLOOD PRESSURE: 140 MMHG | BODY MASS INDEX: 21.97 KG/M2

## 2021-02-24 DIAGNOSIS — R80.9 PROTEINURIA, UNSPECIFIED TYPE: Primary | ICD-10-CM

## 2021-02-24 PROCEDURE — 3079F DIAST BP 80-89 MM HG: CPT | Performed by: INTERNAL MEDICINE

## 2021-02-24 PROCEDURE — 3077F SYST BP >= 140 MM HG: CPT | Performed by: INTERNAL MEDICINE

## 2021-02-24 PROCEDURE — 3008F BODY MASS INDEX DOCD: CPT | Performed by: INTERNAL MEDICINE

## 2021-02-24 PROCEDURE — 99205 OFFICE O/P NEW HI 60 MIN: CPT | Performed by: INTERNAL MEDICINE

## 2021-02-24 RX ORDER — SIMVASTATIN 5 MG
5 TABLET ORAL NIGHTLY
COMMUNITY
End: 2021-05-03

## 2021-02-25 NOTE — PROGRESS NOTES
02/24/21        Patient: Seleta Bolus   YOB: 1945   Date of Visit: 2/24/2021       Dear  Dr. Irineo Najjar, MD,      Thank you for referring Seleta Bolus to my practice. Please find my assessment and plan below.       As you know he i murmurs or rubs. Abdomen was soft, flat, nontender without organomegaly, masses or bruits. Extremities revealed no edema. I therefore informed the patient that he does have some mild microalbuminuria.   I reassured him that his albumin and renal functi

## 2021-02-25 NOTE — PATIENT INSTRUCTIONS
Please do laboratory studies and kidney ultrasound as ordered. Continue to monitor your blood pressures regularly.

## 2021-03-04 ENCOUNTER — HOSPITAL ENCOUNTER (OUTPATIENT)
Dept: ULTRASOUND IMAGING | Age: 76
Discharge: HOME | End: 2021-03-04
Attending: INTERNAL MEDICINE
Payer: MEDICARE

## 2021-03-04 DIAGNOSIS — R80.9 PROTEINURIA, UNSPECIFIED TYPE: ICD-10-CM

## 2021-03-04 PROCEDURE — 76770 US EXAM ABDO BACK WALL COMP: CPT | Performed by: INTERNAL MEDICINE

## 2021-03-05 DIAGNOSIS — Z23 NEED FOR VACCINATION: ICD-10-CM

## 2021-03-08 ENCOUNTER — TELEPHONE (OUTPATIENT)
Dept: GASTROENTEROLOGY | Facility: CLINIC | Age: 76
End: 2021-03-08

## 2021-03-08 DIAGNOSIS — Z87.738: ICD-10-CM

## 2021-03-08 DIAGNOSIS — R13.10 ABNORMAL SWALLOWING: ICD-10-CM

## 2021-03-08 DIAGNOSIS — R13.10 DYSPHAGIA, UNSPECIFIED TYPE: Primary | ICD-10-CM

## 2021-03-08 NOTE — TELEPHONE ENCOUNTER
Pt states he had cancelled his endoscopy that was scheduled 1-7-21 because he was feeling so much better, but now he is having symptoms again and wants to reschedule it.  Please call

## 2021-03-09 ENCOUNTER — TELEPHONE (OUTPATIENT)
Dept: CASE MANAGEMENT | Age: 76
End: 2021-03-09

## 2021-03-09 NOTE — TELEPHONE ENCOUNTER
Patient is eligible for a 2021 Medicare Annual Wellness visit. Left message to call back [phone].

## 2021-03-16 NOTE — TELEPHONE ENCOUNTER
Scheduled for:  EGD w/DIL - 79830  Provider Name:  Dr. David Dey  Date: 04/19/2021  Location:  [facility]  Sedation:  IV  Time:  11:30AM (pt is aware to arrive at 10:30AM)    Prep:  NPO after midnight, Prep instructions were given to pt over the phone, pt verb

## 2021-03-22 ENCOUNTER — NURSE TRIAGE (OUTPATIENT)
Dept: FAMILY MEDICINE CLINIC | Facility: CLINIC | Age: 76
End: 2021-03-22

## 2021-03-22 NOTE — TELEPHONE ENCOUNTER
Action Requested: Summary for Provider     []  Critical Lab, Recommendations Needed  [] Need Additional Advice  []   FYI    []   Need Orders  [] Need Medications Sent to Pharmacy  []  Other     SUMMARY: Patient given appt for evaluation.     Reason for call

## 2021-03-24 ENCOUNTER — OFFICE VISIT (OUTPATIENT)
Dept: FAMILY MEDICINE CLINIC | Facility: CLINIC | Age: 76
End: 2021-03-24
Payer: MEDICARE

## 2021-03-24 VITALS
SYSTOLIC BLOOD PRESSURE: 130 MMHG | HEART RATE: 70 BPM | DIASTOLIC BLOOD PRESSURE: 80 MMHG | WEIGHT: 140.81 LBS | TEMPERATURE: 97 F | BODY MASS INDEX: 22.1 KG/M2 | HEIGHT: 67 IN

## 2021-03-24 DIAGNOSIS — K14.6 BURNING MOUTH SYNDROME: Primary | ICD-10-CM

## 2021-03-24 PROCEDURE — 3079F DIAST BP 80-89 MM HG: CPT | Performed by: FAMILY MEDICINE

## 2021-03-24 PROCEDURE — 3008F BODY MASS INDEX DOCD: CPT | Performed by: FAMILY MEDICINE

## 2021-03-24 PROCEDURE — 3075F SYST BP GE 130 - 139MM HG: CPT | Performed by: FAMILY MEDICINE

## 2021-03-24 PROCEDURE — 99213 OFFICE O/P EST LOW 20 MIN: CPT | Performed by: FAMILY MEDICINE

## 2021-03-24 NOTE — PROGRESS NOTES
Chey Fink is a 76year old male. Patient presents with:  Derm Problem: redness, dryness, burning sensation on lips x 1 month    HPI:   Pt reports received covid vaccines and did ok.    Reports now having issues on his lips with dryness and rednes otherwise  SKIN: pos skin changes  HEENT: denies eye complaints,denies sore throat, denies ear pain - pain in his mouth   RESPIRATORY: denies shortness of breath, denies cough  CARDIOVASCULAR: denies chest pain  GI: denies abdominal pain and denies heartbu

## 2021-04-02 ENCOUNTER — TELEPHONE (OUTPATIENT)
Dept: FAMILY MEDICINE CLINIC | Facility: CLINIC | Age: 76
End: 2021-04-02

## 2021-04-02 DIAGNOSIS — K13.79 ORAL PAIN OF UNKNOWN ETIOLOGY: Primary | ICD-10-CM

## 2021-04-02 NOTE — TELEPHONE ENCOUNTER
Patient was seen by dentist on 4/1/21 and they recommend patient see an immunologist. referral pended.

## 2021-04-05 ENCOUNTER — TELEPHONE (OUTPATIENT)
Dept: FAMILY MEDICINE CLINIC | Facility: CLINIC | Age: 76
End: 2021-04-05

## 2021-04-08 ENCOUNTER — TELEPHONE (OUTPATIENT)
Dept: FAMILY MEDICINE CLINIC | Facility: CLINIC | Age: 76
End: 2021-04-08

## 2021-04-08 DIAGNOSIS — L98.9 SKIN DISEASE: Primary | ICD-10-CM

## 2021-04-08 NOTE — TELEPHONE ENCOUNTER
Action Requested: Summary for Provider     []  Critical Lab, Recommendations Needed  [x] Need Additional Advice  []   FYI    []   Need Orders  [] Need Medications Sent to Pharmacy  []  Other     SUMMARY:   Spoke with pt,  verified,pt is req referral to

## 2021-04-09 NOTE — TELEPHONE ENCOUNTER
Patient returned call. Informed of  message. Gave patient phone number for Hermelindo Martinez [phone]. No further questions.

## 2021-04-17 ENCOUNTER — LAB ENCOUNTER (OUTPATIENT)
Dept: LAB | Age: 76
End: 2021-04-17
Attending: INTERNAL MEDICINE
Payer: MEDICARE

## 2021-04-17 DIAGNOSIS — Z01.818 PRE-OP TESTING: ICD-10-CM

## 2021-04-19 ENCOUNTER — ANESTHESIA (OUTPATIENT)
Dept: ENDOSCOPY | Facility: HOSPITAL | Age: 76
End: 2021-04-19
Payer: MEDICARE

## 2021-04-19 ENCOUNTER — ANESTHESIA EVENT (OUTPATIENT)
Dept: ENDOSCOPY | Facility: HOSPITAL | Age: 76
End: 2021-04-19
Payer: MEDICARE

## 2021-04-19 ENCOUNTER — HOSPITAL ENCOUNTER (OUTPATIENT)
Facility: HOSPITAL | Age: 76
Setting detail: HOSPITAL OUTPATIENT SURGERY
Discharge: HOME | End: 2021-04-19
Attending: INTERNAL MEDICINE | Admitting: INTERNAL MEDICINE
Payer: MEDICARE

## 2021-04-19 VITALS
OXYGEN SATURATION: 99 % | DIASTOLIC BLOOD PRESSURE: 56 MMHG | WEIGHT: 140 LBS | BODY MASS INDEX: 21.97 KG/M2 | HEART RATE: 80 BPM | SYSTOLIC BLOOD PRESSURE: 111 MMHG | HEIGHT: 67 IN | RESPIRATION RATE: 17 BRPM | TEMPERATURE: 96 F

## 2021-04-19 DIAGNOSIS — Z87.738: ICD-10-CM

## 2021-04-19 DIAGNOSIS — R13.10 ABNORMAL SWALLOWING: ICD-10-CM

## 2021-04-19 DIAGNOSIS — Q39.4 ESOPHAGEAL WEB: ICD-10-CM

## 2021-04-19 DIAGNOSIS — K22.70 BARRETT'S ESOPHAGUS: ICD-10-CM

## 2021-04-19 DIAGNOSIS — R13.10 DYSPHAGIA, UNSPECIFIED TYPE: ICD-10-CM

## 2021-04-19 DIAGNOSIS — K44.9 HIATAL HERNIA: ICD-10-CM

## 2021-04-19 DIAGNOSIS — Z01.818 PRE-OP TESTING: Primary | ICD-10-CM

## 2021-04-19 PROCEDURE — 0DB28ZX EXCISION OF MIDDLE ESOPHAGUS, VIA NATURAL OR ARTIFICIAL OPENING ENDOSCOPIC, DIAGNOSTIC: ICD-10-PCS | Performed by: INTERNAL MEDICINE

## 2021-04-19 PROCEDURE — 43239 EGD BIOPSY SINGLE/MULTIPLE: CPT | Performed by: INTERNAL MEDICINE

## 2021-04-19 PROCEDURE — 0DB18ZX EXCISION OF UPPER ESOPHAGUS, VIA NATURAL OR ARTIFICIAL OPENING ENDOSCOPIC, DIAGNOSTIC: ICD-10-PCS | Performed by: INTERNAL MEDICINE

## 2021-04-19 PROCEDURE — 43248 EGD GUIDE WIRE INSERTION: CPT | Performed by: INTERNAL MEDICINE

## 2021-04-19 PROCEDURE — 0D738ZZ DILATION OF LOWER ESOPHAGUS, VIA NATURAL OR ARTIFICIAL OPENING ENDOSCOPIC: ICD-10-PCS | Performed by: INTERNAL MEDICINE

## 2021-04-19 RX ORDER — SODIUM CHLORIDE 0.9 % (FLUSH) 0.9 %
10 SYRINGE (ML) INJECTION AS NEEDED
Status: DISCONTINUED | OUTPATIENT
Start: 2021-04-19 | End: 2021-04-19

## 2021-04-19 RX ORDER — SODIUM CHLORIDE, SODIUM LACTATE, POTASSIUM CHLORIDE, CALCIUM CHLORIDE 600; 310; 30; 20 MG/100ML; MG/100ML; MG/100ML; MG/100ML
INJECTION, SOLUTION INTRAVENOUS CONTINUOUS
Status: DISCONTINUED | OUTPATIENT
Start: 2021-04-19 | End: 2021-04-19

## 2021-04-19 RX ORDER — MIDAZOLAM HYDROCHLORIDE 1 MG/ML
1 INJECTION INTRAMUSCULAR; INTRAVENOUS EVERY 5 MIN PRN
Status: DISCONTINUED | OUTPATIENT
Start: 2021-04-19 | End: 2021-04-19

## 2021-04-19 RX ORDER — GLYCOPYRROLATE 0.2 MG/ML
INJECTION, SOLUTION INTRAMUSCULAR; INTRAVENOUS AS NEEDED
Status: DISCONTINUED | OUTPATIENT
Start: 2021-04-19 | End: 2021-04-19 | Stop reason: SURG

## 2021-04-19 RX ORDER — DEXTROSE MONOHYDRATE 25 G/50ML
50 INJECTION, SOLUTION INTRAVENOUS
Status: DISCONTINUED | OUTPATIENT
Start: 2021-04-19 | End: 2021-04-19

## 2021-04-19 RX ORDER — LIDOCAINE HYDROCHLORIDE 10 MG/ML
INJECTION, SOLUTION EPIDURAL; INFILTRATION; INTRACAUDAL; PERINEURAL AS NEEDED
Status: DISCONTINUED | OUTPATIENT
Start: 2021-04-19 | End: 2021-04-19 | Stop reason: SURG

## 2021-04-19 RX ORDER — NALOXONE HYDROCHLORIDE 0.4 MG/ML
80 INJECTION, SOLUTION INTRAMUSCULAR; INTRAVENOUS; SUBCUTANEOUS AS NEEDED
Status: DISCONTINUED | OUTPATIENT
Start: 2021-04-19 | End: 2021-04-19

## 2021-04-19 RX ADMIN — GLYCOPYRROLATE 0.2 MG: 0.2 INJECTION, SOLUTION INTRAMUSCULAR; INTRAVENOUS at 11:43:00

## 2021-04-19 RX ADMIN — SODIUM CHLORIDE, SODIUM LACTATE, POTASSIUM CHLORIDE, CALCIUM CHLORIDE: 600; 310; 30; 20 INJECTION, SOLUTION INTRAVENOUS at 11:41:00

## 2021-04-19 RX ADMIN — LIDOCAINE HYDROCHLORIDE 50 MG: 10 INJECTION, SOLUTION EPIDURAL; INFILTRATION; INTRACAUDAL; PERINEURAL at 11:44:00

## 2021-04-19 RX ADMIN — SODIUM CHLORIDE, SODIUM LACTATE, POTASSIUM CHLORIDE, CALCIUM CHLORIDE: 600; 310; 30; 20 INJECTION, SOLUTION INTRAVENOUS at 12:00:00

## 2021-04-19 NOTE — ANESTHESIA PREPROCEDURE EVALUATION
Anesthesia PreOp Note    HPI:     Palmira De La Vega is a 76year old male who presents for preoperative consultation requested by: Cornelia Underwood MD    Date of Surgery: 4/19/2021    Procedure(s):  ESOPHAGOGASTRODUODENOSCOPY (EGD) with pasquale Rodriguez 90 mL, Rfl: 1, 4/18/2021  simvastatin 5 MG Oral Tab, Take 5 mg by mouth nightly.  , Disp: , Rfl:   metFORMIN HCl  MG Oral Tablet 24 Hr, Take 1 tablet (500 mg total) by mouth nightly., Disp: 90 tablet, Rfl: 1, 4/18/2021 at 0900  amLODIPine Besylate 10 Years: 5.00      Smokeless tobacco: Never Used    Vaping Use      Vaping Use: Never used    Substance and Sexual Activity      Alcohol use:  Yes        Alcohol/week: 0.0 standard drinks        Comment: once in a month      Drug use: No      Sexual activity: 01/28/2021    K 4.8 01/28/2021     01/28/2021    CO2 29.0 01/28/2021    BUN 15 01/28/2021    CREATSERUM 0.95 01/28/2021     (H) 01/28/2021    CA 9.3 01/28/2021          Vital Signs: Body mass index is 21.93 kg/m².    height is 1.702 m (5' 7\")

## 2021-04-19 NOTE — OPERATIVE REPORT
[facility] Endoscopy Report      Date of Procedure:  04/19/21        Preoperative Diagnosis:  1. 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 Nondysplastic short segment Garcia's esophagus      Postoperative Diagnosis:  1.   [employer] 10 mm dilator was passed over the wire with some resistance. The dilator and wire were removed as a unit. The endoscope was now able to be passed into the esophagus. There was fracture of the cervical esophageal web without signs of deeper injury.   [employer]

## 2021-04-19 NOTE — ANESTHESIA POSTPROCEDURE EVALUATION
Patient: Shari Silva    Procedure Summary     Date: 04/19/21 Room / Location: [facility] ENDOSCOPY 05 / [facility] ENDOSCOPY    Anesthesia Start: 0717 Anesthesia Stop: 7818    Procedure: ESOPHAGOGASTRODUODENOSCOPY (EGD) with possible Dilation (N/A ) Diagnosis:

## 2021-04-19 NOTE — H&P
History & Physical Examination    Patient Name: Agustín Mckeon  MRN: [id number]  CSN: [id number]  YOB: 1945    Diagnosis: Dysphagia and history of cervical esophageal web      benadryl/lidocaine/mylanta 1:1:1, Take 5-10 mL by mouth TID Date   • ELECTROCARDIOGRAM, COMPLETE  08-    Scanned to Media Tab - Date of Service 08-     Family History   Problem Relation Age of Onset   • Heart Disease Father    • Diabetes Mother    • Hypertension Mother      Social History    Tobacco U

## 2021-04-21 RX ORDER — BUDESONIDE 0.5 MG/2ML
INHALANT ORAL
Qty: 120 AMPULE | Refills: 3 | Status: SHIPPED | OUTPATIENT
Start: 2021-04-21 | End: 2021-08-09

## 2021-04-23 DIAGNOSIS — I77.1 TORTUOUS AORTA (HCC): ICD-10-CM

## 2021-04-23 DIAGNOSIS — I10 ESSENTIAL HYPERTENSION: ICD-10-CM

## 2021-04-24 RX ORDER — IRBESARTAN 300 MG/1
300 TABLET ORAL NIGHTLY
Qty: 90 TABLET | Refills: 1 | Status: SHIPPED | OUTPATIENT
Start: 2021-04-24 | End: 2021-09-22

## 2021-05-03 ENCOUNTER — OFFICE VISIT (OUTPATIENT)
Dept: FAMILY MEDICINE CLINIC | Facility: CLINIC | Age: 76
End: 2021-05-03
Payer: MEDICARE

## 2021-05-03 VITALS
HEART RATE: 68 BPM | SYSTOLIC BLOOD PRESSURE: 126 MMHG | DIASTOLIC BLOOD PRESSURE: 73 MMHG | HEIGHT: 67 IN | BODY MASS INDEX: 22.29 KG/M2 | TEMPERATURE: 97 F | WEIGHT: 142 LBS

## 2021-05-03 DIAGNOSIS — E78.5 DYSLIPIDEMIA WITH ELEVATED LOW DENSITY LIPOPROTEIN (LDL) CHOLESTEROL AND ABNORMALLY LOW HIGH DENSITY LIPOPROTEIN CHOLESTEROL: ICD-10-CM

## 2021-05-03 DIAGNOSIS — Z00.00 ENCOUNTER FOR ANNUAL HEALTH EXAMINATION: ICD-10-CM

## 2021-05-03 DIAGNOSIS — I77.1 TORTUOUS AORTA (HCC): ICD-10-CM

## 2021-05-03 DIAGNOSIS — I10 ESSENTIAL HYPERTENSION: Primary | ICD-10-CM

## 2021-05-03 DIAGNOSIS — E11.21 DIABETIC NEPHROPATHY ASSOCIATED WITH TYPE 2 DIABETES MELLITUS (HCC): ICD-10-CM

## 2021-05-03 DIAGNOSIS — I44.0 1ST DEGREE AV BLOCK: ICD-10-CM

## 2021-05-03 DIAGNOSIS — H93.13 TINNITUS OF BOTH EARS: ICD-10-CM

## 2021-05-03 DIAGNOSIS — N52.9 ERECTILE DYSFUNCTION, UNSPECIFIED ERECTILE DYSFUNCTION TYPE: ICD-10-CM

## 2021-05-03 DIAGNOSIS — E11.49 TYPE 2 DIABETES MELLITUS WITH OTHER NEUROLOGIC COMPLICATION, WITHOUT LONG-TERM CURRENT USE OF INSULIN (HCC): ICD-10-CM

## 2021-05-03 DIAGNOSIS — K20.80 LYMPHOCYTIC ESOPHAGITIS: ICD-10-CM

## 2021-05-03 DIAGNOSIS — N18.2 STAGE 2 CHRONIC KIDNEY DISEASE: ICD-10-CM

## 2021-05-03 PROBLEM — K13.79 ORAL PAIN OF UNKNOWN ETIOLOGY: Status: RESOLVED | Noted: 2021-01-19 | Resolved: 2021-05-03

## 2021-05-03 PROBLEM — N18.1 CHRONIC KIDNEY DISEASE (CKD), STAGE I: Status: RESOLVED | Noted: 2017-04-24 | Resolved: 2021-05-03

## 2021-05-03 PROBLEM — R00.1 SINUS BRADYCARDIA: Status: RESOLVED | Noted: 2020-07-30 | Resolved: 2021-05-03

## 2021-05-03 PROCEDURE — 3008F BODY MASS INDEX DOCD: CPT | Performed by: FAMILY MEDICINE

## 2021-05-03 PROCEDURE — G0439 PPPS, SUBSEQ VISIT: HCPCS | Performed by: FAMILY MEDICINE

## 2021-05-03 PROCEDURE — 96160 PT-FOCUSED HLTH RISK ASSMT: CPT | Performed by: FAMILY MEDICINE

## 2021-05-03 PROCEDURE — 3078F DIAST BP <80 MM HG: CPT | Performed by: FAMILY MEDICINE

## 2021-05-03 PROCEDURE — 99397 PER PM REEVAL EST PAT 65+ YR: CPT | Performed by: FAMILY MEDICINE

## 2021-05-03 PROCEDURE — 3074F SYST BP LT 130 MM HG: CPT | Performed by: FAMILY MEDICINE

## 2021-05-03 NOTE — PROGRESS NOTES
HPI:   Clive Miranda is a 76year old male who presents for a Medicare Subsequent Annual Wellness visit (Pt already had Initial Annual Wellness). Pt here for regular physical. Staying active. Walking regularly and feels good.  Reports throat - s Team:  Red Vanegas MD as PCP - General (Family Medicine)  Azucena Torrez CancerDO as PCP - Family Practitioner (Family Practice)  Debra Austin DO (INFECTIOUS DISEASES)    Patient Active Problem List:     Type II diabetes mellitus ([facility] [address]     Dyslipi morning.   Blood Glucose Calibration (ACCU-CHEK PRATIMA) In Vitro Solution,   Accu-Chek Softclix Lancets Does not apply Misc,   Lancets Misc. (ACCU-CHEK SOFTCLIX LANCET DEV) Does not apply Kit,        MEDICAL INFORMATION:   He  has a past medical history of C following the conversations when two or more people are talking at the same time: No   I have trouble understanding things on the TV: No I have to strain to understand conversations: Yes   I have to worry about missing the telephone ring or doorbell: No I non-tender, bowel sounds active all four quadrants,  no masses, no organomegaly   Genitalia: Deferred    Rectal:    Extremities: Extremities normal, atraumatic, no cyanosis or edema   Pulses: 2+ and symmetric   Skin: Skin color, texture, turgor normal, no esophagitis  Per GI. On treatment. Improving. 11. Encounter for annual health examination        Diet assessment: good     PLAN:  The patient indicates understanding of these issues and agrees to the plan.   Reinforced healthy diet, lifestyle, and exerc Diabetics, FHx Glaucoma, AA>50, > 65 Data entered on: 9/24/2020   Last Dilated Eye Exam 6/30/2020       Prostate Cancer Screening      PSA  Annually PSA due on 10/10/2021  Update Health Maintenance if applicable     Immunizations (Update Puja Number HEMOGLOBIN A1c (% of total Hgb)   Date Value   11/04/2013 6.4 (H)     HgbA1C (%)   Date Value   01/28/2021 6.1 (H)       No flowsheet data found.     Creat/alb ratio  Annually Malb/Cre Calc (ug/mg)   Date Value   01/28/2021 348.6 (H)        LDL  Annually LD

## 2021-05-03 NOTE — PATIENT INSTRUCTIONS
Maxwell Marks's SCREENING SCHEDULE   Tests on this list are recommended by your physician but may not be covered, or covered at this frequency, by your insurer. Please check with your insurance carrier before scheduling to verify coverage.     PREV 65-75)  No results found for this or any previous visit.  Limited to patients who meet one of the following criteria:   • Men who are 73-68 years old and have smoked more than 100 cigarettes in their lifetime   • Anyone with a family history    Colorectal C Moderate/High Risk No orders found for this or any previous visit.  Medium/high risk factors:   End-stage renal disease   Hemophiliacs who received Factor VIII or IX concentrates   Clients of institutions for the mentally retarded   Persons who live in the

## 2021-05-19 ENCOUNTER — TELEPHONE (OUTPATIENT)
Dept: GASTROENTEROLOGY | Facility: CLINIC | Age: 76
End: 2021-05-19

## 2021-05-19 NOTE — TELEPHONE ENCOUNTER
Dr. Palomo Martinez    Patient reports he has been on budesonide for almost 1 month and is feeling much better.   Reports his swallowing is normal.    He wants to know if he should stay on this medication since it has been about a month and he was told you wan

## 2021-05-21 NOTE — TELEPHONE ENCOUNTER
I spoke to the patient. He is feeling \"so much better\". His swallowing is normal.  He has been taking 1 mg twice daily of budesonide for lymphocytic esophagitis. He will decrease to 0.5 mg twice a day.   He will contact me in 1 month with a symptom upd

## 2021-05-27 ENCOUNTER — OFFICE VISIT (OUTPATIENT)
Dept: DERMATOLOGY CLINIC | Facility: CLINIC | Age: 76
End: 2021-05-27
Payer: MEDICARE

## 2021-05-27 DIAGNOSIS — K14.6 BURNING MOUTH SYNDROME: Primary | ICD-10-CM

## 2021-05-27 DIAGNOSIS — L80 VITILIGO: ICD-10-CM

## 2021-05-27 DIAGNOSIS — L30.9 DERMATITIS: ICD-10-CM

## 2021-05-27 PROCEDURE — 99213 OFFICE O/P EST LOW 20 MIN: CPT | Performed by: DERMATOLOGY

## 2021-05-27 RX ORDER — TACROLIMUS 1 MG/G
1 OINTMENT TOPICAL 2 TIMES DAILY
Qty: 60 G | Refills: 3 | Status: SHIPPED | OUTPATIENT
Start: 2021-05-27 | End: 2021-11-09 | Stop reason: ALTCHOICE

## 2021-05-27 NOTE — PROGRESS NOTES
HPI:     Chief Complaint     Derm Problem        HPI     Derm Problem      Additional comments: LOV 9/13/2018 New Patient present with discoloration on the lips . Patient c/o burning on inside of mouth . Patient states shortly after burning , discoloration and swallow twice daily. Rinse your mouth after taking and spit. Do not eat or drink for 30 minutes after use. 120 ampule 3   • benadryl/lidocaine/mylanta 1:1:1 Take 5-10 mL by mouth TID AC&HS.  Swish and Smallow or Swish and Spit 90 mL 1   • metFORMIN HC Concerns:         Service: Not Asked        Blood Transfusions: Not Asked        Caffeine Concern: Yes          (Coffee, Tea) 2 cups daily        Occupational Exposure: Not Asked        Hobby Hazards: Not Asked        Sleep Concern: Not Asked lower lip is pink with 1 remaining area of brown pigment. There is a little bit of loss of pigment of the lower aspect of the upper lip. 3.  Patient does have some depigmented areas on arms and legs.       ASSESSMENT/PLAN:   Burning mouth syndrome  (prima

## 2021-06-23 ENCOUNTER — TELEPHONE (OUTPATIENT)
Dept: GASTROENTEROLOGY | Facility: CLINIC | Age: 76
End: 2021-06-23

## 2021-06-23 NOTE — TELEPHONE ENCOUNTER
BRIANDA. Patient is calling with a symptom update since decreasing budesonide to 0.5 mg twice daily. Will await for call to be returned.

## 2021-06-23 NOTE — TELEPHONE ENCOUNTER
Please contact the patient. The budesonide was prescribed for lymphocytic esophagitis and difficulty swallowing. If he has absolutely no symptoms I would recommend that he decrease to 0.5 mg of budesonide once a day.   I would like to see the patient in t

## 2021-06-23 NOTE — TELEPHONE ENCOUNTER
Dr. Lyubov Mcadams-    Please advise. Patient calling back with update as requested in TE from 5/19/21 after decreasing Budesonide 0.5 mg twice a day. I called back and spoke with Barbara Smith. I asked for a symptom update since decreasing budesonide.  He r

## 2021-06-24 NOTE — TELEPHONE ENCOUNTER
I called Nahum Reynolds to relay the below message from Dr. Eden Lackey. Left detailed message (ok per ISRRAEL). Requested he call back so that we can assist him in scheduling an office visit with Dr. Eden Lackey at the end of July for follow up.     CSS-If patient

## 2021-06-25 ENCOUNTER — TELEPHONE (OUTPATIENT)
Dept: GASTROENTEROLOGY | Facility: CLINIC | Age: 76
End: 2021-06-25

## 2021-06-25 NOTE — TELEPHONE ENCOUNTER
Current Outpatient Medications   Medication Sig Dispense Refill   • budesonide 0.5 MG/2ML Inhalation Suspension Mix 4 ml (1 mg) in honey or apple sauce and swallow twice daily. Rinse your mouth after taking and spit.   Do not eat or drink for 30 minutes af

## 2021-06-28 NOTE — TELEPHONE ENCOUNTER
Called and spoke with Prieto bonilla. He does not need a refill on the budesonide rx. He also is ready to schedule an office visit follow up appointment as instructed.  I scheduled him for   Future Appointments   Date Time Provider Kasey Amin   8/2/202

## 2021-07-22 DIAGNOSIS — I77.1 TORTUOUS AORTA (HCC): ICD-10-CM

## 2021-07-22 DIAGNOSIS — I10 ESSENTIAL HYPERTENSION: ICD-10-CM

## 2021-07-22 RX ORDER — AMLODIPINE BESYLATE 10 MG/1
TABLET ORAL
Qty: 90 TABLET | Refills: 1 | Status: SHIPPED | OUTPATIENT
Start: 2021-07-22 | End: 2021-09-22

## 2021-08-02 ENCOUNTER — OFFICE VISIT (OUTPATIENT)
Dept: GASTROENTEROLOGY | Facility: CLINIC | Age: 76
End: 2021-08-02
Payer: MEDICARE

## 2021-08-02 VITALS
WEIGHT: 143 LBS | BODY MASS INDEX: 22.44 KG/M2 | DIASTOLIC BLOOD PRESSURE: 77 MMHG | HEIGHT: 67 IN | SYSTOLIC BLOOD PRESSURE: 135 MMHG | HEART RATE: 65 BPM

## 2021-08-02 DIAGNOSIS — K20.80 LYMPHOCYTIC ESOPHAGITIS: Primary | ICD-10-CM

## 2021-08-02 DIAGNOSIS — R13.19 ESOPHAGEAL DYSPHAGIA: ICD-10-CM

## 2021-08-02 PROCEDURE — 3008F BODY MASS INDEX DOCD: CPT | Performed by: INTERNAL MEDICINE

## 2021-08-02 PROCEDURE — 3078F DIAST BP <80 MM HG: CPT | Performed by: INTERNAL MEDICINE

## 2021-08-02 PROCEDURE — 99213 OFFICE O/P EST LOW 20 MIN: CPT | Performed by: INTERNAL MEDICINE

## 2021-08-02 PROCEDURE — 3075F SYST BP GE 130 - 139MM HG: CPT | Performed by: INTERNAL MEDICINE

## 2021-08-02 NOTE — PATIENT INSTRUCTIONS
1.  Continue 0.5 mg of budesonide once daily. If the swallowing worsens increase the budesonide to 0.5 mg twice daily. 2.  Please contact me if the swallowing worsens despite the above.   3.  Use a fiber supplement (#2 fiber pills daily with a full glass

## 2021-08-08 PROBLEM — K20.80 LYMPHOCYTIC ESOPHAGITIS: Status: ACTIVE | Noted: 2021-08-08

## 2021-08-08 NOTE — PROGRESS NOTES
HPI/Subjective:   Patient ID: Es Christianson is a 68year old male. HPI  The patient returns in follow-up. He was last seen at the time of his upper endoscopy in April 2021.     As per previous notes the patient developed dysphagia to medications a Last 6 Encounters:  08/02/21 : 143 lb (64.9 kg)  05/03/21 : 142 lb (64.4 kg)  04/19/21 : 140 lb (63.5 kg)  03/24/21 : 140 lb 12.8 oz (63.9 kg)  02/24/21 : 140 lb (63.5 kg)  01/19/21 : 139 lb (63 kg)      Current Outpatient Medications   Medication Sig Disp Conjunctivae normal.   Neck:      Thyroid: No thyromegaly. Cardiovascular:      Rate and Rhythm: Normal rate and regular rhythm. Heart sounds: Normal heart sounds. No murmur heard.      Pulmonary:      Effort: Pulmonary effort is normal. No respirato continues.       Meds This Visit:  Requested Prescriptions      No prescriptions requested or ordered in this encounter       Imaging & Referrals:  None

## 2021-08-09 ENCOUNTER — HOSPITAL ENCOUNTER (OUTPATIENT)
Dept: LAB | Facility: HOSPITAL | Age: 76
Discharge: HOME | End: 2021-08-09
Attending: FAMILY MEDICINE
Payer: MEDICARE

## 2021-08-09 DIAGNOSIS — E11.49 TYPE 2 DIABETES MELLITUS WITH OTHER NEUROLOGIC COMPLICATION, WITHOUT LONG-TERM CURRENT USE OF INSULIN (HCC): ICD-10-CM

## 2021-08-09 LAB
ALBUMIN SERPL-MCNC: 4 G/DL (ref 3.4–5)
ALBUMIN/GLOB SERPL: 0.9 {RATIO} (ref 1–2)
ALP LIVER SERPL-CCNC: 69 U/L
ALT SERPL-CCNC: 19 U/L
ANION GAP SERPL CALC-SCNC: 2 MMOL/L (ref 0–18)
AST SERPL-CCNC: 15 U/L (ref 15–37)
BILIRUB SERPL-MCNC: 0.8 MG/DL (ref 0.1–2)
BUN BLD-MCNC: 18 MG/DL (ref 7–18)
CALCIUM BLD-MCNC: 9.2 MG/DL (ref 8.5–10.1)
CHLORIDE SERPL-SCNC: 104 MMOL/L (ref 98–112)
CHOLEST SMN-MCNC: 188 MG/DL (ref ?–200)
CO2 SERPL-SCNC: 30 MMOL/L (ref 21–32)
CREAT BLD-MCNC: 1.22 MG/DL
EST. AVERAGE GLUCOSE BLD GHB EST-MCNC: 134 MG/DL (ref 68–126)
GLOBULIN PLAS-MCNC: 4.3 G/DL (ref 2.8–4.4)
GLUCOSE BLD-MCNC: 115 MG/DL (ref 70–99)
HBA1C MFR BLD HPLC: 6.3 % (ref ?–5.7)
HDLC SERPL-MCNC: 48 MG/DL (ref 40–59)
LDLC SERPL CALC-MCNC: 115 MG/DL (ref ?–100)
M PROTEIN MFR SERPL ELPH: 8.3 G/DL (ref 6.4–8.2)
NONHDLC SERPL-MCNC: 140 MG/DL (ref ?–130)
OSMOLALITY SERPL CALC.SUM OF ELEC: 285 MOSM/KG (ref 275–295)
PATIENT FASTING Y/N/NP: YES
PATIENT FASTING Y/N/NP: YES
POTASSIUM SERPL-SCNC: 4.5 MMOL/L (ref 3.5–5.1)
SODIUM SERPL-SCNC: 136 MMOL/L (ref 136–145)
TRIGL SERPL-MCNC: 142 MG/DL (ref 30–149)
VLDLC SERPL CALC-MCNC: 25 MG/DL (ref 0–30)

## 2021-08-09 PROCEDURE — 80061 LIPID PANEL: CPT

## 2021-08-09 PROCEDURE — 36415 COLL VENOUS BLD VENIPUNCTURE: CPT

## 2021-08-09 PROCEDURE — 80053 COMPREHEN METABOLIC PANEL: CPT

## 2021-08-09 PROCEDURE — 83036 HEMOGLOBIN GLYCOSYLATED A1C: CPT

## 2021-08-09 RX ORDER — BUDESONIDE 0.5 MG/2ML
INHALANT ORAL
Qty: 180 EACH | Refills: 3 | Status: SHIPPED | OUTPATIENT
Start: 2021-08-09

## 2021-08-09 NOTE — TELEPHONE ENCOUNTER
•  budesonide 0.5 MG/2ML Inhalation Suspension, Mix 4 ml (1 mg) in honey or apple sauce and swallow twice daily. Rinse your mouth after taking and spit.   Do not eat or drink for 30 minutes after use., Disp: 120 ampule, Rfl: 3

## 2021-08-09 NOTE — TELEPHONE ENCOUNTER
Dr. Waldron Bank request for Budesonide. Please sign pended order if appropriate. Thank you!     LOV:8/2/21  LR:4/21/21

## 2021-08-12 NOTE — PROGRESS NOTES
Darrian Hermosillo - Your diabetes is stable with the current medications. Your cholesterol - LDL is a bit higher than we would like with diabetes. I would like to start you on pravastatin as it is a weaker statin and you may not have side effects.  Let me know if

## 2021-08-15 RX ORDER — PRAVASTATIN SODIUM 10 MG
10 TABLET ORAL NIGHTLY
Qty: 90 TABLET | Refills: 1 | Status: SHIPPED | OUTPATIENT
Start: 2021-08-15 | End: 2021-11-09 | Stop reason: ALTCHOICE

## 2021-08-18 NOTE — H&P
History & Physical Examination    Patient Name: Best Rodriguez  MRN: [id number]  CSN: [id number]  YOB: 1945    Diagnosis: Dysphagia and esophageal web on esophagram      FAMOTIDINE 40 MG Oral Tab, TAKE 1 TABLET BY MOUTH EVERY DAY, Disp: Ophthalmology   • High blood pressure    • Osteoporosis 2010   • Unspecified essential hypertension      Past Surgical History:   Procedure Laterality Date   • ELECTROCARDIOGRAM, COMPLETE  08-    Scanned to Media Tab - Date of Service 08- .

## 2021-09-22 DIAGNOSIS — I77.1 TORTUOUS AORTA (HCC): ICD-10-CM

## 2021-09-22 DIAGNOSIS — I10 ESSENTIAL HYPERTENSION: ICD-10-CM

## 2021-09-22 RX ORDER — OMEPRAZOLE 20 MG/1
20 CAPSULE, DELAYED RELEASE ORAL
Qty: 90 CAPSULE | Refills: 1 | Status: SHIPPED | OUTPATIENT
Start: 2021-09-22 | End: 2021-11-17

## 2021-09-22 RX ORDER — AMLODIPINE BESYLATE 10 MG/1
TABLET ORAL
Qty: 90 TABLET | Refills: 1 | Status: SHIPPED | OUTPATIENT
Start: 2021-09-22 | End: 2022-06-06

## 2021-09-22 RX ORDER — IRBESARTAN 300 MG/1
300 TABLET ORAL NIGHTLY
Qty: 90 TABLET | Refills: 1 | Status: SHIPPED | OUTPATIENT
Start: 2021-09-22 | End: 2022-04-04

## 2021-09-22 RX ORDER — METFORMIN HYDROCHLORIDE 500 MG/1
500 TABLET, EXTENDED RELEASE ORAL NIGHTLY
Qty: 90 TABLET | Refills: 1 | Status: SHIPPED | OUTPATIENT
Start: 2021-09-22 | End: 2022-04-04

## 2021-09-22 NOTE — TELEPHONE ENCOUNTER
Refill passed per [place] [facility] [place], [facility] protocol. Requested Prescriptions   Pending Prescriptions Disp Refills    AMLODIPINE 10 MG Oral Tab [Pharmacy Med Name: AMLODIPINE BESYLATE 10 MG Tablet] 90 tablet 1     Sig: TAKE 1 TABLET (10 MG TOTAL) BY MOUTH DAILY. Hypertensive Medications Protocol Passed - 9/22/2021 12:34 AM        Passed - CMP or BMP in past 12 months        Passed - Appointment in past 6 or next 3 months        Passed - GFR Non- > 50     Lab Results   Component Value Date    GFRNAA 57 (L) 08/09/2021                    OMEPRAZOLE 20 MG Oral Capsule Delayed Release [Pharmacy Med Name: OMEPRAZOLE 20 MG Capsule Delayed Release] 90 capsule 0     Sig: TAKE 1 CAPSULE EVERY MORNING BEFORE BREAKFAST        Gastrointestional Medication Protocol Passed - 9/22/2021 12:34 AM        Passed - Appointment in past 12 or next 3 months           IRBESARTAN 300 MG Oral Tab [Pharmacy Med Name: IRBESARTAN 300 MG Tablet] 90 tablet 1     Sig: Take 1 tablet (300 mg total) by mouth nightly. Hypertensive Medications Protocol Passed - 9/22/2021 12:34 AM        Passed - CMP or BMP in past 12 months        Passed - Appointment in past 6 or next 3 months        Passed - GFR Non- > 50     Lab Results   Component Value Date    GFRNAA 57 (L) 08/09/2021                    METFORMIN HCL  MG Oral Tablet 24 Hr [Pharmacy Med Name: Froy Callander  MG Tablet Extended Release 24 Hour] 90 tablet 1     Sig: Take 1 tablet (500 mg total) by mouth nightly.         Diabetes Medication Protocol Passed - 9/22/2021 12:34 AM        Passed - Last A1C < 7.5 and within past 6 months     Lab Results   Component Value Date    A1C 6.3 (H) 08/09/2021               Passed - Appointment in past 6 or next 3 months        Passed - GFR Non- > 50     Lab Results   Component Value Date    GFRNAA 57 (L) 08/09/2021                 Passed - GFR in the past 12 months               Future Appointments         Provider Department Appt Notes    In 1 month Lina Anderson MD Ophthalmology - [place] [address] Return in about 1 week (around 9/9/2021), or PI right eye.             Recent Outpatient Visits              2 weeks ago Primary open angle glaucoma (POAG) of both eyes, moderate stage    Ophthalmology - [address], Sheryle Denis, MD    Office Visit    3 weeks ago Primary open angle glaucoma (POAG) of both eyes, moderate stage    Ophthalmology - [address], Sheryle Denis, MD    Office Visit    1 month ago Lymphocytic esophagitis    Sophy Alcantara MD    Office Visit    3 months ago Primary open angle glaucoma (POAG) of both eyes, moderate stage    Ophthalmology - [address], Sheryle Denis, MD    Office Visit    3 months ago Burning mouth syndrome    [facility] Alen Garsia MD    Office Visit

## 2021-09-22 NOTE — TELEPHONE ENCOUNTER
Refill passed per [place] [facility] [place], [facility] protocol. Requested Prescriptions   Pending Prescriptions Disp Refills    AMLODIPINE 10 MG Oral Tab [Pharmacy Med Name: AMLODIPINE BESYLATE 10 MG Tablet] 90 tablet 1     Sig: TAKE 1 TABLET (10 MG TOTAL) BY MOUTH DAILY. Hypertensive Medications Protocol Passed - 9/22/2021 12:34 AM        Passed - CMP or BMP in past 12 months        Passed - Appointment in past 6 or next 3 months        Passed - GFR Non- > 50     Lab Results   Component Value Date    GFRNAA 57 (L) 08/09/2021                    OMEPRAZOLE 20 MG Oral Capsule Delayed Release [Pharmacy Med Name: OMEPRAZOLE 20 MG Capsule Delayed Release] 90 capsule 0     Sig: TAKE 1 CAPSULE EVERY MORNING BEFORE BREAKFAST        Gastrointestional Medication Protocol Passed - 9/22/2021 12:34 AM        Passed - Appointment in past 12 or next 3 months           IRBESARTAN 300 MG Oral Tab [Pharmacy Med Name: IRBESARTAN 300 MG Tablet] 90 tablet 1     Sig: TAKE 1 TABLET (300 MG TOTAL) BY MOUTH NIGHTLY. Hypertensive Medications Protocol Passed - 9/22/2021 12:34 AM        Passed - CMP or BMP in past 12 months        Passed - Appointment in past 6 or next 3 months        Passed - GFR Non- > 50     Lab Results   Component Value Date    GFRNAA 57 (L) 08/09/2021                    METFORMIN HCL  MG Oral Tablet 24 Hr [Pharmacy Med Name: Reatha Burly  MG Tablet Extended Release 24 Hour] 90 tablet 1     Sig: TAKE 1 TABLET (500 MG TOTAL) BY MOUTH NIGHTLY.         Diabetes Medication Protocol Passed - 9/22/2021 12:34 AM        Passed - Last A1C < 7.5 and within past 6 months     Lab Results   Component Value Date    A1C 6.3 (H) 08/09/2021               Passed - Appointment in past 6 or next 3 months        Passed - GFR Non- > 50     Lab Results   Component Value Date    GFRNAA 57 (L) 08/09/2021                 Passed - GFR in the past 12 months               Future Appointments         Provider Department Appt Notes    In 1 month Lissa [place]den [place] [address] [address] Return in about 1 week (around 9/9/2021), or PI right eye.             Recent Outpatient Visits              2 weeks ago Primary open angle glaucoma (POAG) of both eyes, moderate stage    Ophthalmology - [address], Indy Reyes MD    Office Visit    3 weeks ago Primary open angle glaucoma (POAG) of both eyes, moderate stage    Ophthalmology - [place], Indy Reyes MD    Office Visit    1 month ago Lymphocytic esophagitis    Anais Kimball MD    Office Visit    3 months ago Primary open angle glaucoma (POAG) of both eyes, moderate stage    Ophthalmology - [address], Indy Reyes MD    Office Visit    3 months ago Burning mouth syndrome    [facility] Dermatology Dillon Marcos MD    Office Visit

## 2021-11-09 ENCOUNTER — OFFICE VISIT (OUTPATIENT)
Dept: INTERNAL MEDICINE CLINIC | Facility: CLINIC | Age: 76
End: 2021-11-09
Payer: MEDICARE

## 2021-11-09 VITALS
OXYGEN SATURATION: 99 % | DIASTOLIC BLOOD PRESSURE: 62 MMHG | BODY MASS INDEX: 22.34 KG/M2 | HEART RATE: 70 BPM | TEMPERATURE: 97 F | SYSTOLIC BLOOD PRESSURE: 132 MMHG | WEIGHT: 144 LBS | HEIGHT: 67.32 IN | RESPIRATION RATE: 16 BRPM

## 2021-11-09 DIAGNOSIS — E11.21 TYPE 2 DIABETES MELLITUS WITH DIABETIC NEPHROPATHY, WITHOUT LONG-TERM CURRENT USE OF INSULIN (HCC): ICD-10-CM

## 2021-11-09 DIAGNOSIS — Z23 NEED FOR VACCINATION: Primary | ICD-10-CM

## 2021-11-09 DIAGNOSIS — E11.29 PROTEINURIA DUE TO TYPE 2 DIABETES MELLITUS (HCC): ICD-10-CM

## 2021-11-09 DIAGNOSIS — R80.9 PROTEINURIA DUE TO TYPE 2 DIABETES MELLITUS (HCC): ICD-10-CM

## 2021-11-09 DIAGNOSIS — I10 ESSENTIAL HYPERTENSION: ICD-10-CM

## 2021-11-09 DIAGNOSIS — K20.80 LYMPHOCYTIC ESOPHAGITIS: ICD-10-CM

## 2021-11-09 PROCEDURE — 3078F DIAST BP <80 MM HG: CPT | Performed by: INTERNAL MEDICINE

## 2021-11-09 PROCEDURE — 99204 OFFICE O/P NEW MOD 45 MIN: CPT | Performed by: INTERNAL MEDICINE

## 2021-11-09 PROCEDURE — 3075F SYST BP GE 130 - 139MM HG: CPT | Performed by: INTERNAL MEDICINE

## 2021-11-09 PROCEDURE — 90662 IIV NO PRSV INCREASED AG IM: CPT | Performed by: INTERNAL MEDICINE

## 2021-11-09 PROCEDURE — G0008 ADMIN INFLUENZA VIRUS VAC: HCPCS | Performed by: INTERNAL MEDICINE

## 2021-11-09 PROCEDURE — 3008F BODY MASS INDEX DOCD: CPT | Performed by: INTERNAL MEDICINE

## 2021-11-09 NOTE — PROGRESS NOTES
Lanice Seip  6/23/1945    Patient presents with:  Establish Care: RG rm 9 New pt establish care      SUBJECTIVE   Lanice Seip is a 68year old male who presents to establish care.     The patient has a history of dysphagia for which he has differently: Take 0.5 mg by nebulization daily. Mix 2 ml (0.5 mg) in honey or apple sauce and swallow twice daily. Rinse your mouth after taking and spit.   Do not eat or drink for 30 minutes after use.) 180 each 3   • latanoprost 0.005 % Ophthalmic [place] month    Drug use: No        OBJECTIVE:   /62   Pulse 70   Temp 97.1 °F (36.2 °C)   Resp 16   Ht 5' 7.32\" (1.71 m)   Wt 144 lb (65.3 kg)   SpO2 99%   BMI 22.34 kg/m²   Constitutional: Oriented to person, place, and time. No distress.    HEENT:  Normo file.  There are no Patient Instructions on file for this visit. All questions were answered and the patient agrees with the plan.      Thank you,  Dianna Bloch, MD

## 2021-11-17 PROBLEM — N52.9 ERECTILE DYSFUNCTION: Status: RESOLVED | Noted: 2017-04-24 | Resolved: 2021-11-17

## 2021-11-17 RX ORDER — AMOXICILLIN 500 MG/1
TABLET, FILM COATED ORAL
COMMUNITY
Start: 2021-06-04

## 2021-11-17 RX ORDER — CHLORHEXIDINE GLUCONATE 0.12 MG/ML
RINSE ORAL
COMMUNITY
Start: 2021-07-19

## 2021-11-17 RX ORDER — TRAMADOL HYDROCHLORIDE 50 MG/1
TABLET ORAL
COMMUNITY
Start: 2021-06-04 | End: 2022-01-14

## 2021-12-03 ENCOUNTER — PATIENT MESSAGE (OUTPATIENT)
Dept: INTERNAL MEDICINE CLINIC | Facility: CLINIC | Age: 76
End: 2021-12-03

## 2021-12-08 ENCOUNTER — TELEPHONE (OUTPATIENT)
Dept: INTERNAL MEDICINE CLINIC | Facility: CLINIC | Age: 76
End: 2021-12-08

## 2021-12-08 DIAGNOSIS — Z13.0 SCREENING FOR BLOOD DISEASE: ICD-10-CM

## 2021-12-08 DIAGNOSIS — Z13.29 SCREENING FOR THYROID DISORDER: ICD-10-CM

## 2021-12-08 DIAGNOSIS — Z00.00 ROUTINE GENERAL MEDICAL EXAMINATION AT A HEALTH CARE FACILITY: Primary | ICD-10-CM

## 2021-12-08 NOTE — TELEPHONE ENCOUNTER
cpe   Future Appointments   Date Time Provider Kasey Amin   1/14/2022  1:00 PM Chiquita Delgado MD [facility] [facility]      Orders to THE [facility] aware must fast no call back required

## 2021-12-22 ENCOUNTER — LAB ENCOUNTER (OUTPATIENT)
Dept: LAB | Age: 76
End: 2021-12-22
Attending: INTERNAL MEDICINE
Payer: MEDICARE

## 2021-12-22 DIAGNOSIS — Z13.0 SCREENING FOR BLOOD DISEASE: ICD-10-CM

## 2021-12-22 DIAGNOSIS — Z13.29 SCREENING FOR THYROID DISORDER: ICD-10-CM

## 2021-12-22 DIAGNOSIS — Z00.00 ROUTINE GENERAL MEDICAL EXAMINATION AT A HEALTH CARE FACILITY: ICD-10-CM

## 2021-12-22 PROCEDURE — 85025 COMPLETE CBC W/AUTO DIFF WBC: CPT

## 2021-12-22 PROCEDURE — 84443 ASSAY THYROID STIM HORMONE: CPT

## 2021-12-22 PROCEDURE — 36415 COLL VENOUS BLD VENIPUNCTURE: CPT

## 2022-01-14 ENCOUNTER — OFFICE VISIT (OUTPATIENT)
Dept: INTERNAL MEDICINE CLINIC | Facility: CLINIC | Age: 77
End: 2022-01-14
Payer: MEDICARE

## 2022-01-14 VITALS
TEMPERATURE: 97 F | WEIGHT: 144 LBS | HEART RATE: 62 BPM | DIASTOLIC BLOOD PRESSURE: 76 MMHG | HEIGHT: 67 IN | BODY MASS INDEX: 22.6 KG/M2 | SYSTOLIC BLOOD PRESSURE: 138 MMHG | RESPIRATION RATE: 16 BRPM | OXYGEN SATURATION: 98 %

## 2022-01-14 DIAGNOSIS — K20.80 LYMPHOCYTIC ESOPHAGITIS: ICD-10-CM

## 2022-01-14 DIAGNOSIS — I10 ESSENTIAL HYPERTENSION: ICD-10-CM

## 2022-01-14 DIAGNOSIS — E11.21 TYPE 2 DIABETES MELLITUS WITH DIABETIC NEPHROPATHY, WITHOUT LONG-TERM CURRENT USE OF INSULIN (HCC): ICD-10-CM

## 2022-01-14 DIAGNOSIS — E11.21 DIABETIC NEPHROPATHY ASSOCIATED WITH TYPE 2 DIABETES MELLITUS (HCC): ICD-10-CM

## 2022-01-14 DIAGNOSIS — Z00.00 ENCOUNTER FOR ANNUAL HEALTH EXAMINATION: Primary | ICD-10-CM

## 2022-01-14 PROCEDURE — 3008F BODY MASS INDEX DOCD: CPT | Performed by: INTERNAL MEDICINE

## 2022-01-14 PROCEDURE — 3078F DIAST BP <80 MM HG: CPT | Performed by: INTERNAL MEDICINE

## 2022-01-14 PROCEDURE — G0439 PPPS, SUBSEQ VISIT: HCPCS | Performed by: INTERNAL MEDICINE

## 2022-01-14 PROCEDURE — 99397 PER PM REEVAL EST PAT 65+ YR: CPT | Performed by: INTERNAL MEDICINE

## 2022-01-14 PROCEDURE — 3075F SYST BP GE 130 - 139MM HG: CPT | Performed by: INTERNAL MEDICINE

## 2022-01-14 PROCEDURE — 96160 PT-FOCUSED HLTH RISK ASSMT: CPT | Performed by: INTERNAL MEDICINE

## 2022-01-14 NOTE — PATIENT INSTRUCTIONS
Vinay Marks's SCREENING SCHEDULE   Tests on this list are recommended by your physician but may not be covered, or covered at this frequency, by your insurer. Please check with your insurance carrier before scheduling to verify coverage.    PRE 11/09/2021  No recommendations at this time    Pneumococcal Each vaccine ([id number] & [id number]) covered once after 65 Prevnar 13: 03/08/2016    [id number]: 04/19/2018     No recommendations at this time    Hepatitis B One screening covered for patients Public Health. This site has a lot of good information including definitions of the different types of Advance Directives.  It also has the State forms available on it's website for anyone to review and print using their home computer and printer. (the form

## 2022-01-14 NOTE — PROGRESS NOTES
HPI:   Emily Patel is a 68year old male who presents for a Medicare Subsequent Annual Wellness visit (Pt already had Initial Annual Wellness). The patient has maintained his usual state of health.   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 (ACCU-CHEK SOFTCLIX LANCET DEV) Does not apply Kit,        MEDICAL INFORMATION:   He  has a past medical history of Constipation (micheal), Diabetes ([facility] [address] Diabetic eye exam ([facility] [address] (06/30/202 abnormality, atraumatic   Eyes:  PERRL, conjunctiva/corneas clear, EOM's intact, both eyes   Ears:  Normal TM's and external ear canals, both ears   Nose: Deferred   Throat: Deferred   Neck: Supple, symmetrical, trachea midline, no adenopathy, thyroid: not attempts in the past  UTD with ophthalmologic evaluation     Hypertension:  Stable/controlled  Continue losartan and amlodipine  DASH recommendations reinforced    Tortuous aorta:  Continue BP control: irbesartan and amlodipine  Post recent evaluation by idris signs or symptoms of cardiovascular disease Lab Results   Component Value Date    CHOLEST 188 08/09/2021    HDL 48 08/09/2021     (H) 08/09/2021    TRIG 142 08/09/2021         Electrocardiogram (EKG)   Covered if needed at Welcome to Medicare, and n elevated, may be asked to retest more frequently.     Medicare covers every 3 months Lab Results   Component Value Date     (H) 08/09/2021    A1C 6.3 (H) 08/09/2021       Hemoglobin A1C Test due on 02/09/2022    Creat/alb ratio Annually Lab Results

## 2022-02-08 ENCOUNTER — LAB ENCOUNTER (OUTPATIENT)
Dept: LAB | Age: 77
End: 2022-02-08
Attending: INTERNAL MEDICINE
Payer: MEDICARE

## 2022-02-08 DIAGNOSIS — E11.29 PROTEINURIA DUE TO TYPE 2 DIABETES MELLITUS (HCC): ICD-10-CM

## 2022-02-08 DIAGNOSIS — E11.21 TYPE 2 DIABETES MELLITUS WITH DIABETIC NEPHROPATHY, WITHOUT LONG-TERM CURRENT USE OF INSULIN (HCC): ICD-10-CM

## 2022-02-08 DIAGNOSIS — R80.9 PROTEINURIA DUE TO TYPE 2 DIABETES MELLITUS (HCC): ICD-10-CM

## 2022-02-08 LAB
ALBUMIN SERPL-MCNC: 4.1 G/DL (ref 3.4–5)
ALBUMIN/GLOB SERPL: 1.1 {RATIO} (ref 1–2)
ALP LIVER SERPL-CCNC: 65 U/L
ALT SERPL-CCNC: 29 U/L
ANION GAP SERPL CALC-SCNC: 4 MMOL/L (ref 0–18)
AST SERPL-CCNC: 18 U/L (ref 15–37)
BILIRUB SERPL-MCNC: 0.7 MG/DL (ref 0.1–2)
BUN BLD-MCNC: 19 MG/DL (ref 7–18)
CALCIUM BLD-MCNC: 9.4 MG/DL (ref 8.5–10.1)
CHLORIDE SERPL-SCNC: 104 MMOL/L (ref 98–112)
CHOLEST SERPL-MCNC: 210 MG/DL (ref ?–200)
CO2 SERPL-SCNC: 28 MMOL/L (ref 21–32)
CREAT BLD-MCNC: 1.2 MG/DL
CREAT UR-SCNC: 180 MG/DL
EST. AVERAGE GLUCOSE BLD GHB EST-MCNC: 146 MG/DL (ref 68–126)
FASTING PATIENT LIPID ANSWER: YES
FASTING STATUS PATIENT QL REPORTED: YES
GLOBULIN PLAS-MCNC: 3.8 G/DL (ref 2.8–4.4)
GLUCOSE BLD-MCNC: 138 MG/DL (ref 70–99)
HBA1C MFR BLD: 6.7 % (ref ?–5.7)
HDLC SERPL-MCNC: 48 MG/DL (ref 40–59)
LDLC SERPL CALC-MCNC: 133 MG/DL (ref ?–100)
MICROALBUMIN UR-MCNC: 40 MG/DL
MICROALBUMIN/CREAT 24H UR-RTO: 222.2 UG/MG (ref ?–30)
NONHDLC SERPL-MCNC: 162 MG/DL (ref ?–130)
OSMOLALITY SERPL CALC.SUM OF ELEC: 286 MOSM/KG (ref 275–295)
POTASSIUM SERPL-SCNC: 4.5 MMOL/L (ref 3.5–5.1)
PROT SERPL-MCNC: 7.9 G/DL (ref 6.4–8.2)
SODIUM SERPL-SCNC: 136 MMOL/L (ref 136–145)
TRIGL SERPL-MCNC: 162 MG/DL (ref 30–149)
VLDLC SERPL CALC-MCNC: 30 MG/DL (ref 0–30)

## 2022-02-08 PROCEDURE — 82570 ASSAY OF URINE CREATININE: CPT

## 2022-02-08 PROCEDURE — 36415 COLL VENOUS BLD VENIPUNCTURE: CPT

## 2022-02-08 PROCEDURE — 83036 HEMOGLOBIN GLYCOSYLATED A1C: CPT

## 2022-02-08 PROCEDURE — 80053 COMPREHEN METABOLIC PANEL: CPT

## 2022-02-08 PROCEDURE — 80061 LIPID PANEL: CPT

## 2022-02-08 PROCEDURE — 82043 UR ALBUMIN QUANTITATIVE: CPT

## 2022-02-24 ENCOUNTER — ORDER TRANSCRIPTION (OUTPATIENT)
Dept: ADMINISTRATIVE | Facility: HOSPITAL | Age: 77
End: 2022-02-24

## 2022-02-24 ENCOUNTER — HOSPITAL ENCOUNTER (OUTPATIENT)
Dept: CT IMAGING | Age: 77
Discharge: HOME | End: 2022-02-24
Attending: INTERNAL MEDICINE

## 2022-02-24 DIAGNOSIS — Z13.9 SPECIAL SCREENING: ICD-10-CM

## 2022-04-03 DIAGNOSIS — I77.1 TORTUOUS AORTA (HCC): ICD-10-CM

## 2022-04-03 DIAGNOSIS — I10 ESSENTIAL HYPERTENSION: ICD-10-CM

## 2022-04-04 RX ORDER — METFORMIN HYDROCHLORIDE 500 MG/1
500 TABLET, EXTENDED RELEASE ORAL NIGHTLY
Qty: 90 TABLET | Refills: 1 | Status: SHIPPED | OUTPATIENT
Start: 2022-04-04 | End: 2022-09-07

## 2022-04-04 RX ORDER — IRBESARTAN 300 MG/1
300 TABLET ORAL NIGHTLY
Qty: 90 TABLET | Refills: 1 | Status: SHIPPED | OUTPATIENT
Start: 2022-04-04 | End: 2022-09-07

## 2022-04-04 NOTE — TELEPHONE ENCOUNTER
Refill passed per [employer], [facility] protocol. Requested Prescriptions   Pending Prescriptions Disp Refills    IRBESARTAN 300 MG Oral Tab [Pharmacy Med Name: IRBESARTAN 300 MG Tablet] 90 tablet 1     Sig: TAKE 1 TABLET (300 MG TOTAL) BY MOUTH NIGHTLY. Hypertensive Medications Protocol Passed - 4/3/2022  3:28 PM        Passed - CMP or BMP in past 12 months        Passed - Appointment in past 6 or next 3 months        Passed - GFR Non- > 50     Lab Results   Component Value Date    GFRNAA 58 (L) 02/08/2022                    METFORMIN  MG Oral Tablet 24 Hr [Pharmacy Med Name: METFORMIN HYDROCHLORIDE  MG Tablet Extended Release 24 Hour] 90 tablet 1     Sig: TAKE 1 TABLET (500 MG TOTAL) BY MOUTH NIGHTLY. Diabetes Medication Protocol Passed - 4/3/2022  3:28 PM        Passed - Last A1C < 7.5 and within past 6 months     Lab Results   Component Value Date    A1C 6.7 (H) 02/08/2022               Passed - Appointment in past 6 or next 3 months        Passed - GFR Non- > 50     Lab Results   Component Value Date    GFRNAA 58 (L) 02/08/2022                 Passed - GFR in the past 12 months               Future Appointments         Provider Department Appt Notes    In 1 week Ayan Lewis MD Ophthalmology - [facility] Yahaira Sanchez Return in about 4 months (around 4/9/2022), or YAG PI OS.     In 1 month [facility] RN RADIOLOGY 1 CALCIUM SCORE; [address] [facility]             Recent Outpatient Visits              2 months ago Encounter for annual health examination    Basim Ramirez Amish, MD    Office Visit    3 months ago Narrow angle glaucoma suspect of both eyes    Ophthalmology - [facility] Lakesha Sanchez MD    Office Visit    4 months ago Narrow angle glaucoma suspect of both eyes    Ophthalmology - [facility] Lakesha Sanchez MD    Office Visit    4 months ago Need for vaccination    Jonny Griffith MD    Office Visit    7 months ago Primary open angle glaucoma (POAG) of both eyes, moderate stage    Ophthalmology - Silvana Sanchez, Lakesha Sánchez MD    Office Visit

## 2022-05-04 RX ORDER — PRAVASTATIN SODIUM 10 MG
TABLET ORAL
Qty: 90 TABLET | Refills: 1 | OUTPATIENT
Start: 2022-05-04

## 2022-05-10 ENCOUNTER — TELEPHONE (OUTPATIENT)
Dept: ADMINISTRATIVE | Age: 77
End: 2022-05-10

## 2022-05-10 DIAGNOSIS — L98.9 SKIN DISEASE: Primary | ICD-10-CM

## 2022-05-10 NOTE — TELEPHONE ENCOUNTER
Patient request external Dermatology,Myrtle,Please review and sign plan and care if you agree Thank you. Aiyana Rousseau V      [facility] REFERRALS.

## 2022-05-11 NOTE — TELEPHONE ENCOUNTER
PA listen on referral. [address] sent to pt asking pt to obtain supervising physician for referral along with office information. Hold for response.

## 2022-06-06 DIAGNOSIS — I77.1 TORTUOUS AORTA (HCC): ICD-10-CM

## 2022-06-06 DIAGNOSIS — I10 ESSENTIAL HYPERTENSION: ICD-10-CM

## 2022-06-06 RX ORDER — AMLODIPINE BESYLATE 10 MG/1
TABLET ORAL
Qty: 90 TABLET | Refills: 1 | Status: SHIPPED | OUTPATIENT
Start: 2022-06-06

## 2022-06-07 NOTE — TELEPHONE ENCOUNTER
[employer] message sent to pt, await reply. Refill passed per [facility] protocol   Requested Prescriptions   Pending Prescriptions Disp Refills    AMLODIPINE 10 MG Oral Tab [Pharmacy Med Name: AMLODIPINE BESYLATE 10 MG Tablet] 90 tablet 1     Sig: TAKE 1 TABLET (10 MG TOTAL) BY MOUTH DAILY.         Hypertensive Medications Protocol Passed - 6/6/2022  8:38 PM        Passed - CMP or BMP in past 12 months        Passed - Appointment in past 6 or next 3 months        Passed - GFR Non- > 50     Lab Results   Component Value Date    GFRNAA 58 (L) 02/08/2022                    OMEPRAZOLE 20 MG Oral Capsule Delayed Release [Pharmacy Med Name: OMEPRAZOLE 20 MG Capsule Delayed Release] 90 capsule 1     Sig: TAKE 1 CAPSULE BEFORE BREAKFAST        Gastrointestional Medication Protocol Passed - 6/6/2022  8:38 PM        Passed - Appointment in past 12 or next 3 months

## 2022-06-08 RX ORDER — OMEPRAZOLE 20 MG/1
CAPSULE, DELAYED RELEASE ORAL
Qty: 90 CAPSULE | Refills: 1 | Status: SHIPPED | OUTPATIENT
Start: 2022-06-08

## 2022-06-09 ENCOUNTER — LAB ENCOUNTER (OUTPATIENT)
Dept: LAB | Age: 77
End: 2022-06-09
Attending: INTERNAL MEDICINE
Payer: MEDICARE

## 2022-06-09 DIAGNOSIS — E11.21 TYPE 2 DIABETES MELLITUS WITH DIABETIC NEPHROPATHY, WITHOUT LONG-TERM CURRENT USE OF INSULIN (HCC): ICD-10-CM

## 2022-06-09 LAB
ALBUMIN SERPL-MCNC: 4 G/DL (ref 3.4–5)
ALBUMIN/GLOB SERPL: 1 {RATIO} (ref 1–2)
ALP LIVER SERPL-CCNC: 61 U/L
ALT SERPL-CCNC: 21 U/L
ANION GAP SERPL CALC-SCNC: 6 MMOL/L (ref 0–18)
AST SERPL-CCNC: 21 U/L (ref 15–37)
BILIRUB SERPL-MCNC: 0.8 MG/DL (ref 0.1–2)
BUN BLD-MCNC: 18 MG/DL (ref 7–18)
CALCIUM BLD-MCNC: 9.4 MG/DL (ref 8.5–10.1)
CHLORIDE SERPL-SCNC: 106 MMOL/L (ref 98–112)
CHOLEST SERPL-MCNC: 133 MG/DL (ref ?–200)
CO2 SERPL-SCNC: 26 MMOL/L (ref 21–32)
CREAT BLD-MCNC: 1.13 MG/DL
EST. AVERAGE GLUCOSE BLD GHB EST-MCNC: 148 MG/DL (ref 68–126)
FASTING PATIENT LIPID ANSWER: YES
FASTING STATUS PATIENT QL REPORTED: YES
GLOBULIN PLAS-MCNC: 4 G/DL (ref 2.8–4.4)
GLUCOSE BLD-MCNC: 133 MG/DL (ref 70–99)
HBA1C MFR BLD: 6.8 % (ref ?–5.7)
HDLC SERPL-MCNC: 49 MG/DL (ref 40–59)
LDLC SERPL CALC-MCNC: 67 MG/DL (ref ?–100)
NONHDLC SERPL-MCNC: 84 MG/DL (ref ?–130)
OSMOLALITY SERPL CALC.SUM OF ELEC: 290 MOSM/KG (ref 275–295)
POTASSIUM SERPL-SCNC: 4.4 MMOL/L (ref 3.5–5.1)
PROT SERPL-MCNC: 8 G/DL (ref 6.4–8.2)
SODIUM SERPL-SCNC: 138 MMOL/L (ref 136–145)
TRIGL SERPL-MCNC: 87 MG/DL (ref 30–149)
VLDLC SERPL CALC-MCNC: 13 MG/DL (ref 0–30)

## 2022-06-09 PROCEDURE — 80061 LIPID PANEL: CPT

## 2022-06-09 PROCEDURE — 80053 COMPREHEN METABOLIC PANEL: CPT

## 2022-06-09 PROCEDURE — 36415 COLL VENOUS BLD VENIPUNCTURE: CPT

## 2022-06-09 PROCEDURE — 83036 HEMOGLOBIN GLYCOSYLATED A1C: CPT

## 2022-06-10 RX ORDER — BLOOD SUGAR DIAGNOSTIC
STRIP MISCELLANEOUS
Qty: 200 STRIP | Refills: 0 | OUTPATIENT
Start: 2022-06-10

## 2022-06-11 NOTE — TELEPHONE ENCOUNTER
Duplicate request, previously addressed. Pt is seeing a different doctor, see refill message 5-3-22.

## 2022-07-25 ENCOUNTER — TELEPHONE (OUTPATIENT)
Dept: INTERNAL MEDICINE CLINIC | Facility: CLINIC | Age: 77
End: 2022-07-25

## 2022-07-25 DIAGNOSIS — H57.89 BURNING SENSATION OF EYE: Primary | ICD-10-CM

## 2022-07-25 NOTE — TELEPHONE ENCOUNTER
Specialty:   opthamologist    Full Name of Specialist:  Nikko Viveros    Name of the Provider Group:  [facility]  Address: [address] [place] [address] Dr  Phone number: [phone]  Fax number :    NPI:    (NPI number of the service provider. the nurses need this information for the referral.  Its for service providers only like [employer], [facility], Etc.   We not NOT need physician NPI's)    Date of Appointment:   7/27/22    Reason for the Appointment (be specific with diagnosis code):  Burning in R eye-had laser treatment on this eye last year at same office different MD    Has the patient seen a provider in our office for stated problem?: NO    Is this request for an out of network referral?  NO  (if yes, please have patient contact referring provider and have them fax office visit notes to triage attention):

## 2022-07-26 NOTE — TELEPHONE ENCOUNTER
Message sent to [facility] in [address] Referrals to rush auth.

## 2022-07-27 NOTE — TELEPHONE ENCOUNTER
Pt called stating that the referral can't be viewed by Dr. Zack Charles. Printed and faxed to [phone], confirmation received.

## 2022-07-27 NOTE — TELEPHONE ENCOUNTER
Kiesha Jeffers from [facility] Referrals indicates the referral# [id number] has been authorized. Pt verbalizes understanding.

## 2022-09-06 DIAGNOSIS — I77.1 TORTUOUS AORTA (HCC): ICD-10-CM

## 2022-09-06 DIAGNOSIS — I10 ESSENTIAL HYPERTENSION: ICD-10-CM

## 2022-09-07 RX ORDER — IRBESARTAN 300 MG/1
300 TABLET ORAL NIGHTLY
Qty: 90 TABLET | Refills: 1 | Status: SHIPPED | OUTPATIENT
Start: 2022-09-07 | End: 2023-06-22

## 2022-09-07 RX ORDER — METFORMIN HYDROCHLORIDE 500 MG/1
500 TABLET, EXTENDED RELEASE ORAL NIGHTLY
Qty: 90 TABLET | Refills: 1 | Status: SHIPPED | OUTPATIENT
Start: 2022-09-07 | End: 2023-06-22

## 2022-09-09 ENCOUNTER — TELEPHONE (OUTPATIENT)
Dept: FAMILY MEDICINE CLINIC | Facility: CLINIC | Age: 77
End: 2022-09-09

## 2022-09-15 RX ORDER — PRAVASTATIN SODIUM 10 MG
10 TABLET ORAL NIGHTLY
Qty: 90 TABLET | Refills: 1 | Status: SHIPPED | OUTPATIENT
Start: 2022-09-15

## 2022-09-15 NOTE — TELEPHONE ENCOUNTER
Last VISIT 01/14/22    Last CPE 01/14/22    Last REFILL 08/15/21 qty 90 w/1 refill    Last LABS 06/09/22 CMP, Lipid, A1c done    No Future Appointments      Please Approve or Deny.

## 2022-11-07 ENCOUNTER — OFFICE VISIT (OUTPATIENT)
Dept: INTERNAL MEDICINE CLINIC | Facility: CLINIC | Age: 77
End: 2022-11-07
Payer: MEDICARE

## 2022-11-07 VITALS
RESPIRATION RATE: 18 BRPM | HEIGHT: 67 IN | BODY MASS INDEX: 23.07 KG/M2 | SYSTOLIC BLOOD PRESSURE: 124 MMHG | HEART RATE: 79 BPM | WEIGHT: 147 LBS | OXYGEN SATURATION: 98 % | DIASTOLIC BLOOD PRESSURE: 62 MMHG

## 2022-11-07 DIAGNOSIS — R80.9 TYPE 2 DIABETES MELLITUS WITH MICROALBUMINURIA, WITHOUT LONG-TERM CURRENT USE OF INSULIN (HCC): Primary | ICD-10-CM

## 2022-11-07 DIAGNOSIS — I10 ESSENTIAL HYPERTENSION: ICD-10-CM

## 2022-11-07 DIAGNOSIS — K20.80 LYMPHOCYTIC ESOPHAGITIS: ICD-10-CM

## 2022-11-07 DIAGNOSIS — E11.29 TYPE 2 DIABETES MELLITUS WITH MICROALBUMINURIA, WITHOUT LONG-TERM CURRENT USE OF INSULIN (HCC): Primary | ICD-10-CM

## 2022-11-07 PROCEDURE — 3008F BODY MASS INDEX DOCD: CPT | Performed by: INTERNAL MEDICINE

## 2022-11-07 PROCEDURE — 99214 OFFICE O/P EST MOD 30 MIN: CPT | Performed by: INTERNAL MEDICINE

## 2022-11-07 PROCEDURE — 3078F DIAST BP <80 MM HG: CPT | Performed by: INTERNAL MEDICINE

## 2022-11-07 PROCEDURE — 3074F SYST BP LT 130 MM HG: CPT | Performed by: INTERNAL MEDICINE

## 2022-11-07 PROCEDURE — G0008 ADMIN INFLUENZA VIRUS VAC: HCPCS | Performed by: INTERNAL MEDICINE

## 2022-11-07 PROCEDURE — 90662 IIV NO PRSV INCREASED AG IM: CPT | Performed by: INTERNAL MEDICINE

## 2022-11-09 ENCOUNTER — OFFICE VISIT (OUTPATIENT)
Facility: CLINIC | Age: 77
End: 2022-11-09
Payer: MEDICARE

## 2022-11-09 VITALS
WEIGHT: 147 LBS | HEART RATE: 73 BPM | HEIGHT: 67 IN | DIASTOLIC BLOOD PRESSURE: 79 MMHG | SYSTOLIC BLOOD PRESSURE: 142 MMHG | BODY MASS INDEX: 23.07 KG/M2

## 2022-11-09 DIAGNOSIS — Z79.51 CURRENT CHRONIC USE OF INHALED STEROID: ICD-10-CM

## 2022-11-09 DIAGNOSIS — K20.80 LYMPHOCYTIC ESOPHAGITIS: Primary | ICD-10-CM

## 2022-11-09 PROCEDURE — 99213 OFFICE O/P EST LOW 20 MIN: CPT | Performed by: INTERNAL MEDICINE

## 2022-11-09 PROCEDURE — 3077F SYST BP >= 140 MM HG: CPT | Performed by: INTERNAL MEDICINE

## 2022-11-09 PROCEDURE — 3008F BODY MASS INDEX DOCD: CPT | Performed by: INTERNAL MEDICINE

## 2022-11-09 PROCEDURE — 3078F DIAST BP <80 MM HG: CPT | Performed by: INTERNAL MEDICINE

## 2022-11-09 RX ORDER — BUDESONIDE 0.5 MG/2ML
INHALANT ORAL
Qty: 180 EACH | Refills: 3 | Status: SHIPPED | OUTPATIENT
Start: 2022-11-09

## 2022-11-09 NOTE — PATIENT INSTRUCTIONS
1.  Continue the budesonide daily or every other day depending on your swallowing. 2.  Schedule bone density (DEXA scan). 3.  Follow-up office visit in 1 year or sooner if issues arise.

## 2022-11-22 RX ORDER — BLOOD SUGAR DIAGNOSTIC
STRIP MISCELLANEOUS
Qty: 200 STRIP | Refills: 0 | Status: SHIPPED | OUTPATIENT
Start: 2022-11-22

## 2022-12-06 ENCOUNTER — HOSPITAL ENCOUNTER (OUTPATIENT)
Dept: BONE DENSITY | Age: 77
Discharge: HOME | End: 2022-12-06
Attending: INTERNAL MEDICINE
Payer: MEDICARE

## 2022-12-06 DIAGNOSIS — Z79.51 CURRENT CHRONIC USE OF INHALED STEROID: ICD-10-CM

## 2022-12-06 PROCEDURE — 77080 DXA BONE DENSITY AXIAL: CPT | Performed by: INTERNAL MEDICINE

## 2022-12-09 ENCOUNTER — TELEPHONE (OUTPATIENT)
Dept: INTERNAL MEDICINE CLINIC | Facility: CLINIC | Age: 77
End: 2022-12-09

## 2022-12-09 DIAGNOSIS — Z00.00 ROUTINE GENERAL MEDICAL EXAMINATION AT A HEALTH CARE FACILITY: Primary | ICD-10-CM

## 2022-12-09 DIAGNOSIS — I10 ESSENTIAL HYPERTENSION: ICD-10-CM

## 2022-12-09 DIAGNOSIS — R80.9 TYPE 2 DIABETES MELLITUS WITH MICROALBUMINURIA, WITHOUT LONG-TERM CURRENT USE OF INSULIN (HCC): ICD-10-CM

## 2022-12-09 DIAGNOSIS — E11.29 TYPE 2 DIABETES MELLITUS WITH MICROALBUMINURIA, WITHOUT LONG-TERM CURRENT USE OF INSULIN (HCC): ICD-10-CM

## 2022-12-09 NOTE — TELEPHONE ENCOUNTER
Supervisit   Future Appointments   Date Time Provider Kasey Amin   1/24/2023 10:40 AM Jer Manuel MD [facility]     Informed must fast no call back required.  Orders to   Radha Paz

## 2022-12-23 NOTE — TELEPHONE ENCOUNTER
Bloodwork orders placed to THE [facility] lab for upcoming physical per protocol.

## 2023-01-16 ENCOUNTER — LAB ENCOUNTER (OUTPATIENT)
Dept: LAB | Age: 78
End: 2023-01-16
Attending: INTERNAL MEDICINE
Payer: MEDICARE

## 2023-01-16 DIAGNOSIS — Z00.00 ROUTINE GENERAL MEDICAL EXAMINATION AT A HEALTH CARE FACILITY: ICD-10-CM

## 2023-01-16 DIAGNOSIS — I10 ESSENTIAL HYPERTENSION: ICD-10-CM

## 2023-01-16 DIAGNOSIS — E11.29 TYPE 2 DIABETES MELLITUS WITH MICROALBUMINURIA, WITHOUT LONG-TERM CURRENT USE OF INSULIN (HCC): ICD-10-CM

## 2023-01-16 DIAGNOSIS — R80.9 TYPE 2 DIABETES MELLITUS WITH MICROALBUMINURIA, WITHOUT LONG-TERM CURRENT USE OF INSULIN (HCC): ICD-10-CM

## 2023-01-16 LAB
ALBUMIN SERPL-MCNC: 3.8 G/DL (ref 3.4–5)
ALBUMIN/GLOB SERPL: 1 {RATIO} (ref 1–2)
ALP LIVER SERPL-CCNC: 54 U/L
ALT SERPL-CCNC: 17 U/L
ANION GAP SERPL CALC-SCNC: 5 MMOL/L (ref 0–18)
AST SERPL-CCNC: 14 U/L (ref 15–37)
BASOPHILS # BLD AUTO: 0.02 X10(3) UL (ref 0–0.2)
BASOPHILS NFR BLD AUTO: 0.5 %
BILIRUB SERPL-MCNC: 0.9 MG/DL (ref 0.1–2)
BUN BLD-MCNC: 18 MG/DL (ref 7–18)
CALCIUM BLD-MCNC: 9.2 MG/DL (ref 8.5–10.1)
CHLORIDE SERPL-SCNC: 104 MMOL/L (ref 98–112)
CHOLEST SERPL-MCNC: 180 MG/DL (ref ?–200)
CO2 SERPL-SCNC: 27 MMOL/L (ref 21–32)
CREAT BLD-MCNC: 1.07 MG/DL
EOSINOPHIL # BLD AUTO: 0.29 X10(3) UL (ref 0–0.7)
EOSINOPHIL NFR BLD AUTO: 6.7 %
ERYTHROCYTE [DISTWIDTH] IN BLOOD BY AUTOMATED COUNT: 11.7 %
EST. AVERAGE GLUCOSE BLD GHB EST-MCNC: 140 MG/DL (ref 68–126)
FASTING PATIENT LIPID ANSWER: YES
FASTING STATUS PATIENT QL REPORTED: YES
GFR SERPLBLD BASED ON 1.73 SQ M-ARVRAT: 71 ML/MIN/1.73M2 (ref 60–?)
GLOBULIN PLAS-MCNC: 3.7 G/DL (ref 2.8–4.4)
GLUCOSE BLD-MCNC: 141 MG/DL (ref 70–99)
HBA1C MFR BLD: 6.5 % (ref ?–5.7)
HCT VFR BLD AUTO: 42.5 %
HDLC SERPL-MCNC: 47 MG/DL (ref 40–59)
HGB BLD-MCNC: 14.3 G/DL
IMM GRANULOCYTES # BLD AUTO: 0.01 X10(3) UL (ref 0–1)
IMM GRANULOCYTES NFR BLD: 0.2 %
LDLC SERPL CALC-MCNC: 101 MG/DL (ref ?–100)
LYMPHOCYTES # BLD AUTO: 2.16 X10(3) UL (ref 1–4)
LYMPHOCYTES NFR BLD AUTO: 50.1 %
MCH RBC QN AUTO: 30.9 PG (ref 26–34)
MCHC RBC AUTO-ENTMCNC: 33.6 G/DL (ref 31–37)
MCV RBC AUTO: 91.8 FL
MONOCYTES # BLD AUTO: 0.33 X10(3) UL (ref 0.1–1)
MONOCYTES NFR BLD AUTO: 7.7 %
NEUTROPHILS # BLD AUTO: 1.5 X10 (3) UL (ref 1.5–7.7)
NEUTROPHILS # BLD AUTO: 1.5 X10(3) UL (ref 1.5–7.7)
NEUTROPHILS NFR BLD AUTO: 34.8 %
NONHDLC SERPL-MCNC: 133 MG/DL (ref ?–130)
OSMOLALITY SERPL CALC.SUM OF ELEC: 286 MOSM/KG (ref 275–295)
PLATELET # BLD AUTO: 217 10(3)UL (ref 150–450)
POTASSIUM SERPL-SCNC: 4.2 MMOL/L (ref 3.5–5.1)
PROT SERPL-MCNC: 7.5 G/DL (ref 6.4–8.2)
RBC # BLD AUTO: 4.63 X10(6)UL
SODIUM SERPL-SCNC: 136 MMOL/L (ref 136–145)
TRIGL SERPL-MCNC: 187 MG/DL (ref 30–149)
TSI SER-ACNC: 1.43 MIU/ML (ref 0.36–3.74)
VLDLC SERPL CALC-MCNC: 31 MG/DL (ref 0–30)
WBC # BLD AUTO: 4.3 X10(3) UL (ref 4–11)

## 2023-01-16 PROCEDURE — 83036 HEMOGLOBIN GLYCOSYLATED A1C: CPT

## 2023-01-16 PROCEDURE — 84443 ASSAY THYROID STIM HORMONE: CPT

## 2023-01-16 PROCEDURE — 36415 COLL VENOUS BLD VENIPUNCTURE: CPT

## 2023-01-16 PROCEDURE — 85025 COMPLETE CBC W/AUTO DIFF WBC: CPT

## 2023-01-16 PROCEDURE — 80061 LIPID PANEL: CPT

## 2023-01-16 PROCEDURE — 80053 COMPREHEN METABOLIC PANEL: CPT

## 2023-01-24 ENCOUNTER — OFFICE VISIT (OUTPATIENT)
Dept: INTERNAL MEDICINE CLINIC | Facility: CLINIC | Age: 78
End: 2023-01-24
Payer: MEDICARE

## 2023-01-24 VITALS
WEIGHT: 146.88 LBS | RESPIRATION RATE: 16 BRPM | OXYGEN SATURATION: 97 % | SYSTOLIC BLOOD PRESSURE: 120 MMHG | HEART RATE: 70 BPM | TEMPERATURE: 97 F | DIASTOLIC BLOOD PRESSURE: 70 MMHG | BODY MASS INDEX: 23.05 KG/M2 | HEIGHT: 67 IN

## 2023-01-24 DIAGNOSIS — K64.4 EXTERNAL HEMORRHOID: ICD-10-CM

## 2023-01-24 DIAGNOSIS — R93.1 AGATSTON CAC SCORE 100-199: ICD-10-CM

## 2023-01-24 DIAGNOSIS — E11.21 DIABETIC NEPHROPATHY ASSOCIATED WITH TYPE 2 DIABETES MELLITUS (HCC): ICD-10-CM

## 2023-01-24 DIAGNOSIS — M85.89 OSTEOPENIA OF MULTIPLE SITES: ICD-10-CM

## 2023-01-24 DIAGNOSIS — I77.1 TORTUOUS AORTA (HCC): ICD-10-CM

## 2023-01-24 DIAGNOSIS — I10 ESSENTIAL HYPERTENSION: ICD-10-CM

## 2023-01-24 DIAGNOSIS — Z00.00 ENCOUNTER FOR ANNUAL HEALTH EXAMINATION: Primary | ICD-10-CM

## 2023-01-24 DIAGNOSIS — K20.80 LYMPHOCYTIC ESOPHAGITIS: ICD-10-CM

## 2023-01-24 DIAGNOSIS — E11.21 TYPE 2 DIABETES MELLITUS WITH DIABETIC NEPHROPATHY, WITHOUT LONG-TERM CURRENT USE OF INSULIN (HCC): ICD-10-CM

## 2023-01-24 RX ORDER — HYDROCORTISONE 25 MG/G
1 CREAM TOPICAL 2 TIMES DAILY PRN
Qty: 28 G | Refills: 3 | Status: SHIPPED | OUTPATIENT
Start: 2023-01-24

## 2023-03-21 DIAGNOSIS — I10 ESSENTIAL HYPERTENSION: ICD-10-CM

## 2023-03-21 DIAGNOSIS — I77.1 TORTUOUS AORTA (HCC): ICD-10-CM

## 2023-03-22 RX ORDER — AMLODIPINE BESYLATE 10 MG/1
10 TABLET ORAL DAILY
Qty: 90 TABLET | Refills: 1 | OUTPATIENT
Start: 2023-03-22

## 2023-04-21 DIAGNOSIS — I77.1 TORTUOUS AORTA (HCC): ICD-10-CM

## 2023-04-21 DIAGNOSIS — I10 ESSENTIAL HYPERTENSION: ICD-10-CM

## 2023-04-21 RX ORDER — AMLODIPINE BESYLATE 10 MG/1
TABLET ORAL
Qty: 90 TABLET | Refills: 1 | OUTPATIENT
Start: 2023-04-21

## 2023-04-21 RX ORDER — IRBESARTAN 300 MG/1
300 TABLET ORAL NIGHTLY
Qty: 90 TABLET | Refills: 1 | OUTPATIENT
Start: 2023-04-21

## 2023-04-21 NOTE — TELEPHONE ENCOUNTER
Patient has new PCP . [employer] message sent. Refill Request denied.        Future Appointments   Date Time Provider [facility] Eloisa   4/27/2023  8:20 AM Coby Dubose MD [facility]

## 2023-04-27 ENCOUNTER — OFFICE VISIT (OUTPATIENT)
Dept: INTERNAL MEDICINE CLINIC | Facility: CLINIC | Age: 78
End: 2023-04-27
Payer: MEDICARE

## 2023-04-27 ENCOUNTER — LAB ENCOUNTER (OUTPATIENT)
Dept: LAB | Age: 78
End: 2023-04-27
Attending: INTERNAL MEDICINE
Payer: MEDICARE

## 2023-04-27 VITALS
BODY MASS INDEX: 23 KG/M2 | SYSTOLIC BLOOD PRESSURE: 124 MMHG | WEIGHT: 144 LBS | TEMPERATURE: 97 F | DIASTOLIC BLOOD PRESSURE: 82 MMHG | OXYGEN SATURATION: 99 % | HEART RATE: 53 BPM

## 2023-04-27 DIAGNOSIS — R53.83 FATIGUE, UNSPECIFIED TYPE: Primary | ICD-10-CM

## 2023-04-27 DIAGNOSIS — E11.29 TYPE 2 DIABETES MELLITUS WITH MICROALBUMINURIA, WITHOUT LONG-TERM CURRENT USE OF INSULIN (HCC): ICD-10-CM

## 2023-04-27 DIAGNOSIS — R80.9 TYPE 2 DIABETES MELLITUS WITH MICROALBUMINURIA, WITHOUT LONG-TERM CURRENT USE OF INSULIN (HCC): ICD-10-CM

## 2023-04-27 DIAGNOSIS — R53.83 FATIGUE, UNSPECIFIED TYPE: ICD-10-CM

## 2023-04-27 DIAGNOSIS — I10 ESSENTIAL HYPERTENSION: ICD-10-CM

## 2023-04-27 DIAGNOSIS — R93.1 AGATSTON CAC SCORE 100-199: ICD-10-CM

## 2023-04-27 DIAGNOSIS — S49.91XA INJURY OF RIGHT SHOULDER, INITIAL ENCOUNTER: ICD-10-CM

## 2023-04-27 LAB
ALBUMIN SERPL-MCNC: 4.2 G/DL (ref 3.4–5)
ALBUMIN/GLOB SERPL: 1.1 {RATIO} (ref 1–2)
ALP LIVER SERPL-CCNC: 62 U/L
ALT SERPL-CCNC: 25 U/L
ANION GAP SERPL CALC-SCNC: 1 MMOL/L (ref 0–18)
AST SERPL-CCNC: 16 U/L (ref 15–37)
BASOPHILS # BLD AUTO: 0.03 X10(3) UL (ref 0–0.2)
BASOPHILS NFR BLD AUTO: 0.8 %
BILIRUB SERPL-MCNC: 0.8 MG/DL (ref 0.1–2)
BILIRUB UR QL STRIP.AUTO: NEGATIVE
BUN BLD-MCNC: 17 MG/DL (ref 7–18)
CALCIUM BLD-MCNC: 9.4 MG/DL (ref 8.5–10.1)
CHLORIDE SERPL-SCNC: 106 MMOL/L (ref 98–112)
CHOLEST SERPL-MCNC: 211 MG/DL (ref ?–200)
CLARITY UR REFRACT.AUTO: CLEAR
CO2 SERPL-SCNC: 28 MMOL/L (ref 21–32)
COLOR UR AUTO: YELLOW
CREAT BLD-MCNC: 0.98 MG/DL
CREAT UR-SCNC: 158 MG/DL
CRP SERPL-MCNC: <0.29 MG/DL (ref ?–0.3)
EOSINOPHIL # BLD AUTO: 0.18 X10(3) UL (ref 0–0.7)
EOSINOPHIL NFR BLD AUTO: 4.5 %
ERYTHROCYTE [DISTWIDTH] IN BLOOD BY AUTOMATED COUNT: 11.8 %
EST. AVERAGE GLUCOSE BLD GHB EST-MCNC: 140 MG/DL (ref 68–126)
FASTING PATIENT LIPID ANSWER: YES
FASTING STATUS PATIENT QL REPORTED: YES
GFR SERPLBLD BASED ON 1.73 SQ M-ARVRAT: 79 ML/MIN/1.73M2 (ref 60–?)
GLOBULIN PLAS-MCNC: 3.9 G/DL (ref 2.8–4.4)
GLUCOSE BLD-MCNC: 138 MG/DL (ref 70–99)
GLUCOSE UR STRIP.AUTO-MCNC: NEGATIVE MG/DL
HBA1C MFR BLD: 6.5 % (ref ?–5.7)
HCT VFR BLD AUTO: 44.3 %
HDLC SERPL-MCNC: 47 MG/DL (ref 40–59)
HGB BLD-MCNC: 15 G/DL
IMM GRANULOCYTES # BLD AUTO: 0 X10(3) UL (ref 0–1)
IMM GRANULOCYTES NFR BLD: 0 %
KETONES UR STRIP.AUTO-MCNC: NEGATIVE MG/DL
LDLC SERPL CALC-MCNC: 130 MG/DL (ref ?–100)
LEUKOCYTE ESTERASE UR QL STRIP.AUTO: NEGATIVE
LYMPHOCYTES # BLD AUTO: 1.8 X10(3) UL (ref 1–4)
LYMPHOCYTES NFR BLD AUTO: 45.2 %
MCH RBC QN AUTO: 30.5 PG (ref 26–34)
MCHC RBC AUTO-ENTMCNC: 33.9 G/DL (ref 31–37)
MCV RBC AUTO: 90.2 FL
MICROALBUMIN UR-MCNC: 67.3 MG/DL
MICROALBUMIN/CREAT 24H UR-RTO: 425.9 UG/MG (ref ?–30)
MONOCYTES # BLD AUTO: 0.35 X10(3) UL (ref 0.1–1)
MONOCYTES NFR BLD AUTO: 8.8 %
NEUTROPHILS # BLD AUTO: 1.62 X10 (3) UL (ref 1.5–7.7)
NEUTROPHILS # BLD AUTO: 1.62 X10(3) UL (ref 1.5–7.7)
NEUTROPHILS NFR BLD AUTO: 40.7 %
NITRITE UR QL STRIP.AUTO: NEGATIVE
NONHDLC SERPL-MCNC: 164 MG/DL (ref ?–130)
OSMOLALITY SERPL CALC.SUM OF ELEC: 284 MOSM/KG (ref 275–295)
PH UR STRIP.AUTO: 5 [PH] (ref 5–8)
PLATELET # BLD AUTO: 243 10(3)UL (ref 150–450)
POTASSIUM SERPL-SCNC: 4.6 MMOL/L (ref 3.5–5.1)
PROT SERPL-MCNC: 8.1 G/DL (ref 6.4–8.2)
PROT UR STRIP.AUTO-MCNC: 100 MG/DL
RBC # BLD AUTO: 4.91 X10(6)UL
RBC UR QL AUTO: NEGATIVE
SODIUM SERPL-SCNC: 135 MMOL/L (ref 136–145)
SP GR UR STRIP.AUTO: 1.02 (ref 1–1.03)
TRIGL SERPL-MCNC: 189 MG/DL (ref 30–149)
TSI SER-ACNC: 2.14 MIU/ML (ref 0.36–3.74)
UROBILINOGEN UR STRIP.AUTO-MCNC: <2 MG/DL
VLDLC SERPL CALC-MCNC: 34 MG/DL (ref 0–30)
WBC # BLD AUTO: 4 X10(3) UL (ref 4–11)

## 2023-04-27 PROCEDURE — 36415 COLL VENOUS BLD VENIPUNCTURE: CPT

## 2023-04-27 PROCEDURE — 86140 C-REACTIVE PROTEIN: CPT

## 2023-04-27 PROCEDURE — 83036 HEMOGLOBIN GLYCOSYLATED A1C: CPT

## 2023-04-27 PROCEDURE — 99214 OFFICE O/P EST MOD 30 MIN: CPT | Performed by: INTERNAL MEDICINE

## 2023-04-27 PROCEDURE — 80061 LIPID PANEL: CPT

## 2023-04-27 PROCEDURE — 80053 COMPREHEN METABOLIC PANEL: CPT

## 2023-04-27 PROCEDURE — 85025 COMPLETE CBC W/AUTO DIFF WBC: CPT

## 2023-04-27 PROCEDURE — 82570 ASSAY OF URINE CREATININE: CPT

## 2023-04-27 PROCEDURE — 3079F DIAST BP 80-89 MM HG: CPT | Performed by: INTERNAL MEDICINE

## 2023-04-27 PROCEDURE — 81001 URINALYSIS AUTO W/SCOPE: CPT

## 2023-04-27 PROCEDURE — 82043 UR ALBUMIN QUANTITATIVE: CPT

## 2023-04-27 PROCEDURE — 84443 ASSAY THYROID STIM HORMONE: CPT

## 2023-04-27 PROCEDURE — 3074F SYST BP LT 130 MM HG: CPT | Performed by: INTERNAL MEDICINE

## 2023-04-28 DIAGNOSIS — E11.29 TYPE 2 DIABETES MELLITUS WITH MICROALBUMINURIA, WITHOUT LONG-TERM CURRENT USE OF INSULIN (HCC): Primary | ICD-10-CM

## 2023-04-28 DIAGNOSIS — R80.9 TYPE 2 DIABETES MELLITUS WITH MICROALBUMINURIA, WITHOUT LONG-TERM CURRENT USE OF INSULIN (HCC): Primary | ICD-10-CM

## 2023-06-22 DIAGNOSIS — I77.1 TORTUOUS AORTA (HCC): ICD-10-CM

## 2023-06-22 DIAGNOSIS — I10 ESSENTIAL HYPERTENSION: ICD-10-CM

## 2023-06-22 RX ORDER — AMLODIPINE BESYLATE 10 MG/1
10 TABLET ORAL DAILY
Qty: 90 TABLET | Refills: 1 | Status: SHIPPED | OUTPATIENT
Start: 2023-06-22

## 2023-06-22 RX ORDER — OMEPRAZOLE 20 MG/1
20 CAPSULE, DELAYED RELEASE ORAL
Qty: 90 CAPSULE | Refills: 1 | Status: SHIPPED | OUTPATIENT
Start: 2023-06-22

## 2023-06-22 RX ORDER — PRAVASTATIN SODIUM 10 MG
10 TABLET ORAL NIGHTLY
Qty: 90 TABLET | Refills: 1 | Status: SHIPPED | OUTPATIENT
Start: 2023-06-22

## 2023-06-22 RX ORDER — METFORMIN HYDROCHLORIDE 500 MG/1
500 TABLET, EXTENDED RELEASE ORAL NIGHTLY
Qty: 90 TABLET | Refills: 1 | Status: SHIPPED | OUTPATIENT
Start: 2023-06-22

## 2023-06-22 RX ORDER — IRBESARTAN 300 MG/1
300 TABLET ORAL NIGHTLY
Qty: 90 TABLET | Refills: 1 | Status: SHIPPED | OUTPATIENT
Start: 2023-06-22

## 2023-06-22 NOTE — TELEPHONE ENCOUNTER
Last visit 4/27/23 - no follow up visit timeframe indicated     Multiple medications for refill  - pended all for 6 months, please advise I appropriate or when follow up should take place.      Last annual Jan 2023    Seems patient is due for follow up labs in April

## 2023-08-23 RX ORDER — BLOOD SUGAR DIAGNOSTIC
STRIP MISCELLANEOUS
Qty: 200 STRIP | Refills: 0 | Status: SHIPPED | OUTPATIENT
Start: 2023-08-23

## 2023-09-05 ENCOUNTER — OFFICE VISIT (OUTPATIENT)
Dept: INTERNAL MEDICINE CLINIC | Facility: CLINIC | Age: 78
End: 2023-09-05
Payer: MEDICARE

## 2023-09-05 VITALS
DIASTOLIC BLOOD PRESSURE: 64 MMHG | HEIGHT: 67 IN | TEMPERATURE: 97 F | SYSTOLIC BLOOD PRESSURE: 132 MMHG | HEART RATE: 62 BPM | WEIGHT: 142 LBS | BODY MASS INDEX: 22.29 KG/M2

## 2023-09-05 DIAGNOSIS — L98.9 NON-HEALING SKIN LESION: Primary | ICD-10-CM

## 2023-09-05 PROCEDURE — 99213 OFFICE O/P EST LOW 20 MIN: CPT | Performed by: PHYSICIAN ASSISTANT

## 2023-09-05 PROCEDURE — 3008F BODY MASS INDEX DOCD: CPT | Performed by: PHYSICIAN ASSISTANT

## 2023-09-05 PROCEDURE — 3078F DIAST BP <80 MM HG: CPT | Performed by: PHYSICIAN ASSISTANT

## 2023-09-05 PROCEDURE — 1159F MED LIST DOCD IN RCRD: CPT | Performed by: PHYSICIAN ASSISTANT

## 2023-09-05 PROCEDURE — 1160F RVW MEDS BY RX/DR IN RCRD: CPT | Performed by: PHYSICIAN ASSISTANT

## 2023-09-05 PROCEDURE — 3075F SYST BP GE 130 - 139MM HG: CPT | Performed by: PHYSICIAN ASSISTANT

## 2023-09-23 NOTE — PROGRESS NOTES
Subjective:   Patient ID: Vikas gA is a 66year old male. HPI  The patient returns in follow-up today along with his wife. He was last seen in November 2022. As per previous notes the patient developed dysphagia to medications and food for the first time in June 2020. He saw Dr. Mike Herring. An esophagram revealed a cervical esophageal web. Upper endoscopy in July 20 confirmed the cervical esophageal web. This was dilated with the endoscope and a 36 Nepali Savary dilator. The patient felt well the day of the procedure, however, he had pain the following day in his right neck/upper chest which resolved within 1 day. He had no associated fever. The patient noted transient improvement in dysphagia following the initial endoscopy and dilatation which recurred after a few weeks. 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 Repeat upper endoscopy in April 2021 revealed recurrent esophageal narrowing in the cervical esophagus with at least 1 cervical esophageal web. The endoscope would not pass through this area. Savary dilatation was performed to 10 mm (30 [place]). Biopsies on this occasion revealed lymphocytic esophagitis. Post dilatation pain again developed. The patient was placed on 1 mg twice daily of budesonide (respules). He had a significant improvement to the budesonide with absolutely no dysphagia. The patient's budesonide was tapered between May and August to 0.5 mg twice daily and subsequently 0.5 mg once daily. He had a recurrence of dysphagia on the once daily dose and therapy was increased back to 0.5 mg twice daily. The patient was advised to contact us if the dysphagia did not improve. At the time of the patient's last visit he was relatively asymptomatic on 0.5 mg of budesonide every other day. He was advised to continue this budesonide dose for maintenance. A DEXA scan revealed osteopenia.   Calcium and vitamin D supplementation along with primary care follow-up were advised. I also recommended and discussed various modalities for colorectal cancer screening. The patient was to contemplate. Current history:  The patient initially stated that he was having symptoms \"every once in a while\", however, upon further questioning the patient will experience frequent (at least a few times weekly) symptoms if he is \"not careful\". He will have difficulty swallowing spicy foods, larger foods and some of his medications. He typically crushes his metformin and some of his blood pressure pills. Omeprazole will sometimes get stuck. The patient is taking the budesonide every day or every other day. If he omits the medication for a longer period of time symptoms increase. The patient's appetite is good. His weight is reasonably stable. He denies chest pain or heartburn. He has no abdominal pain. He has slight tendency towards constipation if he does not maintain good fiber intake. He has noted no bleeding. The patient's only other complaint is that of nocturia once nightly. History/Other:   Review of Systems  See above    Wt Readings from Last 7 Encounters:  09/25/23 : 145 lb (65.8 kg)  09/05/23 : 142 lb (64.4 kg)  04/27/23 : 144 lb (65.3 kg)  01/24/23 : 146 lb 14.4 oz (66.6 kg)  11/09/22 : 147 lb (66.7 kg)  11/07/22 : 147 lb (66.7 kg)  01/14/22 : 144 lb (65.3 kg)      Current Outpatient Medications   Medication Sig Dispense Refill    omeprazole 20 MG Oral Capsule Delayed Release Take 1 capsule (20 mg total) by mouth before breakfast. 90 capsule 3    budesonide 0.5 MG/2ML Inhalation Suspension Mix 2 ml (0.5 mg) in honey or apple sauce and swallow twice daily. Rinse your mouth after taking and spit. Do not eat or drink for 30 minutes after use.  180 each 3    Glucose Blood (ACCU-CHEK PRATIMA PLUS) In Vitro Strip CHECK BLOOD GLUCOSE TWO TIMES DAILY 200 strip 0    pravastatin 10 MG Oral Tab Take 1 tablet (10 mg total) by mouth nightly. 90 tablet 1    metFORMIN  MG Oral Tablet 24 Hr Take 1 tablet (500 mg total) by mouth nightly. 90 tablet 1    Irbesartan 300 MG Oral Tab Take 1 tablet (300 mg total) by mouth nightly. 90 tablet 1    amLODIPine 10 MG Oral Tab Take 1 tablet (10 mg total) by mouth daily. 90 tablet 1    hydrocortisone (ANUSOL-HC) 2.5 % External Cream Place 1 each rectally 2 (two) times daily as needed for Hemorrhoids. 28 g 3    empagliflozin (JARDIANCE) 10 MG Oral Tab Take 1 tablet (10 mg total) by mouth daily. 90 tablet 0    LATANOPROST 0.005 % Ophthalmic Solution INSTILL 1 DROP IN BOTH EYES EVERY NIGHT 5 mL 0    Blood Glucose Calibration (ACCU-CHEK PRATIMA) In Vitro Solution       Accu-Chek Softclix Lancets Does not apply Misc       Lancets Misc. (ACCU-CHEK SOFTCLIX LANCET DEV) Does not apply Kit        Allergies:  Statins                 PAIN    Comment:Leg pain  Codeine                 UNKNOWN    Objective:   Physical Exam  Vitals and nursing note reviewed. Constitutional:       General: He is not in acute distress. Appearance: He is well-developed. He is not ill-appearing, toxic-appearing or diaphoretic. HENT:      Mouth/Throat:      Pharynx: No oropharyngeal exudate. Comments: No thrush  Eyes:      General: No scleral icterus. Conjunctiva/sclera: Conjunctivae normal.   Neck:      Thyroid: No thyromegaly. Cardiovascular:      Rate and Rhythm: Normal rate and regular rhythm. Heart sounds: Normal heart sounds. No murmur heard. Pulmonary:      Effort: Pulmonary effort is normal. No respiratory distress. Breath sounds: Normal breath sounds. No wheezing or rales. Abdominal:      General: Bowel sounds are normal. There is no distension. Palpations: Abdomen is soft. There is no mass. Tenderness: There is no abdominal tenderness. There is no guarding or rebound. Musculoskeletal:      Cervical back: Neck supple. Lymphadenopathy:      Cervical: No cervical adenopathy. Neurological:      Mental Status: He is alert and oriented to person, place, and time. Psychiatric:         Behavior: Behavior normal.       Component      Latest Ref Rng 4/27/2023   WBC      4.0 - 11.0 x10(3) uL 4.0    RBC      3.80 - 5.80 x10(6)uL 4.91    Hemoglobin      13.0 - 17.5 g/dL 15.0    Hematocrit      39.0 - 53.0 % 44.3    Platelet Count      306.9 - 450.0 10(3)uL 243.0    MCV      80.0 - 100.0 fL 90.2    MCH      26.0 - 34.0 pg 30.5    MCHC      31.0 - 37.0 g/dL 33.9    RDW      % 11.8    Prelim Neutrophil Abs      1.50 - 7.70 x10 (3) uL 1.62    Neutrophils Absolute      1.50 - 7.70 x10(3) uL 1.62    Lymphocytes Absolute      1.00 - 4.00 x10(3) uL 1.80    Monocytes Absolute      0.10 - 1.00 x10(3) uL 0.35    Eosinophils Absolute      0.00 - 0.70 x10(3) uL 0.18    Basophils Absolute      0.00 - 0.20 x10(3) uL 0.03    Immature Granulocyte Absolute      0.00 - 1.00 x10(3) uL 0.00    Neutrophils %      % 40.7    Lymphocytes %      % 45.2    Monocytes %      % 8.8    Eosinophils %      % 4.5    Basophils %      % 0.8    Immature Granulocyte %      % 0.0    TSH      0.358 - 3.740 mIU/mL 2.140    C-REACTIVE PROTEIN      <0.30 mg/dL <0.29        Assessment & Plan:   Lymphocytic esophagitis  (primary encounter diagnosis)  The patient has well-documented lymphocytic colitis of the proximal esophagus with luminal narrowing (web/strictures). He has responded to budesonide, however, remains symptomatic. His budesonide dose is low. I am recommending that he increase the budesonide to 0.5 mg twice daily. He will continue the omeprazole. He will contact me in a few weeks time with a symptom update. The dose could be increased further to 1 mg twice daily if needed. We also discussed the option of a tertiary care evaluation if the patient wishes or remains symptomatic. We all agree to avoid dilatation if possible as the patient has had significant postdilatation pain after his previous 2 dilatation sessions. The patient will otherwise follow-up in the office in 6 months time or sooner if issues arise. Meds This Visit:  Requested Prescriptions     Signed Prescriptions Disp Refills    omeprazole 20 MG Oral Capsule Delayed Release 90 capsule 3     Sig: Take 1 capsule (20 mg total) by mouth before breakfast.    budesonide 0.5 MG/2ML Inhalation Suspension 180 each 3     Sig: Mix 2 ml (0.5 mg) in honey or apple sauce and swallow twice daily. Rinse your mouth after taking and spit. Do not eat or drink for 30 minutes after use.        Imaging & Referrals:  None

## 2023-09-25 ENCOUNTER — OFFICE VISIT (OUTPATIENT)
Facility: CLINIC | Age: 78
End: 2023-09-25

## 2023-09-25 VITALS
WEIGHT: 145 LBS | DIASTOLIC BLOOD PRESSURE: 72 MMHG | SYSTOLIC BLOOD PRESSURE: 132 MMHG | HEIGHT: 67 IN | HEART RATE: 65 BPM | BODY MASS INDEX: 22.76 KG/M2

## 2023-09-25 DIAGNOSIS — K20.80 LYMPHOCYTIC ESOPHAGITIS: Primary | ICD-10-CM

## 2023-09-25 PROCEDURE — 3008F BODY MASS INDEX DOCD: CPT | Performed by: INTERNAL MEDICINE

## 2023-09-25 PROCEDURE — 1159F MED LIST DOCD IN RCRD: CPT | Performed by: INTERNAL MEDICINE

## 2023-09-25 PROCEDURE — 3078F DIAST BP <80 MM HG: CPT | Performed by: INTERNAL MEDICINE

## 2023-09-25 PROCEDURE — 99213 OFFICE O/P EST LOW 20 MIN: CPT | Performed by: INTERNAL MEDICINE

## 2023-09-25 PROCEDURE — 3075F SYST BP GE 130 - 139MM HG: CPT | Performed by: INTERNAL MEDICINE

## 2023-09-25 PROCEDURE — 1126F AMNT PAIN NOTED NONE PRSNT: CPT | Performed by: INTERNAL MEDICINE

## 2023-09-25 RX ORDER — BUDESONIDE 0.5 MG/2ML
INHALANT ORAL
Qty: 180 EACH | Refills: 3 | Status: SHIPPED | OUTPATIENT
Start: 2023-09-25

## 2023-09-25 RX ORDER — OMEPRAZOLE 20 MG/1
20 CAPSULE, DELAYED RELEASE ORAL
Qty: 90 CAPSULE | Refills: 3 | Status: SHIPPED | OUTPATIENT
Start: 2023-09-25

## 2023-09-25 NOTE — PATIENT INSTRUCTIONS
1.  Continue omeprazole daily. 2.  Increase swallowed budesonide to twice daily. 3.  Please contact me with a symptom update in a few weeks. 4.  Follow-up office visit in 6 months.

## 2023-10-17 ENCOUNTER — LAB ENCOUNTER (OUTPATIENT)
Dept: LAB | Age: 78
End: 2023-10-17
Attending: INTERNAL MEDICINE
Payer: MEDICARE

## 2023-10-17 DIAGNOSIS — E11.29 TYPE 2 DIABETES MELLITUS WITH MICROALBUMINURIA, WITHOUT LONG-TERM CURRENT USE OF INSULIN: ICD-10-CM

## 2023-10-17 DIAGNOSIS — R80.9 TYPE 2 DIABETES MELLITUS WITH MICROALBUMINURIA, WITHOUT LONG-TERM CURRENT USE OF INSULIN: ICD-10-CM

## 2023-10-17 LAB
CREAT UR-SCNC: 144 MG/DL
MICROALBUMIN UR-MCNC: 22.1 MG/DL
MICROALBUMIN/CREAT 24H UR-RTO: 153.5 UG/MG (ref ?–30)

## 2023-10-17 PROCEDURE — 82570 ASSAY OF URINE CREATININE: CPT

## 2023-10-17 PROCEDURE — 82043 UR ALBUMIN QUANTITATIVE: CPT

## 2023-10-18 LAB — HGBA1C: 6.9 %

## 2023-11-03 DIAGNOSIS — I77.1 TORTUOUS AORTA (HCC): ICD-10-CM

## 2023-11-03 DIAGNOSIS — I10 ESSENTIAL HYPERTENSION: ICD-10-CM

## 2023-11-06 RX ORDER — OMEPRAZOLE 20 MG/1
20 CAPSULE, DELAYED RELEASE ORAL
Qty: 90 CAPSULE | Refills: 3 | Status: SHIPPED | OUTPATIENT
Start: 2023-11-06

## 2023-11-06 RX ORDER — AMLODIPINE BESYLATE 10 MG/1
10 TABLET ORAL DAILY
Qty: 90 TABLET | Refills: 1 | OUTPATIENT
Start: 2023-11-06

## 2023-11-06 RX ORDER — IRBESARTAN 300 MG/1
300 TABLET ORAL NIGHTLY
Qty: 90 TABLET | Refills: 1 | OUTPATIENT
Start: 2023-11-06

## 2023-11-06 RX ORDER — METFORMIN HYDROCHLORIDE 500 MG/1
500 TABLET, EXTENDED RELEASE ORAL NIGHTLY
Qty: 90 TABLET | Refills: 1 | OUTPATIENT
Start: 2023-11-06

## 2023-11-06 NOTE — TELEPHONE ENCOUNTER
Requested Prescriptions     Pending Prescriptions Disp Refills    omeprazole 20 MG Oral Capsule Delayed Release 90 capsule 3     Sig: Take 1 capsule (20 mg total) by mouth before breakfast.     LOV: 09/25/2023  Last Refill: 09/25/2023

## 2023-11-06 NOTE — TELEPHONE ENCOUNTER
Last Refill? Medication Quantity Refills Start End   amLODIPine 10 MG Oral Tab 90 tablet 1 6/22/2023    Sig:   Take 1 tablet (10 mg total) by mouth daily. Medication Quantity Refills Start End   Irbesartan 300 MG Oral Tab 90 tablet 1 6/22/2023    Sig:   Take 1 tablet (300 mg total) by mouth nightly. Medication Quantity Refills Start End   metFORMIN  MG Oral Tablet 24 Hr 90 tablet 1 6/22/2023    Sig:   Take 1 tablet (500 mg total) by mouth nightly.        Rx denied- Refill not due

## 2023-11-13 ENCOUNTER — TELEPHONE (OUTPATIENT)
Dept: INTERNAL MEDICINE CLINIC | Facility: CLINIC | Age: 78
End: 2023-11-13

## 2023-11-13 DIAGNOSIS — E11.9 DIABETIC EYE EXAM (HCC): ICD-10-CM

## 2023-11-13 DIAGNOSIS — R80.9 TYPE 2 DIABETES MELLITUS WITH MICROALBUMINURIA, WITHOUT LONG-TERM CURRENT USE OF INSULIN  (HCC): Primary | ICD-10-CM

## 2023-11-13 DIAGNOSIS — E11.29 TYPE 2 DIABETES MELLITUS WITH MICROALBUMINURIA, WITHOUT LONG-TERM CURRENT USE OF INSULIN  (HCC): Primary | ICD-10-CM

## 2023-11-13 DIAGNOSIS — Z01.00 DIABETIC EYE EXAM (HCC): ICD-10-CM

## 2023-11-13 NOTE — TELEPHONE ENCOUNTER
Specialty: Ophthalmology    Full Name of Specialist:Jamie Chavez    Name of the Provider Group:  Address:  Phone number:  Fax number :  NPI:    (NPI number of the service provider.  the nurses need this information for the referral.  Its for service providers only like Surgery*, [employer], [facility], Etc.  We not NOT need physician NPI's)    *Surgery:The NPI # should be of the location of the Surgery.    Date of Appointment:11/13/23    Reason for the Appointment (be specific with diagnosis code):    Has the patient seen a provider in our office for stated problem?:    Is this request for an out of network referral?   (if yes, please have patient contact referring provider and have them fax office visit notes to triage attention):

## 2023-11-15 NOTE — TELEPHONE ENCOUNTER
Referral Notes  Number of Notes: 1  .  Type Date User Summary Attachment   Prior Authorization Confirmed/Denied/NA 11/14/2023  8:33 AM Ling Aguilar - -   Note:  Approved per Availity        Referral approved.   Notified pt via MCM

## 2023-11-16 DIAGNOSIS — I77.1 TORTUOUS AORTA (HCC): ICD-10-CM

## 2023-11-16 DIAGNOSIS — I10 ESSENTIAL HYPERTENSION: ICD-10-CM

## 2023-11-16 RX ORDER — AMLODIPINE BESYLATE 10 MG/1
10 TABLET ORAL DAILY
Qty: 90 TABLET | Refills: 10 | Status: SHIPPED | OUTPATIENT
Start: 2023-11-16

## 2023-11-16 RX ORDER — IRBESARTAN 300 MG/1
300 TABLET ORAL NIGHTLY
Qty: 90 TABLET | Refills: 10 | Status: SHIPPED | OUTPATIENT
Start: 2023-11-16

## 2023-11-16 RX ORDER — OMEPRAZOLE 20 MG/1
20 CAPSULE, DELAYED RELEASE ORAL
Qty: 90 CAPSULE | Refills: 10 | Status: SHIPPED | OUTPATIENT
Start: 2023-11-16

## 2023-11-16 RX ORDER — PRAVASTATIN SODIUM 10 MG
10 TABLET ORAL NIGHTLY
Qty: 90 TABLET | Refills: 10 | Status: SHIPPED | OUTPATIENT
Start: 2023-11-16

## 2023-11-16 RX ORDER — METFORMIN HYDROCHLORIDE 500 MG/1
500 TABLET, EXTENDED RELEASE ORAL NIGHTLY
Qty: 90 TABLET | Refills: 10 | Status: SHIPPED | OUTPATIENT
Start: 2023-11-16

## 2023-12-28 ENCOUNTER — TELEPHONE (OUTPATIENT)
Dept: INTERNAL MEDICINE CLINIC | Facility: CLINIC | Age: 78
End: 2023-12-28

## 2023-12-28 NOTE — TELEPHONE ENCOUNTER
LOV 9/5/23     Called and spoke w/ pt's wife Billy. Stated pt's symptoms started 5 days ago. C/o cough and congestion. Pt tested for Covid today and yesterday, both negative. Stated pt has been taking Nyquil and cough drops. Denies fever, chest pain and SOB. Stated cough is productive and he is blowing his nose a lot. Stated appetite has been decreased but pt is drinking a lot of fluids. Advised to continue with cough drops, Nyquil and fluids. Can try nasal spray, humidifier and monitor temp. Notified if he develops SOB and chest pain should go to ER. AD - Would you add on for virtual visit? No apts this week or next week with you. Any other recs?

## 2023-12-29 ENCOUNTER — TELEMEDICINE (OUTPATIENT)
Dept: INTERNAL MEDICINE CLINIC | Facility: CLINIC | Age: 78
End: 2023-12-29
Payer: MEDICARE

## 2023-12-29 DIAGNOSIS — J01.90 ACUTE RHINOSINUSITIS: Primary | ICD-10-CM

## 2023-12-29 PROCEDURE — 1159F MED LIST DOCD IN RCRD: CPT | Performed by: INTERNAL MEDICINE

## 2023-12-29 PROCEDURE — 1160F RVW MEDS BY RX/DR IN RCRD: CPT | Performed by: INTERNAL MEDICINE

## 2023-12-29 PROCEDURE — 99213 OFFICE O/P EST LOW 20 MIN: CPT | Performed by: INTERNAL MEDICINE

## 2023-12-29 RX ORDER — AMOXICILLIN AND CLAVULANATE POTASSIUM 875; 125 MG/1; MG/1
1 TABLET, FILM COATED ORAL 2 TIMES DAILY
Qty: 20 TABLET | Refills: 0 | Status: SHIPPED | OUTPATIENT
Start: 2023-12-29 | End: 2024-01-08

## 2023-12-29 NOTE — PROGRESS NOTES
April Pérez  6/23/1945    No chief complaint on file. Video encounter    SUBJECTIVE   April Pérez is a 66year old male who presents with congestion. The patient reports a greater than 1 week duration of nasal and sinus congestion that has not responded to over-the-counter medical management. No associated fever. There is significant nasal drainage and discharge. COVID-19 test negative. Today he presents virtually for further evaluation. Review of Systems   No f/c/chest pain or sob. No abd pain/n/v/d. No ha or dizziness. No numbness, tingling, or weakness. No other complaints today. Current Outpatient Medications   Medication Sig Dispense Refill    PRAVASTATIN 10 MG Oral Tab TAKE 1 TABLET EVERY NIGHT 90 tablet 10    AMLODIPINE 10 MG Oral Tab TAKE 1 TABLET EVERY DAY 90 tablet 10    IRBESARTAN 300 MG Oral Tab TAKE 1 TABLET EVERY NIGHT 90 tablet 10    OMEPRAZOLE 20 MG Oral Capsule Delayed Release TAKE 1 CAPSULE (20 MG TOTAL) BY MOUTH BEFORE BREAKFAST. 90 capsule 10    METFORMIN  MG Oral Tablet 24 Hr TAKE 1 TABLET EVERY NIGHT 90 tablet 10    budesonide 0.5 MG/2ML Inhalation Suspension Mix 2 ml (0.5 mg) in honey or apple sauce and swallow twice daily. Rinse your mouth after taking and spit. Do not eat or drink for 30 minutes after use. 180 each 3    Glucose Blood (ACCU-CHEK PRATIMA PLUS) In Vitro Strip CHECK BLOOD GLUCOSE TWO TIMES DAILY 200 strip 0    hydrocortisone (ANUSOL-HC) 2.5 % External Cream Place 1 each rectally 2 (two) times daily as needed for Hemorrhoids. 28 g 3    empagliflozin (JARDIANCE) 10 MG Oral Tab Take 1 tablet (10 mg total) by mouth daily.  90 tablet 0    LATANOPROST 0.005 % Ophthalmic Solution INSTILL 1 DROP IN BOTH EYES EVERY NIGHT 5 mL 0    Blood Glucose Calibration (ACCU-CHEK PRATIMA) In Vitro Solution       Accu-Chek Softclix Lancets Does not apply Misc       Lancets Misc. (ACCU-CHEK SOFTCLIX LANCET DEV) Does not apply Kit         Allergies   Allergen Reactions Statins PAIN     Leg pain    Codeine UNKNOWN      Past Medical History:   Diagnosis Date    Constipation hardnes    Diabetes ([facility]     Diabetic eye exam ([facility] 06/30/2020    [address] [place] [address] Ophthalmology    High blood pressure     Osteoporosis 2010    Unspecified essential hypertension       Patient Active Problem List   Diagnosis    Type 2 diabetes mellitus without complication, without long-term current use of insulin ([facility]    Dyslipidemia with elevated low density lipoprotein (LDL) cholesterol and abnormally low high density lipoprotein cholesterol    Essential hypertension    Tortuous aorta (HCC)    Diabetic nephropathy associated with type 2 diabetes mellitus (HCC)    Tinnitus of both ears    1St degree AV block    Stage 2 chronic kidney disease    Burning mouth syndrome    Dermatitis    Vitiligo    Lymphocytic esophagitis    Proteinuria due to type 2 diabetes mellitus  (HCC)    Agatston CAC score 100-199    External hemorrhoid    Osteopenia of multiple sites      Past Surgical History:   Procedure Laterality Date    ELECTROCARDIOGRAM, COMPLETE  08-    Scanned to Media Tab - Date of Service 08-    EYE SURGERY      eye pressure      Social History     Socioeconomic History    Marital status:    Tobacco Use    Smoking status: Former     Years: 5     Types: Cigarettes    Smokeless tobacco: Never   Vaping Use    Vaping Use: Never used   Substance and Sexual Activity    Alcohol use: Yes     Alcohol/week: 0.0 standard drinks of alcohol     Comment: once in a month    Drug use: No   Other Topics Concern    Caffeine Concern Yes     Comment: (Coffee, Tea) 2 cups daily    Reaction to local anesthetic No         OBJECTIVE:   Vital sign and physical evaluation not completed during this virtual encounter. ASSESSMENT AND PLAN:   Alireza Azar is a 66year old male who presents with congestion.     Acute rhinosinusitis:  Augmentin ordered  May use over-the-counter supportive/symptomatic management  Contact office with persistence or worsening of symptoms    Total time spent: 10 minutes    The patient indicates understanding of these issues and agrees to the plan. TODAY'S ORDERS     No orders of the defined types were placed in this encounter. Meds & Refills:  Requested Prescriptions      No prescriptions requested or ordered in this encounter       Imaging & Consults:  None    No follow-ups on file. There are no Patient Instructions on file for this visit. All questions were answered and the patient agrees with the plan.      Thank you,  Getachew Yost MD

## 2024-01-18 RX ORDER — BLOOD SUGAR DIAGNOSTIC
STRIP MISCELLANEOUS
Qty: 200 STRIP | Refills: 3 | Status: SHIPPED | OUTPATIENT
Start: 2024-01-18

## 2024-01-28 ENCOUNTER — HOSPITAL ENCOUNTER (OUTPATIENT)
Facility: HOSPITAL | Age: 79
Setting detail: OBSERVATION
Discharge: HOME | End: 2024-01-30
Attending: EMERGENCY MEDICINE | Admitting: HOSPITALIST
Payer: MEDICARE

## 2024-01-28 ENCOUNTER — APPOINTMENT (OUTPATIENT)
Dept: GENERAL RADIOLOGY | Facility: HOSPITAL | Age: 79
End: 2024-01-28
Attending: EMERGENCY MEDICINE
Payer: MEDICARE

## 2024-01-28 DIAGNOSIS — R07.9 CHEST PAIN, RULE OUT ACUTE MYOCARDIAL INFARCTION: Primary | ICD-10-CM

## 2024-01-28 LAB
ALBUMIN SERPL-MCNC: 4.3 G/DL (ref 3.4–5)
ALBUMIN/GLOB SERPL: 0.9 {RATIO} (ref 1–2)
ALP LIVER SERPL-CCNC: 91 U/L
ANION GAP SERPL CALC-SCNC: 7 MMOL/L (ref 0–18)
AST SERPL-CCNC: 27 U/L (ref 15–37)
BASOPHILS # BLD AUTO: 0.03 X10(3) UL (ref 0–0.2)
BASOPHILS NFR BLD AUTO: 0.6 %
BILIRUB SERPL-MCNC: 0.7 MG/DL (ref 0.1–2)
BUN BLD-MCNC: 19 MG/DL (ref 9–23)
CALCIUM BLD-MCNC: 9.8 MG/DL (ref 8.5–10.1)
CHLORIDE SERPL-SCNC: 104 MMOL/L (ref 98–112)
CO2 SERPL-SCNC: 25 MMOL/L (ref 21–32)
CREAT BLD-MCNC: 1.07 MG/DL
EGFRCR SERPLBLD CKD-EPI 2021: 71 ML/MIN/1.73M2 (ref 60–?)
EOSINOPHIL # BLD AUTO: 0.2 X10(3) UL (ref 0–0.7)
EOSINOPHIL NFR BLD AUTO: 4.3 %
ERYTHROCYTE [DISTWIDTH] IN BLOOD BY AUTOMATED COUNT: 11.1 %
GLOBULIN PLAS-MCNC: 5 G/DL (ref 2.8–4.4)
GLUCOSE BLD-MCNC: 166 MG/DL (ref 70–99)
HCT VFR BLD AUTO: 44.2 %
HGB BLD-MCNC: 15.9 G/DL
IMM GRANULOCYTES # BLD AUTO: 0.01 X10(3) UL (ref 0–1)
IMM GRANULOCYTES NFR BLD: 0.2 %
LYMPHOCYTES # BLD AUTO: 1.73 X10(3) UL (ref 1–4)
LYMPHOCYTES NFR BLD AUTO: 37.1 %
MCH RBC QN AUTO: 30.5 PG (ref 26–34)
MCHC RBC AUTO-ENTMCNC: 36 G/DL (ref 31–37)
MCV RBC AUTO: 84.8 FL
MONOCYTES # BLD AUTO: 0.32 X10(3) UL (ref 0.1–1)
MONOCYTES NFR BLD AUTO: 6.9 %
NEUTROPHILS # BLD AUTO: 2.37 X10 (3) UL (ref 1.5–7.7)
NEUTROPHILS # BLD AUTO: 2.37 X10(3) UL (ref 1.5–7.7)
NEUTROPHILS NFR BLD AUTO: 50.9 %
OSMOLALITY SERPL CALC.SUM OF ELEC: 288 MOSM/KG (ref 275–295)
PLATELET # BLD AUTO: 224 10(3)UL (ref 150–450)
POTASSIUM SERPL-SCNC: 3.9 MMOL/L (ref 3.5–5.1)
PROT SERPL-MCNC: 9.3 G/DL (ref 6.4–8.2)
RBC # BLD AUTO: 5.21 X10(6)UL
SODIUM SERPL-SCNC: 136 MMOL/L (ref 136–145)
TROPONIN I SERPL HS-MCNC: 8 NG/L
WBC # BLD AUTO: 4.7 X10(3) UL (ref 4–11)

## 2024-01-28 PROCEDURE — 71045 X-RAY EXAM CHEST 1 VIEW: CPT | Performed by: EMERGENCY MEDICINE

## 2024-01-29 ENCOUNTER — TELEPHONE (OUTPATIENT)
Dept: INTERNAL MEDICINE CLINIC | Facility: CLINIC | Age: 79
End: 2024-01-29

## 2024-01-29 ENCOUNTER — APPOINTMENT (OUTPATIENT)
Dept: CV DIAGNOSTICS | Facility: HOSPITAL | Age: 79
End: 2024-01-29
Attending: HOSPITALIST
Payer: MEDICARE

## 2024-01-29 DIAGNOSIS — I10 ESSENTIAL HYPERTENSION: Primary | ICD-10-CM

## 2024-01-29 DIAGNOSIS — Z00.00 ROUTINE GENERAL MEDICAL EXAMINATION AT A HEALTH CARE FACILITY: ICD-10-CM

## 2024-01-29 DIAGNOSIS — E78.5 DYSLIPIDEMIA WITH ELEVATED LOW DENSITY LIPOPROTEIN (LDL) CHOLESTEROL AND ABNORMALLY LOW HIGH DENSITY LIPOPROTEIN CHOLESTEROL: ICD-10-CM

## 2024-01-29 PROBLEM — H40.1134 PRIMARY OPEN ANGLE GLAUCOMA (POAG) OF BOTH EYES, INDETERMINATE STAGE: Chronic | Status: ACTIVE | Noted: 2024-01-29

## 2024-01-29 PROBLEM — R07.9 CHEST PAIN, RULE OUT ACUTE MYOCARDIAL INFARCTION: Status: ACTIVE | Noted: 2024-01-29

## 2024-01-29 LAB
ATRIAL RATE: 73 BPM
D DIMER PPP FEU-MCNC: 0.33 UG/ML FEU (ref ?–0.78)
GLUCOSE BLD-MCNC: 126 MG/DL (ref 70–99)
GLUCOSE BLD-MCNC: 128 MG/DL (ref 70–99)
GLUCOSE BLD-MCNC: 131 MG/DL (ref 70–99)
GLUCOSE BLD-MCNC: 146 MG/DL (ref 70–99)
P AXIS: 69 DEGREES
P-R INTERVAL: 204 MS
Q-T INTERVAL: 400 MS
QRS DURATION: 122 MS
QTC CALCULATION (BEZET): 440 MS
R AXIS: 108 DEGREES
T AXIS: 52 DEGREES
TROPONIN I SERPL HS-MCNC: 11 NG/L
TROPONIN I SERPL HS-MCNC: 8 NG/L
VENTRICULAR RATE: 73 BPM

## 2024-01-29 PROCEDURE — 93306 TTE W/DOPPLER COMPLETE: CPT | Performed by: HOSPITALIST

## 2024-01-29 PROCEDURE — B246ZZZ ULTRASONOGRAPHY OF RIGHT AND LEFT HEART: ICD-10-PCS | Performed by: INTERNAL MEDICINE

## 2024-01-29 PROCEDURE — 99223 1ST HOSP IP/OBS HIGH 75: CPT | Performed by: HOSPITALIST

## 2024-01-29 PROCEDURE — B2211ZZ COMPUTERIZED TOMOGRAPHY (CT SCAN) OF MULTIPLE CORONARY ARTERIES USING LOW OSMOLAR CONTRAST: ICD-10-PCS | Performed by: INTERNAL MEDICINE

## 2024-01-29 RX ORDER — DILTIAZEM HYDROCHLORIDE 5 MG/ML
5 INJECTION INTRAVENOUS SEE ADMIN INSTRUCTIONS
OUTPATIENT
Start: 2024-01-29

## 2024-01-29 RX ORDER — METOPROLOL TARTRATE 1 MG/ML
5 INJECTION, SOLUTION INTRAVENOUS SEE ADMIN INSTRUCTIONS
OUTPATIENT
Start: 2024-01-29

## 2024-01-29 RX ORDER — METOPROLOL TARTRATE 50 MG/1
50 TABLET, FILM COATED ORAL ONCE
Status: DISCONTINUED | OUTPATIENT
Start: 2024-01-30 | End: 2024-01-30

## 2024-01-29 RX ORDER — ASPIRIN 81 MG/1
324 TABLET, CHEWABLE ORAL ONCE
Status: COMPLETED | OUTPATIENT
Start: 2024-01-29 | End: 2024-01-29

## 2024-01-29 RX ORDER — SODIUM CHLORIDE 9 MG/ML
INJECTION, SOLUTION INTRAVENOUS ONCE
Status: COMPLETED | OUTPATIENT
Start: 2024-01-29 | End: 2024-01-29

## 2024-01-29 RX ORDER — LATANOPROST 50 UG/ML
1 SOLUTION/ DROPS OPHTHALMIC NIGHTLY
Status: DISCONTINUED | OUTPATIENT
Start: 2024-01-29 | End: 2024-01-30

## 2024-01-29 RX ORDER — NITROGLYCERIN 0.4 MG/1
0.4 TABLET SUBLINGUAL ONCE
OUTPATIENT
Start: 2024-01-29 | End: 2024-01-29

## 2024-01-29 RX ORDER — METOPROLOL TARTRATE 50 MG/1
100 TABLET, FILM COATED ORAL ONCE AS NEEDED
Status: ACTIVE | OUTPATIENT
Start: 2024-01-29 | End: 2024-01-29

## 2024-01-29 RX ORDER — ENOXAPARIN SODIUM 100 MG/ML
40 INJECTION SUBCUTANEOUS DAILY
Status: DISCONTINUED | OUTPATIENT
Start: 2024-01-29 | End: 2024-01-30

## 2024-01-29 RX ORDER — AMLODIPINE BESYLATE 5 MG/1
10 TABLET ORAL DAILY
Status: DISCONTINUED | OUTPATIENT
Start: 2024-01-29 | End: 2024-01-30

## 2024-01-29 RX ORDER — ONDANSETRON 2 MG/ML
4 INJECTION INTRAMUSCULAR; INTRAVENOUS EVERY 6 HOURS PRN
Status: DISCONTINUED | OUTPATIENT
Start: 2024-01-29 | End: 2024-01-30

## 2024-01-29 RX ORDER — ENEMA 19; 7 G/133ML; G/133ML
1 ENEMA RECTAL ONCE AS NEEDED
Status: DISCONTINUED | OUTPATIENT
Start: 2024-01-29 | End: 2024-01-30

## 2024-01-29 RX ORDER — LOSARTAN POTASSIUM 100 MG/1
100 TABLET ORAL NIGHTLY
Status: DISCONTINUED | OUTPATIENT
Start: 2024-01-29 | End: 2024-01-30

## 2024-01-29 RX ORDER — POLYETHYLENE GLYCOL 3350 17 G/17G
17 POWDER, FOR SOLUTION ORAL DAILY PRN
Status: DISCONTINUED | OUTPATIENT
Start: 2024-01-29 | End: 2024-01-30

## 2024-01-29 RX ORDER — METOCLOPRAMIDE HYDROCHLORIDE 5 MG/ML
10 INJECTION INTRAMUSCULAR; INTRAVENOUS EVERY 8 HOURS PRN
Status: DISCONTINUED | OUTPATIENT
Start: 2024-01-29 | End: 2024-01-30

## 2024-01-29 RX ORDER — METOPROLOL TARTRATE 50 MG/1
100 TABLET, FILM COATED ORAL ONCE AS NEEDED
Status: DISCONTINUED | OUTPATIENT
Start: 2024-01-30 | End: 2024-01-30

## 2024-01-29 RX ORDER — METOPROLOL TARTRATE 50 MG/1
100 TABLET, FILM COATED ORAL ONCE AS NEEDED
Status: DISCONTINUED | OUTPATIENT
Start: 2024-01-29 | End: 2024-01-30

## 2024-01-29 RX ORDER — SENNOSIDES 8.6 MG
17.2 TABLET ORAL NIGHTLY PRN
Status: DISCONTINUED | OUTPATIENT
Start: 2024-01-29 | End: 2024-01-30

## 2024-01-29 RX ORDER — ACETAMINOPHEN 500 MG
500 TABLET ORAL EVERY 4 HOURS PRN
Status: DISCONTINUED | OUTPATIENT
Start: 2024-01-29 | End: 2024-01-30

## 2024-01-29 RX ORDER — PANTOPRAZOLE SODIUM 20 MG/1
20 TABLET, DELAYED RELEASE ORAL
Status: DISCONTINUED | OUTPATIENT
Start: 2024-01-29 | End: 2024-01-30

## 2024-01-29 RX ORDER — METOPROLOL TARTRATE 50 MG/1
50 TABLET, FILM COATED ORAL ONCE
Status: DISCONTINUED | OUTPATIENT
Start: 2024-01-29 | End: 2024-01-30

## 2024-01-29 RX ORDER — METOPROLOL TARTRATE 50 MG/1
100 TABLET, FILM COATED ORAL ONCE
Status: DISCONTINUED | OUTPATIENT
Start: 2024-01-29 | End: 2024-01-30

## 2024-01-29 RX ORDER — METOPROLOL TARTRATE 50 MG/1
50 TABLET, FILM COATED ORAL ONCE AS NEEDED
Status: ACTIVE | OUTPATIENT
Start: 2024-01-29 | End: 2024-01-29

## 2024-01-29 RX ORDER — METOPROLOL TARTRATE 50 MG/1
100 TABLET, FILM COATED ORAL ONCE
Status: DISCONTINUED | OUTPATIENT
Start: 2024-01-30 | End: 2024-01-30

## 2024-01-29 RX ORDER — METOPROLOL TARTRATE 50 MG/1
50 TABLET, FILM COATED ORAL ONCE AS NEEDED
Status: DISCONTINUED | OUTPATIENT
Start: 2024-01-30 | End: 2024-01-30

## 2024-01-29 RX ORDER — PRAVASTATIN SODIUM 10 MG
10 TABLET ORAL NIGHTLY
Status: DISCONTINUED | OUTPATIENT
Start: 2024-01-29 | End: 2024-01-30

## 2024-01-29 RX ORDER — METOPROLOL TARTRATE 50 MG/1
50 TABLET, FILM COATED ORAL ONCE AS NEEDED
Status: DISCONTINUED | OUTPATIENT
Start: 2024-01-29 | End: 2024-01-30

## 2024-01-29 RX ORDER — BISACODYL 10 MG
10 SUPPOSITORY, RECTAL RECTAL
Status: DISCONTINUED | OUTPATIENT
Start: 2024-01-29 | End: 2024-01-30

## 2024-01-29 NOTE — CONSULTS
Cardiology Consultation    Matthew Marks Patient Status:  Observation    1945 MRN [id number]   Location [facility] 0SW-A Attending Pj Young, DO   Hosp Day # 0 PCP Jonny Hansen MD     Reason for Consultation:  chest pain, labile BP's      History of Present Illness:  Matthew Marks is a a(n) 78 year old male. Nice constantin with h/o HTN, diabetes, and hyperlipidemia.  Also with h/o Asx bradycardia.  He presents with upper left sided chest pressure going into the neck last night.  It started at 8:30 pm.  BP was 180 systolic.  It lasted an hour, resolved as he was getting to ER.  No dyspnea or light headedness.  EKG showed a new RBBB.  Serail trop's negative.  Over night has Asx second degree AVB, no pauses.  This morning had mild brief chest pressure.  BP dropped to 70 mmHg systolic, on recheck 90 systolic, ASx.    History:  Past Medical History:   Diagnosis Date    Constipation hardnes    Diabetes ([facility])     Diabetic eye exam ([facility]) 2020    [facility]    High blood pressure     Osteoporosis 2010    Unspecified essential hypertension      Past Surgical History:   Procedure Laterality Date    ELECTROCARDIOGRAM, COMPLETE  2012    Scanned to Media Tab - Date of Service 2012    EYE SURGERY      eye pressure     Family History   Problem Relation Age of Onset    Heart Disease Father     Diabetes Mother     Hypertension Mother          Allergies:  Allergies   Allergen Reactions    Statins PAIN     Leg pain    Codeine UNKNOWN       Medications:   amLODIPine  10 mg Oral Daily    losartan  100 mg Oral Nightly    latanoprost  1 drop Both Eyes Nightly    pantoprazole  20 mg Oral QAM AC    pravastatin  10 mg Oral Nightly    enoxaparin  40 mg Subcutaneous Daily       Continuous Infusions:      Social History:   reports that he has quit smoking. His smoking use included cigarettes. He has never used smokeless tobacco. He reports current alcohol use. He reports that he does not use  drugs.    Review of Systems:  All systems were reviewed and are negative except as described above in HPI.    Physical Exam:      Temp:  [97.1 °F (36.2 °C)-97.8 °F (36.6 °C)] 97.4 °F (36.3 °C)  Pulse:  [51-73] 51  Resp:  [16-18] 18  BP: ()/(46-89) 66/46  SpO2:  [96 %-100 %] 100 %    Last 3 Weights   01/29/24 0144 142 lb (64.4 kg)   01/28/24 2247 140 lb (63.5 kg)   09/25/23 1328 145 lb (65.8 kg)   09/05/23 1119 142 lb (64.4 kg)           General: No apparent distress  HEENT: No focal deficits.  Neck: supple. JVP normal  Cardiac: Regular rhythm, S1, S2 normal,   No rub or gallop.  No murmur.  Lungs: CTA  Abdomen: Soft, non-tender.   Extremities: No edema  Neurologic: no focal deficits  Skin: Warm and dry.          Telemetry: reviewed    Laboratories and Data:  Diagnostics:    EKG, 1/29/2024:  RBBB    CXR, 1/29/2024:  clear    Labs:   HEM:  Recent Labs   Lab 01/28/24  2308   WBC 4.7   HGB 15.9   .0       Chem:  Recent Labs   Lab 01/28/24  2308      K 3.9      CO2 25.0   BUN 19   CREATSERUM 1.07   CA 9.8   *       Recent Labs   Lab 01/28/24  2308   AST 27   ALB 4.3       No results for input(s): \"PTP\", \"INR\" in the last 168 hours.    No results for input(s): \"TROP\", \"CK\" in the last 168 hours.      Impression:   Chest pressure - one hour duration, normal trop's.  RBBB, new.  Labile BP's.  Asx sinus jean pierre, second degree AVB, seen in 2020 as well.  Diabetes.      Plan:  Await echo.  Follow tele.  D dimer.  Will f/u today with regards to ischemic w/u.    Rey Cleary MD  1/29/2024  7:51 AM  C5

## 2024-01-29 NOTE — PLAN OF CARE
Began taking care of patient this am.  Patient called me to the room, had a mild brief episode of chest pressure, no dizziness, that resolved on its own in a few minutes.  At this time  BP dropped to 70 mmHg systolic and patient became sweaty.  Bolus started, SBP quickly above 100.  AM b/p med help.  At this time patient feels better, he had breakfast, walked to bathroom with no issues,  Dr. Cleary  at bedside during episode.   Echo in room with patient at this time.     Patient had no further episodes this shift.    Problem: CARDIOVASCULAR - ADULT  Goal: Maintains optimal cardiac output and hemodynamic stability  Description: INTERVENTIONS:  - Monitor vital signs, rhythm, and trends  - Monitor for bleeding, hypotension and signs of decreased cardiac output  - Evaluate effectiveness of vasoactive medications to optimize hemodynamic stability  - Monitor arterial and/or venous puncture sites for bleeding and/or hematoma  - Assess quality of pulses, skin color and temperature  - Assess for signs of decreased coronary artery perfusion - ex. Angina  - Evaluate fluid balance, assess for edema, trend weights  Outcome: Progressing  Goal: Absence of cardiac arrhythmias or at baseline  Description: INTERVENTIONS:  - Continuous cardiac monitoring, monitor vital signs, obtain 12 lead EKG if indicated  - Evaluate effectiveness of antiarrhythmic and heart rate control medications as ordered  - Initiate emergency measures for life threatening arrhythmias  - Monitor electrolytes and administer replacement therapy as ordered  Outcome: Progressing

## 2024-01-29 NOTE — ED PROVIDER NOTES
Patient Seen in: [facility] Emergency Department      History     Chief Complaint   Patient presents with    Chest Pain Angina     Stated Complaint: chest pain    Subjective:   HPI    Patient is a 78-year-old male with history including hypertension, high cholesterol, diabetes presenting for evaluation of left-sided chest tightness.  About started about 830 tonight while he was sitting at home.  Restarted left anterior shoulder and radiates across the upper chest.  It has been there since although has gradually faded and now is almost completely gone.  He did not feel short of breath, diaphoretic, or nauseous with it.  Has never had a pain like this before.    Objective:   Past Medical History:   Diagnosis Date    Constipation hardnes    Diabetes ([facility])     Diabetic eye exam ([facility]) 06/30/2020    [facility]    High blood pressure     Osteoporosis 2010    Unspecified essential hypertension               Past Surgical History:   Procedure Laterality Date    ELECTROCARDIOGRAM, COMPLETE  08-    Scanned to Media Tab - Date of Service 08-    EYE SURGERY      eye pressure                Social History     Socioeconomic History    Marital status:    Tobacco Use    Smoking status: Former     Years: 5     Types: Cigarettes    Smokeless tobacco: Never   Vaping Use    Vaping Use: Never used   Substance and Sexual Activity    Alcohol use: Yes     Alcohol/week: 0.0 standard drinks of alcohol     Comment: once in a month    Drug use: No   Other Topics Concern    Caffeine Concern Yes     Comment: (Coffee, Tea) 2 cups daily    Reaction to local anesthetic No              Review of Systems    Positive for stated complaint: chest pain  Other systems are as noted in HPI.  Constitutional and vital signs reviewed.      All other systems reviewed and negative except as noted above.    Physical Exam     ED Triage Vitals [01/28/24 2247]   BP (!) 183/89   Pulse 73   Resp 16   Temp 97.1 °F (36.2 °C)   Temp  src Temporal   SpO2 99 %   O2 Device None (Room air)       Current:BP (!) 183/89   Pulse 73   Temp 97.1 °F (36.2 °C) (Temporal)   Resp 16   Ht 167.6 cm (5' 6\")   Wt 63.5 kg   SpO2 99%   BMI 22.60 kg/m²         Physical Exam  Vitals and nursing note reviewed.   Constitutional:       Appearance: He is well-developed.   HENT:      Head: Normocephalic and atraumatic.   Eyes:      Conjunctiva/sclera: Conjunctivae normal.      Pupils: Pupils are equal, round, and reactive to light.   Cardiovascular:      Rate and Rhythm: Normal rate and regular rhythm.      Heart sounds: Normal heart sounds.   Pulmonary:      Effort: Pulmonary effort is normal.      Breath sounds: Normal breath sounds.   Abdominal:      General: Bowel sounds are normal.      Palpations: Abdomen is soft.   Musculoskeletal:         General: Normal range of motion.      Cervical back: Normal range of motion and neck supple.   Skin:     General: Skin is warm and dry.   Neurological:      Mental Status: He is alert and oriented to person, place, and time.               ED Course     Labs Reviewed   COMP METABOLIC PANEL (14) - Abnormal; Notable for the following components:       Result Value    Glucose 166 (*)     Total Protein 9.3 (*)     Globulin  5.0 (*)     A/G Ratio 0.9 (*)     All other components within normal limits   TROPONIN I HIGH SENSITIVITY - Normal   CBC WITH DIFFERENTIAL WITH PLATELET    Narrative:     The following orders were created for panel order CBC With Differential With Platelet.  Procedure                               Abnormality         Status                     ---------                               -----------         ------                     CBC W/ DIFFERENTIAL[[id number]]                              Final result                 Please view results for these tests on the individual orders.   RAINBOW DRAW GOLD   RAINBOW DRAW BLUE   CBC W/ DIFFERENTIAL     EKG    Rate, intervals and axes as noted on EKG Report.  Rate:  73  Rhythm: Sinus Rhythm  Reading: Sinus rhythm.  Right bundle branch block.  No ST segment or T wave changes.  No old EKG available for comparison.             Heart Score:    HEART Score      Title      Criteria Score   Age: 65 and older Age Score: 2   History: Mod Suspicious Hx Score: 1     EKG: Non-Spec Changes EKG Score: 1   HTN: Yes   Hypercholesterolemia: Yes   Atherosclerosis/PVD: No     DM: Yes   BMI>30kg/m2: No   Smoking: No   Family History: No         Other Risk Factor Score: 3             Lab Results   Component Value Date    TROP <0.045 11/14/2020    TROPHS 8 01/28/2024           HEART Score: 6        Risk of adverse cardiac event is 12-16.6%                 XR CHEST AP PORTABLE  (CPT=71045)    Result Date: 1/28/2024  PROCEDURE:  XR CHEST AP PORTABLE  (CPT=71045)  TECHNIQUE:  AP chest radiograph was obtained.  COMPARISON:  None.  INDICATIONS:  chest pain  PATIENT STATED HISTORY: (As transcribed by Technologist)  Patient is experiencing left sided tightness for a couple hours with elevated blood pressure.   FINDINGS:  The cardiomediastinal silhouette is within normal limits.  There is no consolidation, effusion, or pneumothorax.  No aggressive osseous lesions are identified.            CONCLUSION: No acute cardiopulmonary abnormality.   LOCATION:  [facility]      Dictated by (CST): Hector Egan MD on 1/28/2024 at 11:39 PM     Finalized by (CST): Hector Egan MD on 1/28/2024 at 11:40 PM               MDM      78-year-old male with risk factors including hypertension, high cholesterol, diabetes presenting for evaluation of left upper chest tightness.  Started while he was just sitting at home about 3 hours ago, constant since although has faded away and is now almost completely gone.  EKG is unremarkable, right bundle branch block but not concerning for STEMI.  Patient states he has not had a stress test in the years.  Differential includes ACS, symptomatic hypertension, muscle spasm, chest wall pain.  Labs  ordered.  Discussed giving him a dose of aspirin and he states at one point he was told he should not take it because he had a nosebleed after taking it.  He is willing to do 1 dose.  Discussed giving him a dose of sublingual nitroglycerin although his pain is minimal now, he states he once had that and passed out and does not want it.  Discussed with patient that with his risk factors, even if initial troponin is negative I feel he should probably be admitted for serial troponins and stress testing.  Admission disposition: 1/29/2024 12:29 AM           Update at 12:25 AM.  Initial workup is unremarkable with negative troponin, normal chest x-ray.  Discussed results.  He is pain-free now.  I do think he should be admitted for serial troponins and likely stress test in the morning.  He agrees with the plan.        Past Medical History-hypertension, diabetes, high cholesterol    Differential diagnosis before testing included ACS, muscle spasm, symptomatic hypertension    Co-morbidities that add to the complexity of management include: None    Testing ordered during this visit included labs, chest x-ray    Radiographic images  I personally reviewed the radiographs and my individual interpretation shows no infiltrate or pneumothorax  I also reviewed the official reports that showed no infiltrate or pneumothorax      History obtained by an independent source included from family at bedside        Medications Provided: Aspirin            Disposition:    Admission  I have discussed with the patient the results of test, differential diagnosis, and treatment plan. They expressed clear understanding of these instructions and agrees to the plan provided.                                Medical Decision Making      Disposition and Plan     Clinical Impression:  1. Chest pain, rule out acute myocardial infarction         Disposition:  Admit  1/29/2024 12:29 am    Follow-up:  No follow-up provider specified.        Medications  Prescribed:  Current Discharge Medication List                            Hospital Problems       Present on Admission  Date Reviewed: 12/29/2023            ICD-10-CM Noted POA    * (Principal) Chest pain, rule out acute myocardial infarction R07.9 1/29/2024 Unknown

## 2024-01-29 NOTE — ED QUICK NOTES
Orders for admission, patient is aware of plan and ready to go upstairs. Any questions, please call ED RN Analisa at extension [phone].     Patient Covid vaccination status: Fully vaccinated     COVID Test Ordered in ED: None    COVID Suspicion at Admission: N/A    Running Infusions:  None    Mental Status/LOC at time of transport: a/ox4    Other pertinent information:   CIWA score: N/A   NIH score:  N/A

## 2024-01-29 NOTE — PLAN OF CARE
Patient A&O x4, room air, no c/o pain upon arrival to unit.  Patient came to the ER with chest pain that radiated to his left shoulder.  Patient able to ambulate to bed and bathroom without issue.  Patient is Sinus Min while sleeping, HR as low as 38 for a split second, but mostly in the 40-50 range.  Tele monitor called and stated patient has a 1st degree block, 2nd degree wenckebach, and R BBB.  Strip saved to patient's chart.

## 2024-01-29 NOTE — H&P
[facility]  [facility]     Matthew Marks Patient Status:  Emergency    1945 MRN [id number]   [facility] EMERGENCY DEPARTMENT Attending Rodney Garcia MD   Hosp Day # 0 PCP Jonny Hansen MD     Chief Complaint: Chest pain    Subjective:    History of Present Illness:     Matthew Marks is a 78 year old male with history of hypertension, type 2 diabetes, hyperlipidemia, GERD presents emergency room with chest pain.  Patient describes as a tightness that is in the left side of his chest.  Patient says the pain started around 830 this evening while sitting at home.  Patient states that the pain rated 8 out of 10 radiating to the left shoulder and across the upper chest.  Patient states the pain was constant and only after coming to the ER is slowly starting to improve.  Patient denies any nausea, vomiting, shortness of breath, palpitations.  No cough, diaphoresis.    History/Other:    Past Medical History:  Past Medical History:   Diagnosis Date    Constipation hardnes    Diabetes ([facility])     Diabetic eye exam ([facility]) 2020    [facility]    High blood pressure     Osteoporosis 2010    Unspecified essential hypertension      Past Surgical History:   Past Surgical History:   Procedure Laterality Date    ELECTROCARDIOGRAM, COMPLETE  2012    Scanned to Media Tab - Date of Service 2012    EYE SURGERY      eye pressure      Family History:   Family History   Problem Relation Age of Onset    Heart Disease Father     Diabetes Mother     Hypertension Mother      Social History:    reports that he has quit smoking. His smoking use included cigarettes. He has never used smokeless tobacco. He reports current alcohol use. He reports that he does not use drugs.     Allergies:   Allergies   Allergen Reactions    Statins PAIN     Leg pain    Codeine UNKNOWN       Medications:    No current facility-administered medications on file prior to encounter.      Current Outpatient Medications on File Prior to Encounter   Medication Sig Dispense Refill    Glucose Blood (ACCU-CHEK PRATIMA PLUS) In Vitro Strip CHECK BLOOD GLUCOSE TWO TIMES DAILY 200 strip 3    [] amoxicillin clavulanate 875-125 MG Oral Tab Take 1 tablet by mouth 2 (two) times daily for 10 days. 20 tablet 0    PRAVASTATIN 10 MG Oral Tab TAKE 1 TABLET EVERY NIGHT 90 tablet 10    AMLODIPINE 10 MG Oral Tab TAKE 1 TABLET EVERY DAY 90 tablet 10    IRBESARTAN 300 MG Oral Tab TAKE 1 TABLET EVERY NIGHT 90 tablet 10    OMEPRAZOLE 20 MG Oral Capsule Delayed Release TAKE 1 CAPSULE (20 MG TOTAL) BY MOUTH BEFORE BREAKFAST. 90 capsule 10    METFORMIN  MG Oral Tablet 24 Hr TAKE 1 TABLET EVERY NIGHT 90 tablet 10    budesonide 0.5 MG/2ML Inhalation Suspension Mix 2 ml (0.5 mg) in honey or apple sauce and swallow twice daily.  Rinse your mouth after taking and spit.  Do not eat or drink for 30 minutes after use. 180 each 3    hydrocortisone (ANUSOL-HC) 2.5 % External Cream Place 1 each rectally 2 (two) times daily as needed for Hemorrhoids. 28 g 3    empagliflozin (JARDIANCE) 10 MG Oral Tab Take 1 tablet (10 mg total) by mouth daily. 90 tablet 0    LATANOPROST 0.005 % Ophthalmic Solution INSTILL 1 DROP IN BOTH EYES EVERY NIGHT 5 mL 0    Blood Glucose Calibration (ACCU-CHEK PRATIMA) In Vitro Solution       Accu-Chek Softclix Lancets Does not apply Misc       Lancets Misc. (ACCU-CHEK SOFTCLIX LANCET DEV) Does not apply Kit          Review of Systems:   A comprehensive review of systems was completed.    Pertinent positives and negatives noted in the HPI.    Objective:   Physical Exam:    BP (!) 183/89   Pulse 73   Temp 97.1 °F (36.2 °C) (Temporal)   Resp 16   Ht 5' 6\" (1.676 m)   Wt 140 lb (63.5 kg)   SpO2 99%   BMI 22.60 kg/m²   General: No acute distress, Alert  Respiratory: No rhonchi, no wheezes  Cardiovascular: S1, S2. Regular rate and rhythm  Abdomen: Soft, Non-tender, non-distended, positive bowel  sounds  Neuro: No new focal deficits  Extremities: No edema      Results:    Labs:      Labs Last 24 Hours:    Recent Labs   Lab 01/28/24 2308   RBC 5.21   HGB 15.9   HCT 44.2   MCV 84.8   MCH 30.5   MCHC 36.0   RDW 11.1   NEPRELIM 2.37   WBC 4.7   .0       Recent Labs   Lab 01/28/24 2308   *   BUN 19   CREATSERUM 1.07   EGFRCR 71   CA 9.8   ALB 4.3      K 3.9      CO2 25.0   ALKPHO 91   AST 27   BILT 0.7   TP 9.3*       No results found for: \"PT\", \"INR\"    Recent Labs   Lab 01/28/24 2308   TROPHS 8       No results for input(s): \"TROP\", \"PBNP\" in the last 168 hours.    No results for input(s): \"PCT\" in the last 168 hours.    Imaging: Imaging data reviewed in Epic.    Assessment & Plan:      # Chest pain   - will trend troponins   - Echo in the am   - repeat EKG in the am   - Cardiology on consult    # Hypertension   -Will continue on amlodipine, ARB.    # Type 2 diabetes   -Will place on hyperglycemia protocol with correction factor insulin.    # Glaucoma   - we will continue on eyedrops.    # Hyperlipidemia   -Will continue on statin therapy.    # GERD   -Will continue on PPI        Plan of care discussed with patient at bedside.    Gregorio Gomez, DO    Supplementary Documentation:     The 21st Century Cures Act makes medical notes like these available to patients in the interest of transparency. Please be advised this is a medical document. Medical documents are intended to carry relevant information, facts as evident, and the clinical opinion of the practitioner. The medical note is intended as peer to peer communication and may appear blunt or direct. It is written in medical language and may contain abbreviations or verbiage that are unfamiliar.

## 2024-01-30 ENCOUNTER — APPOINTMENT (OUTPATIENT)
Dept: CT IMAGING | Facility: HOSPITAL | Age: 79
End: 2024-01-30
Attending: INTERNAL MEDICINE
Payer: MEDICARE

## 2024-01-30 VITALS
DIASTOLIC BLOOD PRESSURE: 64 MMHG | RESPIRATION RATE: 16 BRPM | BODY MASS INDEX: 22.82 KG/M2 | OXYGEN SATURATION: 96 % | SYSTOLIC BLOOD PRESSURE: 108 MMHG | TEMPERATURE: 98 F | WEIGHT: 142 LBS | HEIGHT: 66 IN | HEART RATE: 50 BPM

## 2024-01-30 LAB
ANION GAP SERPL CALC-SCNC: 8 MMOL/L (ref 0–18)
BASOPHILS # BLD AUTO: 0.03 X10(3) UL (ref 0–0.2)
BASOPHILS NFR BLD AUTO: 0.6 %
BUN BLD-MCNC: 17 MG/DL (ref 9–23)
CALCIUM BLD-MCNC: 9.3 MG/DL (ref 8.5–10.1)
CHLORIDE SERPL-SCNC: 108 MMOL/L (ref 98–112)
CO2 SERPL-SCNC: 21 MMOL/L (ref 21–32)
CREAT BLD-MCNC: 1.14 MG/DL
EGFRCR SERPLBLD CKD-EPI 2021: 66 ML/MIN/1.73M2 (ref 60–?)
EOSINOPHIL # BLD AUTO: 0.09 X10(3) UL (ref 0–0.7)
EOSINOPHIL NFR BLD AUTO: 1.9 %
ERYTHROCYTE [DISTWIDTH] IN BLOOD BY AUTOMATED COUNT: 11.3 %
GLUCOSE BLD-MCNC: 107 MG/DL (ref 70–99)
GLUCOSE BLD-MCNC: 112 MG/DL (ref 70–99)
GLUCOSE BLD-MCNC: 171 MG/DL (ref 70–99)
HCT VFR BLD AUTO: 41.2 %
HGB BLD-MCNC: 14.6 G/DL
IMM GRANULOCYTES # BLD AUTO: 0.01 X10(3) UL (ref 0–1)
IMM GRANULOCYTES NFR BLD: 0.2 %
LYMPHOCYTES # BLD AUTO: 2.01 X10(3) UL (ref 1–4)
LYMPHOCYTES NFR BLD AUTO: 43 %
MCH RBC QN AUTO: 30.9 PG (ref 26–34)
MCHC RBC AUTO-ENTMCNC: 35.4 G/DL (ref 31–37)
MCV RBC AUTO: 87.3 FL
MONOCYTES # BLD AUTO: 0.36 X10(3) UL (ref 0.1–1)
MONOCYTES NFR BLD AUTO: 7.7 %
NEUTROPHILS # BLD AUTO: 2.17 X10 (3) UL (ref 1.5–7.7)
NEUTROPHILS # BLD AUTO: 2.17 X10(3) UL (ref 1.5–7.7)
NEUTROPHILS NFR BLD AUTO: 46.6 %
OSMOLALITY SERPL CALC.SUM OF ELEC: 290 MOSM/KG (ref 275–295)
PLATELET # BLD AUTO: 234 10(3)UL (ref 150–450)
POTASSIUM SERPL-SCNC: 3.6 MMOL/L (ref 3.5–5.1)
RBC # BLD AUTO: 4.72 X10(6)UL
SODIUM SERPL-SCNC: 137 MMOL/L (ref 136–145)
WBC # BLD AUTO: 4.7 X10(3) UL (ref 4–11)

## 2024-01-30 PROCEDURE — 75574 CT ANGIO HRT W/3D IMAGE: CPT | Performed by: INTERNAL MEDICINE

## 2024-01-30 PROCEDURE — 99239 HOSP IP/OBS DSCHRG MGMT >30: CPT | Performed by: STUDENT IN AN ORGANIZED HEALTH CARE EDUCATION/TRAINING PROGRAM

## 2024-01-30 RX ORDER — NITROGLYCERIN 0.4 MG/1
TABLET SUBLINGUAL
Status: COMPLETED
Start: 2024-01-30 | End: 2024-01-30

## 2024-01-30 RX ORDER — TIMOLOL MALEATE 5 MG/ML
1 SOLUTION/ DROPS OPHTHALMIC DAILY
COMMUNITY

## 2024-01-30 RX ORDER — ASPIRIN 81 MG/1
81 TABLET ORAL DAILY
Status: SHIPPED | COMMUNITY
Start: 2024-01-30

## 2024-01-30 RX ORDER — NITROGLYCERIN 0.4 MG/1
0.4 TABLET SUBLINGUAL ONCE
Status: COMPLETED | OUTPATIENT
Start: 2024-01-30 | End: 2024-01-30

## 2024-01-30 NOTE — PROGRESS NOTES
NURSING DISCHARGE NOTE    Discharged Home via Wheelchair.  Accompanied by Spouse  Belongings Taken by patient/family.

## 2024-01-30 NOTE — PROGRESS NOTES
[facility]     Hospitalist Progress Note     Matthew KEVIN Marks Patient Status:  Observation    1945 MRN [id number]   Location [facility] 0SW-A Attending Pj Young,    Hosp Day # 0 PCP Jonny Hansen MD     Chief Complaint: Chest pain     Subjective:     Patient reports no chest pain     Objective:    Review of Systems:   A comprehensive review of systems was completed; pertinent positive and negatives stated in subjective.    Vital signs:  Temp:  [97.7 °F (36.5 °C)-98.2 °F (36.8 °C)] 97.8 °F (36.6 °C)  Pulse:  [46-78] 50  Resp:  [16-18] 16  BP: (108-156)/() 108/64  SpO2:  [92 %-100 %] 96 %    Physical Exam:    General: No acute distress  Respiratory: No wheezes, no rhonchi  Cardiovascular: S1, S2, regular rate and rhythm  Abdomen: Soft, Non-tender, non-distended, positive bowel sounds  Neuro: No new focal deficits.   Extremities: No edema      Diagnostic Data:    Labs:  Recent Labs   Lab 24  0840   WBC 4.7 4.7   HGB 15.9 14.6   MCV 84.8 87.3   .0 234.0       Recent Labs   Lab 24  0840   * 171*   BUN 19 17   CREATSERUM 1.07 1.14   CA 9.8 9.3   ALB 4.3  --     137   K 3.9 3.6    108   CO2 25.0 21.0   ALKPHO 91  --    AST 27  --    BILT 0.7  --    TP 9.3*  --        Estimated Creatinine Clearance: 48.2 mL/min (based on SCr of 1.14 mg/dL).    Recent Labs   Lab 24  0606 24  1153   TROPHS 8 11 8       No results for input(s): \"PTP\", \"INR\" in the last 168 hours.               Microbiology    No results found for this visit on 24.      Imaging: Reviewed in Epic.    Medications:    amLODIPine  10 mg Oral Daily    losartan  100 mg Oral Nightly    latanoprost  1 drop Both Eyes Nightly    pantoprazole  20 mg Oral QAM AC    pravastatin  10 mg Oral Nightly    enoxaparin  40 mg Subcutaneous Daily    metoprolol tartrate  50 mg Oral Once    Or    metoprolol tartrate  100 mg Oral Once    metoprolol  tartrate  50 mg Oral Once    Or    metoprolol tartrate  100 mg Oral Once       Assessment & Plan:      #Acute chest pain  -Trend troponin  -Echo reviewed  -CT gated study today   -Cardiology following    #HTN  -Home meds    #DM  -Hyperglycemia protocol    #Glaucoma  -Home eyedrops     #HLD   -Statin     Pj Young DO    Supplementary Documentation:     Quality:  DVT Mechanical Prophylaxis:   SCDs,    DVT Pharmacologic Prophylaxis   Medication    enoxaparin (Lovenox) 40 MG/0.4ML SUBQ injection 40 mg                Code Status: Not on file  Zhong: No urinary catheter in place  Zhong Duration (in days):   Central line:    LEANNE:     Discharge is dependent on: Clinical improvemenet   At this point Mr. Marks is expected to be discharge to: Home     The 21st Century Cures Act makes medical notes like these available to patients in the interest of transparency. Please be advised this is a medical document. Medical documents are intended to carry relevant information, facts as evident, and the clinical opinion of the practitioner. The medical note is intended as peer to peer communication and may appear blunt or direct. It is written in medical language and may contain abbreviations or verbiage that are unfamiliar.

## 2024-01-30 NOTE — IMAGING NOTE
Pt arrives to room CT 4 at 11:02. Working with CT tech HEATHER Weir. Pt denies long acting nitrates. Pt positioned on CT table comfortably. Procedure explained and questions answered. O2 applied via NC at 2 LPM. VSS as noted in flowsheet.     11:11 Notified Dr. MILAN Santos of 2nd degree . Dr. Santos present in CT for entire CTA Gated Coronary.    GFR = 66   imaging started at 11:15    0.9NS flush followed by Omnipaque contrast at 11:22    omnipaque contrast = 80 mL  0.9NS = 72 mL  Average HR = 56    Pt tolerated procedure without complication. Denies s/sx of contrast reaction. Pt A/O x 4 and denies pain. Ambulatory and in stable condition. Dc'd back to room 0025 at 11:23

## 2024-01-30 NOTE — PROGRESS NOTES
Cardiology Progress Note        Matthew Marks Patient Status:  Observation    1945 MRN [id number]   Location [facility] 0SW-A Attending Pj Young, DO   Hosp Day # 0 PCP Jonny Hansen MD     Subjective:  No cardiac issues.  Tele with intermittent bradycardia    Medications:   amLODIPine  10 mg Oral Daily    losartan  100 mg Oral Nightly    latanoprost  1 drop Both Eyes Nightly    pantoprazole  20 mg Oral QAM AC    pravastatin  10 mg Oral Nightly    enoxaparin  40 mg Subcutaneous Daily    metoprolol tartrate  50 mg Oral Once    Or    metoprolol tartrate  100 mg Oral Once    metoprolol tartrate  50 mg Oral Once    Or    metoprolol tartrate  100 mg Oral Once       Continuous Infusions:        Allergies:  Allergies   Allergen Reactions    Statins PAIN     Leg pain    Codeine UNKNOWN         Intake/Output:  No intake or output data in the 24 hours ending 24 1310        Last 3 Weights   24 0144 142 lb (64.4 kg)   24 2247 140 lb (63.5 kg)   23 1328 145 lb (65.8 kg)   23 1119 142 lb (64.4 kg)            Physical Exam:    Temp:  [97.7 °F (36.5 °C)-98.2 °F (36.8 °C)] 97.8 °F (36.6 °C)  Pulse:  [46-78] 50  Resp:  [16-18] 16  BP: (108-156)/() 108/64  SpO2:  [92 %-100 %] 96 %    General: No apparent distress  HEENT: No focal deficits.  Neck: supple. JVP normal  Cardiac: Regular rhythm, S1, S2 normal,  rub or gallop.  No murmur.  Lungs: CTA  Abdomen: Soft, non-tender.   Extremities: No edema  Neurologic: no focal deficits  Skin: Warm and dry.     Telemetry: reviewed    EKG:      Echo:      Cardiac Cath:      Labs:  HEM:  Recent Labs   Lab 248 24  0840   WBC 4.7 4.7   HGB 15.9 14.6   .0 234.0       Chem:  Recent Labs   Lab 248 24  0840    137   K 3.9 3.6    108   CO2 25.0 21.0   BUN 19 17   CREATSERUM 1.07 1.14   CA 9.8 9.3   * 171*       Recent Labs   Lab 24  2308   AST 27   ALB 4.3       No results for  input(s): \"TROP\", \"CK\" in the last 168 hours.    No results for input(s): \"PTP\", \"INR\" in the last 168 hours.              Impression:  Chest pressure - one hour duration, normal trop's.  -echo with preserved EF  -gated coronary CT angio with mild non obstructive CAD.  RBBB, new.  Labile BP's.  Asx sinus jean pierre, second degree AVB, seen in 2020 as well.  Diabetes.          Plan:  CV status is stable for dc.  Discussed at length with patient.        Rey Cleary MD  1/30/2024  1:10 PM  L3

## 2024-01-30 NOTE — IMAGING NOTE
Plan for CTA gated coronary  Pre-scan instructions reviewed with Roxann LIVINGSTON  No caffeine or stimulant drinks  HR close to 60 BPM, HR  protocol ordered by cardiology  GFR today  Needs 20G AC access with CT compatible tubing  Call Radiology nursing with questions at  [phone]

## 2024-01-30 NOTE — PLAN OF CARE
Patient A&O x4, room air, no c/o pain.  Metoprolol held this evening for HRs under 60, Losartan given for BP.  Patient aware of procedure, will be NPO.

## 2024-01-31 ENCOUNTER — PATIENT OUTREACH (OUTPATIENT)
Dept: CASE MANAGEMENT | Age: 79
End: 2024-01-31

## 2024-01-31 DIAGNOSIS — Z02.9 ENCOUNTERS FOR UNSPECIFIED ADMINISTRATIVE PURPOSE: Primary | ICD-10-CM

## 2024-01-31 PROCEDURE — 1111F DSCHRG MED/CURRENT MED MERGE: CPT

## 2024-01-31 NOTE — PAYOR COMM NOTE
Discharge Notification    Patient Name: LEFTY LAUREN  Payor: MADIHA MENDOZA [facility]  Subscriber #: [id number]  Authorization Number: [id number]  Admit Date/Time: 1/28/2024 10:48 PM  Discharge Date/Time: 1/30/2024 4:58 PM    OBSERVATION

## 2024-02-01 ENCOUNTER — TELEPHONE (OUTPATIENT)
Dept: INTERNAL MEDICINE CLINIC | Facility: CLINIC | Age: 79
End: 2024-02-01

## 2024-02-01 NOTE — TELEPHONE ENCOUNTER
Pt has Medicare Annual  appt on 2/6. Pt is in TCM. Please advise if appt should be changed to TCM or left as is. Thank you.

## 2024-02-01 NOTE — PROGRESS NOTES
LMTCB for post hospital follow up, NCM contact information provided.   [facility] also sent TE to PCP office regarding visit type.

## 2024-02-02 ENCOUNTER — LAB ENCOUNTER (OUTPATIENT)
Dept: LAB | Age: 79
End: 2024-02-02
Attending: INTERNAL MEDICINE
Payer: MEDICARE

## 2024-02-02 DIAGNOSIS — Z00.00 ROUTINE GENERAL MEDICAL EXAMINATION AT A HEALTH CARE FACILITY: ICD-10-CM

## 2024-02-02 DIAGNOSIS — E11.21 TYPE 2 DIABETES MELLITUS WITH DIABETIC NEPHROPATHY, WITHOUT LONG-TERM CURRENT USE OF INSULIN (HCC): ICD-10-CM

## 2024-02-02 DIAGNOSIS — E78.5 DYSLIPIDEMIA WITH ELEVATED LOW DENSITY LIPOPROTEIN (LDL) CHOLESTEROL AND ABNORMALLY LOW HIGH DENSITY LIPOPROTEIN CHOLESTEROL: ICD-10-CM

## 2024-02-02 DIAGNOSIS — I10 ESSENTIAL HYPERTENSION: ICD-10-CM

## 2024-02-02 LAB
ALBUMIN SERPL-MCNC: 3.8 G/DL (ref 3.4–5)
ALBUMIN/GLOB SERPL: 0.9 {RATIO} (ref 1–2)
ALP LIVER SERPL-CCNC: 72 U/L
ALT SERPL-CCNC: 49 U/L
ANION GAP SERPL CALC-SCNC: 5 MMOL/L (ref 0–18)
AST SERPL-CCNC: 51 U/L (ref 15–37)
BILIRUB SERPL-MCNC: 0.9 MG/DL (ref 0.1–2)
BUN BLD-MCNC: 20 MG/DL (ref 9–23)
CALCIUM BLD-MCNC: 9.2 MG/DL (ref 8.5–10.1)
CHLORIDE SERPL-SCNC: 106 MMOL/L (ref 98–112)
CHOLEST SERPL-MCNC: 164 MG/DL (ref ?–200)
CO2 SERPL-SCNC: 26 MMOL/L (ref 21–32)
CREAT BLD-MCNC: 1.23 MG/DL
CREAT UR-SCNC: 224 MG/DL
EGFRCR SERPLBLD CKD-EPI 2021: 60 ML/MIN/1.73M2 (ref 60–?)
EST. AVERAGE GLUCOSE BLD GHB EST-MCNC: 166 MG/DL (ref 68–126)
FASTING PATIENT LIPID ANSWER: YES
FASTING STATUS PATIENT QL REPORTED: YES
GLOBULIN PLAS-MCNC: 4.1 G/DL (ref 2.8–4.4)
GLUCOSE BLD-MCNC: 132 MG/DL (ref 70–99)
HBA1C MFR BLD: 7.4 % (ref ?–5.7)
HDLC SERPL-MCNC: 45 MG/DL (ref 40–59)
LDLC SERPL CALC-MCNC: 99 MG/DL (ref ?–100)
MICROALBUMIN UR-MCNC: 27.1 MG/DL
MICROALBUMIN/CREAT 24H UR-RTO: 121 UG/MG (ref ?–30)
NONHDLC SERPL-MCNC: 119 MG/DL (ref ?–130)
OSMOLALITY SERPL CALC.SUM OF ELEC: 288 MOSM/KG (ref 275–295)
POTASSIUM SERPL-SCNC: 4 MMOL/L (ref 3.5–5.1)
PROT SERPL-MCNC: 7.9 G/DL (ref 6.4–8.2)
SODIUM SERPL-SCNC: 137 MMOL/L (ref 136–145)
TRIGL SERPL-MCNC: 108 MG/DL (ref 30–149)
TSI SER-ACNC: 1.28 MIU/ML (ref 0.36–3.74)
VLDLC SERPL CALC-MCNC: 18 MG/DL (ref 0–30)

## 2024-02-02 PROCEDURE — 80053 COMPREHEN METABOLIC PANEL: CPT

## 2024-02-02 PROCEDURE — 82570 ASSAY OF URINE CREATININE: CPT

## 2024-02-02 PROCEDURE — 80061 LIPID PANEL: CPT

## 2024-02-02 PROCEDURE — 82043 UR ALBUMIN QUANTITATIVE: CPT

## 2024-02-02 PROCEDURE — 36415 COLL VENOUS BLD VENIPUNCTURE: CPT

## 2024-02-02 PROCEDURE — 83036 HEMOGLOBIN GLYCOSYLATED A1C: CPT

## 2024-02-02 PROCEDURE — 84443 ASSAY THYROID STIM HORMONE: CPT

## 2024-02-06 ENCOUNTER — OFFICE VISIT (OUTPATIENT)
Dept: INTERNAL MEDICINE CLINIC | Facility: CLINIC | Age: 79
End: 2024-02-06
Payer: MEDICARE

## 2024-02-06 VITALS
DIASTOLIC BLOOD PRESSURE: 80 MMHG | WEIGHT: 142 LBS | HEART RATE: 60 BPM | BODY MASS INDEX: 22.82 KG/M2 | RESPIRATION RATE: 21 BRPM | OXYGEN SATURATION: 98 % | HEIGHT: 66 IN | TEMPERATURE: 97 F | SYSTOLIC BLOOD PRESSURE: 120 MMHG

## 2024-02-06 DIAGNOSIS — I10 ESSENTIAL HYPERTENSION: ICD-10-CM

## 2024-02-06 DIAGNOSIS — K21.9 GASTROESOPHAGEAL REFLUX DISEASE, UNSPECIFIED WHETHER ESOPHAGITIS PRESENT: ICD-10-CM

## 2024-02-06 DIAGNOSIS — E11.29 TYPE 2 DIABETES MELLITUS WITH MICROALBUMINURIA, WITHOUT LONG-TERM CURRENT USE OF INSULIN  (HCC): ICD-10-CM

## 2024-02-06 DIAGNOSIS — R80.9 TYPE 2 DIABETES MELLITUS WITH MICROALBUMINURIA, WITHOUT LONG-TERM CURRENT USE OF INSULIN  (HCC): ICD-10-CM

## 2024-02-06 DIAGNOSIS — R07.89 LEFT-SIDED CHEST WALL PAIN: Primary | ICD-10-CM

## 2024-02-06 PROCEDURE — 99496 TRANSJ CARE MGMT HIGH F2F 7D: CPT | Performed by: INTERNAL MEDICINE

## 2024-02-06 PROCEDURE — 1159F MED LIST DOCD IN RCRD: CPT | Performed by: INTERNAL MEDICINE

## 2024-02-06 PROCEDURE — 1170F FXNL STATUS ASSESSED: CPT | Performed by: INTERNAL MEDICINE

## 2024-02-06 PROCEDURE — 1111F DSCHRG MED/CURRENT MED MERGE: CPT | Performed by: INTERNAL MEDICINE

## 2024-02-06 PROCEDURE — 1160F RVW MEDS BY RX/DR IN RCRD: CPT | Performed by: INTERNAL MEDICINE

## 2024-02-06 NOTE — PROGRESS NOTES
Multiple attempts to reach pt and messages left with no return call.  Patient went in for HFU appt with PCP on 2/6/24.  Encounter closing.

## 2024-02-06 NOTE — PROGRESS NOTES
Subjective:   Matthew Marks is a 78 year old male who presents for hospital follow up.   He was discharged from [facility] to Home or Self Care  Admission Date: 1/28/24   Discharge Date: 1/30/24  Hospital Discharge Diagnosis: Chest pain, rule out ACS    Interactive contact within 2 business days post discharge first initiated on Date: 1/31/2024    During the visit, the following was completed:  Obtained and reviewed discharge summary, continuity of care documents, and Hospitalist notes  Reviewed Labs (CBC, CMP), Labs (Cardiac markers), EKG, and Echocardiogram    HPI:     The patient experienced a resting left-sided chest pain following consumption of spicy foods and caffeinated tea, described as a \"moving\" tightness without associated shortness of breath, diaphoresis, or nausea. He was driven to the ED via private vehicle, after which his symptoms improved spontaneously. He underwent evaluation by the cardiology service, telemetry, ECG, serial troponin, 2D echo, and cardiac CT without significant abnormality. His symptoms were attributed to a MSK etiology vs GERD. His symptoms have since not recurred. On further discussion the patient reports having undergone pushups the morning of symptoms, with straining. His symptoms were reproduced on palpation of the affected area.     History/Other:   Current Medications:  Medication Reconciliation:  I am aware of an inpatient discharge within the last 30 days.  The discharge medication list has been reconciled with the patient's current medication list and reviewed by me.  See medication list for additions of new medication, and changes to current doses of medications and discontinued medications.  Outpatient Medications Marked as Taking for the 2/6/24 encounter (Office Visit) with Jonny Hansen MD   Medication Sig    aspirin 81 MG Oral Tab EC Take 1 tablet (81 mg total) by mouth daily.    timolol 0.5 % Ophthalmic Solution Place 1 drop into both eyes daily.    Glucose  Blood (ACCU-CHEK PRATIMA PLUS) In Vitro Strip CHECK BLOOD GLUCOSE TWO TIMES DAILY    PRAVASTATIN 10 MG Oral Tab TAKE 1 TABLET EVERY NIGHT    AMLODIPINE 10 MG Oral Tab TAKE 1 TABLET EVERY DAY    IRBESARTAN 300 MG Oral Tab TAKE 1 TABLET EVERY NIGHT    OMEPRAZOLE 20 MG Oral Capsule Delayed Release TAKE 1 CAPSULE (20 MG TOTAL) BY MOUTH BEFORE BREAKFAST.    METFORMIN  MG Oral Tablet 24 Hr TAKE 1 TABLET EVERY NIGHT    budesonide 0.5 MG/2ML Inhalation Suspension Mix 2 ml (0.5 mg) in honey or apple sauce and swallow twice daily.  Rinse your mouth after taking and spit.  Do not eat or drink for 30 minutes after use.    empagliflozin (JARDIANCE) 10 MG Oral Tab Take 1 tablet (10 mg total) by mouth daily.    LATANOPROST 0.005 % Ophthalmic Solution INSTILL 1 DROP IN BOTH EYES EVERY NIGHT    Blood Glucose Calibration (ACCU-CHEK PRATIMA) In Vitro Solution     Accu-Chek Softclix Lancets Does not apply Misc     Lancets Misc. (ACCU-CHEK SOFTCLIX LANCET DEV) Does not apply Kit        Review of Systems  Constitutional: negative  Eyes: negative  Ears, nose, mouth, throat, and face: negative  Respiratory: negative  Cardiovascular: negative  Gastrointestinal: negative  Genitourinary:negative  Integument/breast: negative  Hematologic/lymphatic: negative  Musculoskeletal:negative  Neurological: negative  Behavioral/Psych: negative  Endocrine: negative  Allergic/Immunologic: negative    Objective:   Physical Exam  General Appearance:  Alert, cooperative, no distress, appears stated age   Head:  Normocephalic, without obvious abnormality, atraumatic   Eyes:  BL conjunctiva WNL   Ears:  TM WNL BL   Nose: Deferred   Throat: Deferred   Neck: Supple, symmetrical, trachea midline, no adenopathy, thyroid: not enlarged, symmetric, no tenderness/mass/nodules, no carotid bruit or JVD   Back:   Symmetric, no curvature, ROM normal, no CVA tenderness   Lungs:   Clear to auscultation bilaterally, respirations unlabored   Chest Wall:  No tenderness or  deformity   Heart:  Regular rate and rhythm, S1, S2 normal, no murmur, rub or gallop   Abdomen:   Soft, non-tender, bowel sounds active all four quadrants,  no masses, no organomegaly   Genitalia: Deferred   Rectal: Deferred   Extremities: No edema. No chest wall tenderness.   Pulses: 2+ and symmetric   Skin: Skin color, texture, turgor normal, no rashes or lesions   Lymph nodes: Deferred   Neurologic: Grossly normal     /80   Pulse 60   Temp 97.3 °F (36.3 °C) (Temporal)   Resp 21   Ht 5' 6\" (1.676 m)   Wt 142 lb (64.4 kg)   SpO2 98%   BMI 22.92 kg/m²  Estimated body mass index is 22.92 kg/m² as calculated from the following:    Height as of this encounter: 5' 6\" (1.676 m).    Weight as of this encounter: 142 lb (64.4 kg).    Assessment & Plan:   1. Left-sided chest wall pain (Primary)  Chest pain:  Inducible on palpation at the time of symptoms; suspected MSK from morning \"straining\" workout  Unrevealing cardiac CT, 2D echocardiogram, ECG, serial troponin, and telemetry evaluation  Not recurrent  Continue risk factor reduction    Hypertension:  Stable and controlled  Continue current management    DM2:  Recent elevation in blood glucose levels from increased consumption of rice; dietary management reinforced    GERD:  Stable and controlled  Continue current ppi therapy      Return in 6 months (on 8/6/2024).

## 2024-02-07 NOTE — DISCHARGE SUMMARY
[facility]     Matthew Marks Patient Status:  Observation    1945 MRN [id number]   Location [facility] 0SW-A Attending No att. providers found   Hosp Day # 0 PCP Jonny Hansen MD     Date of Admission: 2024  Date of Discharge:  2024     Discharge Disposition: Home or Self Care    Discharge Diagnosis:  #Acute chest pain  #HTN  #DM  #Glaucoma  #HLD  #GERD    History of Present Illness: Matthew Marks is a 78 year old male with history of hypertension, type 2 diabetes, hyperlipidemia, GERD presents emergency room with chest pain.  Patient describes as a tightness that is in the left side of his chest.  Patient says the pain started around 830 this evening while sitting at home.  Patient states that the pain rated 8 out of 10 radiating to the left shoulder and across the upper chest.  Patient states the pain was constant and only after coming to the ER is slowly starting to improve.  Patient denies any nausea, vomiting, shortness of breath, palpitations.  No cough, diaphoresis.        Brief Synopsis: Cardiology was consulted. Patient underwent Echo and gated coronary CT angio which showed mild non obstructive CAD. Patient was discharged home with outpatient follow up.    Lace+ Score: 61  59-90 High Risk  29-58 Medium Risk  0-28   Low Risk       TCM Follow-Up Recommendation:  LACE > 58: High Risk of readmission after discharge from the hospital.      Procedures during hospitalization:   None    Incidental or significant findings and recommendations (brief descriptions):  See brief synopsis above     Lab/Test results pending at Discharge:   None    Consultants:  Cardiology    Discharge Medication List:     Discharge Medications        CONTINUE taking these medications        Instructions Prescription details   Accu-Chek Isadora Plus Strp      CHECK BLOOD GLUCOSE TWO TIMES DAILY   Quantity: 200 strip  Refills: 3     Accu-Chek Isadora Soln       Refills: 0      Accu-Chek Softclix Lancet Dev Kit       Refills: 0     Accu-Chek Softclix Lancets Misc       Refills: 0     amLODIPine 10 MG Tabs  Commonly known as: Norvasc      TAKE 1 TABLET EVERY DAY   Quantity: 90 tablet  Refills: 10     aspirin 81 MG Tbec      Take 1 tablet (81 mg total) by mouth daily.   Refills: 0     budesonide 0.5 MG/2ML Susp  Commonly known as: Pulmicort      Mix 2 ml (0.5 mg) in honey or apple sauce and swallow twice daily.  Rinse your mouth after taking and spit.  Do not eat or drink for 30 minutes after use.   Quantity: 180 each  Refills: 3     empagliflozin 10 MG Tabs  Commonly known as: Jardiance      Take 1 tablet (10 mg total) by mouth daily.   Quantity: 90 tablet  Refills: 0     Irbesartan 300 MG Tabs      TAKE 1 TABLET EVERY NIGHT   Quantity: 90 tablet  Refills: 10     latanoprost 0.005 % Soln  Commonly known as: Xalatan      INSTILL 1 DROP IN BOTH EYES EVERY NIGHT   Quantity: 5 mL  Refills: 0     metFORMIN  MG Tb24  Commonly known as: Glucophage XR      TAKE 1 TABLET EVERY NIGHT   Quantity: 90 tablet  Refills: 10     omeprazole 20 MG Cpdr  Commonly known as: PriLOSEC      TAKE 1 CAPSULE (20 MG TOTAL) BY MOUTH BEFORE BREAKFAST.   Quantity: 90 capsule  Refills: 10     pravastatin 10 MG Tabs  Commonly known as: Pravachol      TAKE 1 TABLET EVERY NIGHT   Quantity: 90 tablet  Refills: 10     timolol 0.5 % Soln  Commonly known as: Timoptic      Place 1 drop into both eyes daily.   Refills: 0            STOP taking these medications      amoxicillin clavulanate 875-125 MG Tabs  Commonly known as: Augmentin                 [place]    Follow-up appointment:   Jonny Hansen MD  [address] [place] [id number]  [phone]    Follow up in 1 week(s)      Appointments for Next 30 Days 2/7/2024 - 3/8/2024      None            Vital signs:       Physical Exam:    General: No acute distress   Lungs: clear to auscultation  Cardiovascular: S1, S2  Abdomen:  Soft    -----------------------------------------------------------------------------------------------  PATIENT DISCHARGE INSTRUCTIONS: See electronic chart    Pj Young,     Total time spent on discharge plannin minutes     The  Cent[employer] Cures Act makes medical notes like these available to patients in the interest of transparency. Please be advised this is a medical document. Medical documents are intended to carry relevant information, facts as evident, and the clinical opinion of the practitioner. The medical note is intended as peer to peer communication and may appear blunt or direct. It is written in medical language and may contain abbreviations or verbiage that are unfamiliar.

## 2024-02-22 ENCOUNTER — OFFICE VISIT (OUTPATIENT)
Dept: AUDIOLOGY | Facility: CLINIC | Age: 79
End: 2024-02-22

## 2024-02-22 ENCOUNTER — OFFICE VISIT (OUTPATIENT)
Dept: OTOLARYNGOLOGY | Facility: CLINIC | Age: 79
End: 2024-02-22
Payer: MEDICARE

## 2024-02-22 VITALS — BODY MASS INDEX: 21.97 KG/M2 | TEMPERATURE: 98 F | HEIGHT: 67 IN | WEIGHT: 140 LBS

## 2024-02-22 DIAGNOSIS — H93.19 TINNITUS, UNSPECIFIED LATERALITY: Primary | ICD-10-CM

## 2024-02-22 DIAGNOSIS — H93.13 TINNITUS OF BOTH EARS: Primary | ICD-10-CM

## 2024-02-22 DIAGNOSIS — H91.13 PRESBYCUSIS OF BOTH EARS: ICD-10-CM

## 2024-02-22 PROCEDURE — 1111F DSCHRG MED/CURRENT MED MERGE: CPT | Performed by: SPECIALIST

## 2024-02-22 PROCEDURE — 1111F DSCHRG MED/CURRENT MED MERGE: CPT | Performed by: AUDIOLOGIST

## 2024-02-22 PROCEDURE — 1160F RVW MEDS BY RX/DR IN RCRD: CPT | Performed by: SPECIALIST

## 2024-02-22 PROCEDURE — 1159F MED LIST DOCD IN RCRD: CPT | Performed by: AUDIOLOGIST

## 2024-02-22 PROCEDURE — 1126F AMNT PAIN NOTED NONE PRSNT: CPT | Performed by: SPECIALIST

## 2024-02-22 PROCEDURE — 3008F BODY MASS INDEX DOCD: CPT | Performed by: SPECIALIST

## 2024-02-22 PROCEDURE — 99203 OFFICE O/P NEW LOW 30 MIN: CPT | Performed by: SPECIALIST

## 2024-02-22 PROCEDURE — 92567 TYMPANOMETRY: CPT | Performed by: AUDIOLOGIST

## 2024-02-22 PROCEDURE — 92557 COMPREHENSIVE HEARING TEST: CPT | Performed by: AUDIOLOGIST

## 2024-02-22 PROCEDURE — 1159F MED LIST DOCD IN RCRD: CPT | Performed by: SPECIALIST

## 2024-02-23 NOTE — PATIENT INSTRUCTIONS
Reviewed your audiogram which shows normal hearing until the high frequencies at which point to have a moderate to severe sensorineural hearing loss.  Your word discrimination score is good to excellent at 92% bilaterally.  For the tinnitus, you can reduce your sodium and caffeine.  You could also try Lipoflavonoid.  For bedtime you can try a tinnitus relaxing music.  Call or follow-up with any additional questions or problems.

## 2024-02-23 NOTE — PROGRESS NOTES
Matthew Marks is a 78 year old male.   Chief Complaint   Patient presents with    Ear Problem     Tinnitus for the past month, worse at night.     HPI:   Patient reports right tinnitus worse when he is trying to go to sleep.    Current Outpatient Medications   Medication Sig Dispense Refill    timolol 0.5 % Ophthalmic Solution Place 1 drop into both eyes daily.      Glucose Blood (ACCU-CHEK PRATIMA PLUS) In Vitro Strip CHECK BLOOD GLUCOSE TWO TIMES DAILY 200 strip 3    AMLODIPINE 10 MG Oral Tab TAKE 1 TABLET EVERY DAY 90 tablet 10    IRBESARTAN 300 MG Oral Tab TAKE 1 TABLET EVERY NIGHT 90 tablet 10    OMEPRAZOLE 20 MG Oral Capsule Delayed Release TAKE 1 CAPSULE (20 MG TOTAL) BY MOUTH BEFORE BREAKFAST. 90 capsule 10    METFORMIN  MG Oral Tablet 24 Hr TAKE 1 TABLET EVERY NIGHT 90 tablet 10    budesonide 0.5 MG/2ML Inhalation Suspension Mix 2 ml (0.5 mg) in honey or apple sauce and swallow twice daily.  Rinse your mouth after taking and spit.  Do not eat or drink for 30 minutes after use. 180 each 3    LATANOPROST 0.005 % Ophthalmic Solution INSTILL 1 DROP IN BOTH EYES EVERY NIGHT 5 mL 0    Blood Glucose Calibration (ACCU-CHEK PRATIMA) In Vitro Solution       Accu-Chek Softclix Lancets Does not apply Misc       Lancets Misc. (ACCU-CHEK SOFTCLIX LANCET DEV) Does not apply Kit       aspirin 81 MG Oral Tab EC Take 1 tablet (81 mg total) by mouth daily. (Patient not taking: Reported on 2/22/2024)      PRAVASTATIN 10 MG Oral Tab TAKE 1 TABLET EVERY NIGHT (Patient not taking: Reported on 2/22/2024) 90 tablet 10    empagliflozin (JARDIANCE) 10 MG Oral Tab Take 1 tablet (10 mg total) by mouth daily. 90 tablet 0      Past Medical History:   Diagnosis Date    Constipation hardnes    Diabetes ([facility])     Diabetic eye exam ([facility]) 06/30/2020    [facility]    High blood pressure     Osteoporosis 2010    Unspecified essential hypertension       Social History:  Social History     Socioeconomic History    Marital  status:    Tobacco Use    Smoking status: Former     Years: 2     Types: Cigarettes     Quit date: 1976     Years since quittin.7    Smokeless tobacco: Never   Vaping Use    Vaping Use: Never used   Substance and Sexual Activity    Alcohol use: Yes     Alcohol/week: 2.0 standard drinks of alcohol     Types: 2 Glasses of wine per week     Comment: once in a month    Drug use: No   Other Topics Concern    Caffeine Concern Yes    Stress Concern No    Weight Concern No    Special Diet No    Exercise Yes    Seat Belt Yes    Reaction to local anesthetic No     Social Determinants of Health     Food Insecurity: No Food Insecurity (2024)    Food Insecurity     Food Insecurity: Never true   Transportation Needs: No Transportation Needs (2024)    Transportation Needs     Lack of Transportation: No   Housing Stability: Low Risk  (2024)    Housing Stability     Housing Instability: No        REVIEW OF SYSTEMS:   GENERAL HEALTH: feels well otherwise  GENERAL : denies fever, chills, sweats, weight loss, weight gain  SKIN: denies any unusual skin lesions or rashes  RESPIRATORY: denies shortness of breath with exertion  NEURO: denies headaches    EXAM:   Temp 97.6 °F (36.4 °C) (Tympanic)   Ht 5' 7\" (1.702 m)   Wt 140 lb (63.5 kg)   BMI 21.93 kg/m²   System Details   Skin Inspection - Normal.   Constitutional Overall appearance - Normal.   Head/Face Facial features - Normal. Eyebrows - Normal. Skull - Normal.   Eyes Conjunctiva - Right: Normal, Left: Normal. Pupil - Right: Normal, Left: Normal.    Ears Inspection - Right: Normal, Left: Normal.   Canal - Right: Normal, Left: Normal.   TM - Right: Normal, Left: Normal.   Nasal External nose - Normal.   Nasal septum - Normal.  Turbinates - Normal.   Oral/Oropharynx Lips - Normal, Tonsils - Normal, Tongue - Normal    Neck Exam Inspection - Normal. Palpation - Normal. Parotid gland - Normal. Thyroid gland - Normal.  Carotid bruits by auscultation    Lymph Detail Submental. Submandibular. Anterior cervical. Posterior cervical. Supraclavicular all without enlargement   Psychiatric Orientation - Oriented to time, place, person & situation. Appropriate mood and affect.   Neurological Memory - Normal. Cranial nerves - Cranial nerves II through XII grossly intact.     ASSESSMENT AND PLAN:   1. Tinnitus, unspecified laterality  Audiogram shows moderate to severe high-frequency sensorineural hearing loss which is slightly worse on the left than the right although tinnitus is worse right than left.  Patient to reduce salt and caffeine.  Can also try Lipoflavonoid.  For bothersome tinnitus when you are trying to sleep, a tinnitus relaxing music can be considered.  - Audiology Referral - [place] ([facility])    2. Presbycusis of both ears  Declan reviewed with patient and wife at the time of the visit.  They were also given a copy of the audiogram.      The patient indicates understanding of these issues and agrees to the plan.      Kasie Field MD  2/22/2024  9:27 PM

## 2024-03-10 ENCOUNTER — TELEPHONE (OUTPATIENT)
Dept: INTERNAL MEDICINE CLINIC | Facility: CLINIC | Age: 79
End: 2024-03-10

## 2024-03-10 DIAGNOSIS — E11.9 TYPE 2 DIABETES MELLITUS WITHOUT COMPLICATION, WITHOUT LONG-TERM CURRENT USE OF INSULIN (HCC): ICD-10-CM

## 2024-03-10 DIAGNOSIS — Z00.00 ROUTINE GENERAL MEDICAL EXAMINATION AT A HEALTH CARE FACILITY: Primary | ICD-10-CM

## 2024-05-20 ENCOUNTER — LAB ENCOUNTER (OUTPATIENT)
Dept: LAB | Age: 79
End: 2024-05-20
Attending: INTERNAL MEDICINE

## 2024-05-20 DIAGNOSIS — E11.9 TYPE 2 DIABETES MELLITUS WITHOUT COMPLICATION, WITHOUT LONG-TERM CURRENT USE OF INSULIN (HCC): ICD-10-CM

## 2024-05-20 DIAGNOSIS — Z00.00 ROUTINE GENERAL MEDICAL EXAMINATION AT A HEALTH CARE FACILITY: ICD-10-CM

## 2024-05-20 LAB
ALBUMIN SERPL-MCNC: 4.2 G/DL (ref 3.4–5)
ALBUMIN/GLOB SERPL: 1.1 {RATIO} (ref 1–2)
ALP LIVER SERPL-CCNC: 60 U/L
ALT SERPL-CCNC: 20 U/L
ANION GAP SERPL CALC-SCNC: 4 MMOL/L (ref 0–18)
AST SERPL-CCNC: 13 U/L (ref 15–37)
BASOPHILS # BLD AUTO: 0.04 X10(3) UL (ref 0–0.2)
BASOPHILS NFR BLD AUTO: 0.9 %
BILIRUB SERPL-MCNC: 1 MG/DL (ref 0.1–2)
BUN BLD-MCNC: 17 MG/DL (ref 9–23)
CALCIUM BLD-MCNC: 9.3 MG/DL (ref 8.5–10.1)
CHLORIDE SERPL-SCNC: 105 MMOL/L (ref 98–112)
CHOLEST SERPL-MCNC: 200 MG/DL (ref ?–200)
CO2 SERPL-SCNC: 28 MMOL/L (ref 21–32)
CREAT BLD-MCNC: 0.94 MG/DL
EGFRCR SERPLBLD CKD-EPI 2021: 83 ML/MIN/1.73M2 (ref 60–?)
EOSINOPHIL # BLD AUTO: 0.46 X10(3) UL (ref 0–0.7)
EOSINOPHIL NFR BLD AUTO: 10.3 %
ERYTHROCYTE [DISTWIDTH] IN BLOOD BY AUTOMATED COUNT: 11.9 %
EST. AVERAGE GLUCOSE BLD GHB EST-MCNC: 160 MG/DL (ref 68–126)
FASTING PATIENT LIPID ANSWER: YES
FASTING STATUS PATIENT QL REPORTED: YES
GLOBULIN PLAS-MCNC: 3.8 G/DL (ref 2.8–4.4)
GLUCOSE BLD-MCNC: 130 MG/DL (ref 70–99)
HBA1C MFR BLD: 7.2 % (ref ?–5.7)
HCT VFR BLD AUTO: 43.5 %
HDLC SERPL-MCNC: 49 MG/DL (ref 40–59)
HGB BLD-MCNC: 15 G/DL
IMM GRANULOCYTES # BLD AUTO: 0.01 X10(3) UL (ref 0–1)
IMM GRANULOCYTES NFR BLD: 0.2 %
LDLC SERPL CALC-MCNC: 119 MG/DL (ref ?–100)
LYMPHOCYTES # BLD AUTO: 1.92 X10(3) UL (ref 1–4)
LYMPHOCYTES NFR BLD AUTO: 42.9 %
MCH RBC QN AUTO: 31.2 PG (ref 26–34)
MCHC RBC AUTO-ENTMCNC: 34.5 G/DL (ref 31–37)
MCV RBC AUTO: 90.4 FL
MONOCYTES # BLD AUTO: 0.38 X10(3) UL (ref 0.1–1)
MONOCYTES NFR BLD AUTO: 8.5 %
NEUTROPHILS # BLD AUTO: 1.67 X10 (3) UL (ref 1.5–7.7)
NEUTROPHILS # BLD AUTO: 1.67 X10(3) UL (ref 1.5–7.7)
NEUTROPHILS NFR BLD AUTO: 37.2 %
NONHDLC SERPL-MCNC: 151 MG/DL (ref ?–130)
OSMOLALITY SERPL CALC.SUM OF ELEC: 287 MOSM/KG (ref 275–295)
PLATELET # BLD AUTO: 215 10(3)UL (ref 150–450)
POTASSIUM SERPL-SCNC: 4.5 MMOL/L (ref 3.5–5.1)
PROT SERPL-MCNC: 8 G/DL (ref 6.4–8.2)
RBC # BLD AUTO: 4.81 X10(6)UL
SODIUM SERPL-SCNC: 137 MMOL/L (ref 136–145)
TRIGL SERPL-MCNC: 180 MG/DL (ref 30–149)
VLDLC SERPL CALC-MCNC: 32 MG/DL (ref 0–30)
WBC # BLD AUTO: 4.5 X10(3) UL (ref 4–11)

## 2024-05-20 PROCEDURE — 83036 HEMOGLOBIN GLYCOSYLATED A1C: CPT

## 2024-05-20 PROCEDURE — 85025 COMPLETE CBC W/AUTO DIFF WBC: CPT

## 2024-05-20 PROCEDURE — 80061 LIPID PANEL: CPT

## 2024-05-20 PROCEDURE — 80053 COMPREHEN METABOLIC PANEL: CPT

## 2024-05-20 PROCEDURE — 36415 COLL VENOUS BLD VENIPUNCTURE: CPT

## 2024-06-03 ENCOUNTER — OFFICE VISIT (OUTPATIENT)
Dept: INTERNAL MEDICINE CLINIC | Facility: CLINIC | Age: 79
End: 2024-06-03
Payer: MEDICARE

## 2024-06-03 VITALS
WEIGHT: 143.81 LBS | SYSTOLIC BLOOD PRESSURE: 136 MMHG | DIASTOLIC BLOOD PRESSURE: 88 MMHG | RESPIRATION RATE: 16 BRPM | TEMPERATURE: 98 F | BODY MASS INDEX: 21.79 KG/M2 | OXYGEN SATURATION: 98 % | HEART RATE: 65 BPM | HEIGHT: 68 IN

## 2024-06-03 DIAGNOSIS — H40.1134 PRIMARY OPEN ANGLE GLAUCOMA (POAG) OF BOTH EYES, INDETERMINATE STAGE: Chronic | ICD-10-CM

## 2024-06-03 DIAGNOSIS — I10 ESSENTIAL HYPERTENSION: ICD-10-CM

## 2024-06-03 DIAGNOSIS — M85.89 OSTEOPENIA OF MULTIPLE SITES: ICD-10-CM

## 2024-06-03 DIAGNOSIS — E11.21 DIABETIC NEPHROPATHY ASSOCIATED WITH TYPE 2 DIABETES MELLITUS (HCC): ICD-10-CM

## 2024-06-03 DIAGNOSIS — R80.9 TYPE 2 DIABETES MELLITUS WITH MICROALBUMINURIA, WITHOUT LONG-TERM CURRENT USE OF INSULIN (HCC): ICD-10-CM

## 2024-06-03 DIAGNOSIS — Z00.00 ENCOUNTER FOR ANNUAL HEALTH EXAMINATION: Primary | ICD-10-CM

## 2024-06-03 DIAGNOSIS — E11.29 TYPE 2 DIABETES MELLITUS WITH MICROALBUMINURIA, WITHOUT LONG-TERM CURRENT USE OF INSULIN (HCC): ICD-10-CM

## 2024-06-03 DIAGNOSIS — H93.13 TINNITUS OF BOTH EARS: ICD-10-CM

## 2024-06-03 DIAGNOSIS — L80 VITILIGO: ICD-10-CM

## 2024-06-03 DIAGNOSIS — K20.80 LYMPHOCYTIC ESOPHAGITIS: ICD-10-CM

## 2024-06-03 DIAGNOSIS — E78.2 MIXED HYPERLIPIDEMIA: ICD-10-CM

## 2024-06-03 DIAGNOSIS — R93.1 AGATSTON CAC SCORE 100-199: ICD-10-CM

## 2024-06-03 PROBLEM — K64.4 EXTERNAL HEMORRHOID: Status: RESOLVED | Noted: 2023-01-24 | Resolved: 2024-06-03

## 2024-06-03 PROBLEM — I77.1 TORTUOUS AORTA: Status: RESOLVED | Noted: 2017-04-24 | Resolved: 2024-06-03

## 2024-06-03 PROBLEM — I44.0 1ST DEGREE AV BLOCK: Status: RESOLVED | Noted: 2020-07-30 | Resolved: 2024-06-03

## 2024-06-03 PROBLEM — L30.9 DERMATITIS: Status: RESOLVED | Noted: 2021-05-27 | Resolved: 2024-06-03

## 2024-06-03 PROBLEM — K14.6 BURNING MOUTH SYNDROME: Status: RESOLVED | Noted: 2021-05-27 | Resolved: 2024-06-03

## 2024-06-03 PROBLEM — R07.9 CHEST PAIN, RULE OUT ACUTE MYOCARDIAL INFARCTION: Status: RESOLVED | Noted: 2024-01-29 | Resolved: 2024-06-03

## 2024-06-03 PROBLEM — N18.2 STAGE 2 CHRONIC KIDNEY DISEASE: Status: RESOLVED | Noted: 2021-05-03 | Resolved: 2024-06-03

## 2024-06-03 PROBLEM — I77.1 TORTUOUS AORTA (HCC): Status: RESOLVED | Noted: 2017-04-24 | Resolved: 2024-06-03

## 2024-06-03 RX ORDER — BLOOD-GLUCOSE METER
1 EACH MISCELLANEOUS 2 TIMES DAILY
Qty: 1 KIT | Refills: 0 | Status: SHIPPED | OUTPATIENT
Start: 2024-06-03 | End: 2025-06-03

## 2024-06-03 RX ORDER — METFORMIN HYDROCHLORIDE 500 MG/1
500 TABLET, EXTENDED RELEASE ORAL 2 TIMES DAILY WITH MEALS
Qty: 180 TABLET | Refills: 3 | Status: SHIPPED | OUTPATIENT
Start: 2024-06-03 | End: 2024-09-01

## 2024-06-03 NOTE — PATIENT INSTRUCTIONS
Matthew Marks's SCREENING SCHEDULE   Tests on this list are recommended by your physician but may not be covered, or covered at this frequency, by your insurer.   Please check with your insurance carrier before scheduling to verify coverage.   PREVENTATIVE SERVICES FREQUENCY &  COVERAGE DETAILS LAST COMPLETION DATE   Diabetes Screening    Fasting Blood Sugar / Glucose    One screening every 12 months if never tested or if previously tested but not diagnosed with pre-diabetes   One screening every 6 months if diagnosed with pre-diabetes Lab Results   Component Value Date     (H) 05/20/2024        Cardiovascular Disease Screening    Lipid Panel  Cholesterol  Lipoprotein (HDL)  Triglycerides Covered every 5 years for all Medicare beneficiaries without apparent signs or symptoms of cardiovascular disease Lab Results   Component Value Date    CHOLEST 200 (H) 05/20/2024    HDL 49 05/20/2024     (H) 05/20/2024    TRIG 180 (H) 05/20/2024         Electrocardiogram (EKG)   Covered if needed at Welcome to Medicare, and non-screening if indicated for medical reasons 01/29/2024      Ultrasound Screening for Abdominal Aortic Aneurysm (AAA) Covered once in a lifetime for one of the following risk factors   • Men who are 65-75 years old and have ever smoked   • Anyone with a family history -     Colorectal Cancer Screening  Covered for ages 50-85; only need ONE of the following:    Colonoscopy   Covered every 10 years    Covered every 2 years if patient is at high risk or previous colonoscopy was abnormal -    No recommendations at this time    Flexible Sigmoidoscopy   Covered every 4 years -    Fecal Occult Blood Test Covered annually -   Prostate Cancer Screening    Prostate-Specific Antigen (PSA) Annually Lab Results   Component Value Date    PSA 0.7 08/02/2017     There are no preventive care reminders to display for this patient.   Immunizations    Influenza Covered once per flu season  Please get every  year 11/29/2023  No recommendations at this time    Pneumococcal Each vaccine ([id number] & [id number]) covered once after 65 Prevnar 13: 03/08/2016    [id number]: 04/19/2018     No recommendations at this time    Hepatitis B One screening covered for patients with certain risk factors   -  No recommendations at this time    Tetanus Toxoid Not covered by Medicare Part B unless medically necessary (cut with metal); may be covered with your pharmacy prescription benefits -    Tetanus, Diptheria and Pertusis TD and TDaP Not covered by Medicare Part B -  No recommendations at this time    Zoster Not covered by Medicare Part B; may be covered with your pharmacy  prescription benefits -  Zoster Vaccines(1 of 2) Never done     Diabetes      Hemoglobin A1C Annually; if last result is elevated, may be asked to retest more frequently.    Medicare covers every 3 months Lab Results   Component Value Date     (H) 05/20/2024    A1C 7.2 (H) 05/20/2024       No recommendations at this time    Creat/alb ratio Annually Lab Results   Component Value Date    MICROALBCREA 121.0 (H) 02/02/2024       LDL Annually Lab Results   Component Value Date     (H) 05/20/2024       Dilated Eye Exam Annually [unfilled]     Annual Monitoring of Persistent Medications (ACE/ARB, digoxin diuretics, anticonvulsants)    Potassium Annually Lab Results   Component Value Date    K 4.5 05/20/2024         Creatinine   Annually Lab Results   Component Value Date    CREATSERUM 0.94 05/20/2024         BUN Annually Lab Results   Component Value Date    BUN 17 05/20/2024       Drug Serum Conc Annually No results found for: \"DIGOXIN\", \"DIG\", \"VALP\"

## 2024-06-03 NOTE — PROGRESS NOTES
Subjective:   Matthew Marks is a 78 year old male who presents for a Medicare Subsequent Annual Wellness visit (Pt already had Initial Annual Wellness) and scheduled follow up of multiple significant but stable problems.     Since last evaluation the patient has made efforts to maintain favorable dietary and lifestyle habits.  He has made improvements with his dietary habits, including reducing consumption of rice.  Hemoglobin A1c has improved from 7.4% to 7.2%.  There is evidence of microalbuminuria.  He reports some left-sided left lower extremity pain that is worse with prolonged weightbearing and ambulation, and while lying on the affected side overnight.  He also reports a chronic right heel pain that has improved following changing his footwear to that of which is more supportive.  His symptoms are primarily worse following prolonged periods of nonweightbearing and initial weightbearing, and improves as he walks.    History/Other:   Fall Risk Assessment:   He has been screened for Falls and is low risk.      Cognitive Assessment:   Abnormal  What day of the week is this?: Correct  What month is it?: Correct  What year is it?: Correct  Recall \"Ball\": Correct  Recall \"Flag\": Incorrect  Recall \"Tree\": Correct  MMSE SCORE: 25.5    Functional Ability/Status:   Matthew Marks has some abnormal functions as listed below:  He has Vision problems based on screening of functional status. He has problems with Memory based on screening of functional status.       Depression Screening (PHQ-2/PHQ-9): PHQ-2 SCORE: 0  , done 5/26/2024        5 minutes spent screening and counseling for depression    Advanced Directives:   He does have a Living Will but we do NOT have it on file in Epic.    He does have a POA but we do NOT have it on file in Epic.    Discussed Advance Care Planning with patient (and family/surrogate if present). Standard forms made available to patient in After Visit Summary.      Patient Active  Problem List   Diagnosis    Type 2 diabetes mellitus without complication, without long-term current use of insulin (HCC)    Dyslipidemia with elevated low density lipoprotein (LDL) cholesterol and abnormally low high density lipoprotein cholesterol    Essential hypertension    Tortuous aorta (HCC)    Diabetic nephropathy associated with type 2 diabetes mellitus (HCC)    Tinnitus of both ears    1St degree AV block    Stage 2 chronic kidney disease    Burning mouth syndrome    Dermatitis    Vitiligo    Lymphocytic esophagitis    Proteinuria due to type 2 diabetes mellitus (HCC)    Agatston CAC score 100-199    External hemorrhoid    Osteopenia of multiple sites    Chest pain, rule out acute myocardial infarction    Primary open angle glaucoma (POAG) of both eyes, indeterminate stage     Allergies:  He is allergic to statins and codeine.    Current Medications:  Outpatient Medications Marked as Taking for the 6/3/24 encounter (Office Visit) with Jonny Hansen MD   Medication Sig    timolol 0.5 % Ophthalmic Solution Place 1 drop into both eyes daily.    Glucose Blood (ACCU-CHEK PRATIMA PLUS) In Vitro Strip CHECK BLOOD GLUCOSE TWO TIMES DAILY    PRAVASTATIN 10 MG Oral Tab TAKE 1 TABLET EVERY NIGHT (Patient taking differently: Take 1 tablet (10 mg total) by mouth nightly. Takes occasionally)    AMLODIPINE 10 MG Oral Tab TAKE 1 TABLET EVERY DAY    IRBESARTAN 300 MG Oral Tab TAKE 1 TABLET EVERY NIGHT    OMEPRAZOLE 20 MG Oral Capsule Delayed Release TAKE 1 CAPSULE (20 MG TOTAL) BY MOUTH BEFORE BREAKFAST.    METFORMIN  MG Oral Tablet 24 Hr TAKE 1 TABLET EVERY NIGHT    budesonide 0.5 MG/2ML Inhalation Suspension Mix 2 ml (0.5 mg) in honey or apple sauce and swallow twice daily.  Rinse your mouth after taking and spit.  Do not eat or drink for 30 minutes after use.    LATANOPROST 0.005 % Ophthalmic Solution INSTILL 1 DROP IN BOTH EYES EVERY NIGHT    Blood Glucose Calibration (ACCU-CHEK PRATIMA) In Vitro Solution      Accu-Chek Softclix Lancets Does not apply Misc     Lancets Misc. (ACCU-CHEK SOFTCLIX LANCET DEV) Does not apply Kit        Medical History:  He  has a past medical history of Constipation (hardnes), Diabetes (), Diabetic eye exam ([facility]) (2020), High blood pressure, Osteoporosis (), and Unspecified essential hypertension.  Surgical History:  He  has a past surgical history that includes electrocardiogram, complete (2012) and Eye surgery.   Family History:  His family history includes Diabetes in his mother; Heart Disease in his father; Hypertension in his mother.  Social History:  He  reports that he quit smoking about 47 years ago. His smoking use included cigarettes. He started smoking about 49 years ago. He has never used smokeless tobacco. He reports current alcohol use of about 2.0 standard drinks of alcohol per week. He reports that he does not use drugs.    Tobacco:  He smoked tobacco in the past but quit greater than 12 months ago.  Social History     Tobacco Use   Smoking Status Former    Current packs/day: 0.00    Types: Cigarettes    Start date: 1974    Quit date: 1976    Years since quittin.9   Smokeless Tobacco Never          CAGE Alcohol Screen:   CAGE screening score of 0 on 2024, showing low risk of alcohol abuse.      Patient Care Team:  Jonny Hansen MD as PCP - General (Internal Medicine)  August Cardoso DO (INFECTIOUS DISEASES)    Review of Systems  GENERAL: feels well otherwise  SKIN: denies any unusual skin lesions  EYES: denies blurred vision or double vision  HEENT: denies nasal congestion, sinus pain or ST  LUNGS: denies shortness of breath with exertion  CARDIOVASCULAR: denies chest pain on exertion  GI: denies abdominal pain, denies heartburn  : 0 per night nocturia, no complaint of urinary incontinence  MUSCULOSKELETAL: denies back pain  NEURO: denies headaches  PSYCHE: denies depression or anxiety  HEMATOLOGIC: denies hx of anemia  ENDOCRINE:  denies thyroid history  ALL/ASTHMA: denies hx of allergy or asthma    Objective:   Physical Exam  General Appearance:  Alert, cooperative, no distress, appears stated age   Head:  Normocephalic, without obvious abnormality, atraumatic   Eyes:  BL conjunctiva WNL   Ears:  TM WNL BL   Nose: Deferred   Throat: Deferred   Neck: Supple, symmetrical, trachea midline, no adenopathy, thyroid: not enlarged, symmetric, no tenderness/mass/nodules, no carotid bruit or JVD   Back:   Symmetric, no curvature, ROM normal, no CVA tenderness   Lungs:   Clear to auscultation bilaterally, respirations unlabored   Chest Wall:  No tenderness or deformity   Heart:  Regular rate and rhythm, S1, S2 normal, no murmur, rub or gallop   Abdomen:   Soft, non-tender, bowel sounds active all four quadrants,  no masses, no organomegaly   Genitalia: Deferred   Rectal: Deferred   Extremities: No edema   Pulses: 2+ and symmetric   Skin: Skin color, texture, turgor normal, no rashes or lesions   Lymph nodes: Cervical nodes normal   Neurologic: Grossly normal   Bilateral barefoot skin diabetic exam is normal, visualized feet and the appearance is normal.  Bilateral monofilament/sensation of both feet is normal.  Pulsation pedal pulse exam of both lower legs/feet is normal as well.      /88 (BP Location: Left arm, Patient Position: Sitting, Cuff Size: adult)   Pulse 65   Temp 97.8 °F (36.6 °C) (Skin)   Resp 16   Ht 5' 8\" (1.727 m)   Wt 143 lb 12.8 oz (65.2 kg)   SpO2 98%   BMI 21.86 kg/m²  Estimated body mass index is 21.86 kg/m² as calculated from the following:    Height as of this encounter: 5' 8\" (1.727 m).    Weight as of this encounter: 143 lb 12.8 oz (65.2 kg).    Medicare Hearing Assessment:   Hearing Screening    Time taken: 6/3/2024  2:35 PM  Entry User: Jonny Hansen MD  Screening Method: Whisper Test  Whisper Test Result: Pass         Visual Acuity:   Right Eye Visual Acuity: Corrected Right Eye Chart Acuity: 20/200   Left Eye  Visual Acuity: Corrected Left Eye Chart Acuity: 20/50   Both Eyes Visual Acuity: Corrected Both Eyes Chart Acuity: 20/50   Able To Tolerate Visual Acuity: Yes        Assessment & Plan:   Matthew Marks is a 78 year old male who presents for a Medicare Assessment.     Outstanding screening and preventive measures:  Shingles immunization: advised to obtain from pharmacy    Right plantar faciitis:  Improving   Continue use of supportive footwear    Left trochanteric bursitis:  Literature of stretching exercises provided  If symptoms do not improve will refer to ortho service    DM2:  Improving cntrol, with HbA1c of 7.2%; increase metformin er from 500 mg daily to 500 mg BID. Patient will consider jardiance use in light of CAD.  Microalbuminuria: continue irbesartan  Mixed hyperlipidemia with LDL above goal: continue atorvastatin therapy. Dietary management reinforced.  Labs in 3 months    Lymphocytic esophagitis and GERD:  Stable   Continue oral PPI therapy and inhaled steroid therapy  Following with GI service regularly      Hypertension:  Stable/controlled  Continue irbesartan 300 mg daily and amlodipine 10 mg daily     Glaucoma and cataracts:  Following with ophthalmology service     External hemorrhoid:  Topical anusol ordered  Continue fiber supplemental      Osteopenia:  RF: chronic swallowed steroid and oral PPI therapies  Declined bisphosphonate therapy at this time     Vitiligo:  Stable  Following with dermatology service    Bilateral tinnitis:  Following with ENT service  1. Encounter for annual health examination (Primary)  The patient indicates understanding of these issues and agrees to the plan.  Reinforced healthy diet, lifestyle, and exercise.      Return in 6 months (on 12/3/2024).     Jonny Hansen MD, 6/3/2024     Supplementary Documentation:   General Health:  In the past six months, have you lost more than 10 pounds without trying?: 2 - No  Has your appetite been poor?: No  Type of Diet:  Balanced  How does the patient maintain a good energy level?: Appropriate Exercise;Daily Walks;Stretching  How would you describe your daily physical activity?: Moderate  How would you describe your current health state?: Good  How do you maintain positive mental well-being?: Visiting Friends  On a scale of 0 to 10, with 0 being no pain and 10 being severe pain, what is your pain level?: 1 - (Mild)  In the past six months, have you experienced urine leakage?: 0-No  At any time do you feel concerned for the safety/well-being of yourself and/or your children, in your home or elsewhere?: No  Have you had any immunizations at another office such as Influenza, Hepatitis B, Tetanus, or Pneumococcal?: Yes        Matthew Marks's SCREENING SCHEDULE   Tests on this list are recommended by your physician but may not be covered, or covered at this frequency, by your insurer.   Please check with your insurance carrier before scheduling to verify coverage.   PREVENTATIVE SERVICES FREQUENCY &  COVERAGE DETAILS LAST COMPLETION DATE   Diabetes Screening    Fasting Blood Sugar / Glucose    One screening every 12 months if never tested or if previously tested but not diagnosed with pre-diabetes   One screening every 6 months if diagnosed with pre-diabetes Lab Results   Component Value Date     (H) 05/20/2024        Cardiovascular Disease Screening    Lipid Panel  Cholesterol  Lipoprotein (HDL)  Triglycerides Covered every 5 years for all Medicare beneficiaries without apparent signs or symptoms of cardiovascular disease Lab Results   Component Value Date    CHOLEST 200 (H) 05/20/2024    HDL 49 05/20/2024     (H) 05/20/2024    TRIG 180 (H) 05/20/2024         Electrocardiogram (EKG)   Covered if needed at Welcome to Medicare, and non-screening if indicated for medical reasons 01/29/2024      Ultrasound Screening for Abdominal Aortic Aneurysm (AAA) Covered once in a lifetime for one of the following risk factors     Men who are 65-75 years old and have ever smoked    Anyone with a family history -     Colorectal Cancer Screening  Covered for ages 50-85; only need ONE of the following:    Colonoscopy   Covered every 10 years    Covered every 2 years if patient is at high risk or previous colonoscopy was abnormal -    No recommendations at this time    Flexible Sigmoidoscopy   Covered every 4 years -    Fecal Occult Blood Test Covered annually -   Prostate Cancer Screening    Prostate-Specific Antigen (PSA) Annually Lab Results   Component Value Date    PSA 0.7 08/02/2017     There are no preventive care reminders to display for this patient.   Immunizations    Influenza Covered once per flu season  Please get every year 11/29/2023  No recommendations at this time    Pneumococcal Each vaccine ([id number] & [id number]) covered once after 65 Prevnar 13: 03/08/2016    [id number]: 04/19/2018     No recommendations at this time    Hepatitis B One screening covered for patients with certain risk factors   -  No recommendations at this time    Tetanus Toxoid Not covered by Medicare Part B unless medically necessary (cut with metal); may be covered with your pharmacy prescription benefits -    Tetanus, Diptheria and Pertusis TD and TDaP Not covered by Medicare Part B -  No recommendations at this time    Zoster Not covered by Medicare Part B; may be covered with your pharmacy  prescription benefits -  Zoster Vaccines(1 of 2) Never done     Diabetes      Hemoglobin A1C Annually; if last result is elevated, may be asked to retest more frequently.    Medicare covers every 3 months Lab Results   Component Value Date     (H) 05/20/2024    A1C 7.2 (H) 05/20/2024       No recommendations at this time    Creat/alb ratio Annually Lab Results   Component Value Date    MICROALBCREA 121.0 (H) 02/02/2024       LDL Annually Lab Results   Component Value Date     (H) 05/20/2024       Dilated Eye Exam Annually Last Diabetic Eye Exam:  No  data recorded  No data recorded       Annual Monitoring of Persistent Medications (ACE/ARB, digoxin diuretics, anticonvulsants)    Potassium Annually Lab Results   Component Value Date    K 4.5 05/20/2024         Creatinine   Annually Lab Results   Component Value Date    CREATSERUM 0.94 05/20/2024         BUN Annually Lab Results   Component Value Date    BUN 17 05/20/2024       Drug Serum Conc Annually No results found for: \"DIGOXIN\", \"DIG\", \"VALP\"

## 2024-06-07 ENCOUNTER — TELEPHONE (OUTPATIENT)
Dept: INTERNAL MEDICINE CLINIC | Facility: CLINIC | Age: 79
End: 2024-06-07

## 2024-06-07 NOTE — TELEPHONE ENCOUNTER
Received a fax from [facility] requesting an order for Accu-Chek Guide test strips(50), Accu-Chek Guide Control Sol (Level 1 and 2), Accu-Chek SoftClix Lancet Device Kit, and Dropsafe Alcohol Prep Pad.

## 2024-06-10 RX ORDER — BLOOD-GLUCOSE METER
1 EACH MISCELLANEOUS 3 TIMES DAILY PRN
Qty: 1 KIT | Refills: 0 | Status: SHIPPED | OUTPATIENT
Start: 2024-06-10

## 2024-06-10 RX ORDER — BLOOD SUGAR DIAGNOSTIC
STRIP MISCELLANEOUS
Qty: 300 STRIP | Refills: 3 | Status: SHIPPED | OUTPATIENT
Start: 2024-06-10 | End: 2025-06-10

## 2024-06-10 RX ORDER — ISOPROPYL ALCOHOL 70 ML/100ML
1 SWAB TOPICAL 3 TIMES DAILY PRN
Qty: 300 EACH | Refills: 1 | Status: SHIPPED | OUTPATIENT
Start: 2024-06-10

## 2024-06-10 NOTE — TELEPHONE ENCOUNTER
Matthew Marks requesting Medication Refill for:  Medication name and dose (copy and paste from medication list):   Accu-Chek Softclix Lancets Does not apply Misc -- -- 12/10/2019 --    Class: Historical      If medication is not on medication list - transfer patient to RN queue for triage    LOV: 6/3/2024   Last Refill date: 12/10/19  Preferred Pharmacy:   [facility] - [place], [place] - [address] [phone], [phone]   [address] [address]   Phone: [phone] Fax: [phone]   Hours: Not open 24 hours     Next Scheduled appointment: No future appointments.

## 2024-06-12 NOTE — TELEPHONE ENCOUNTER
Below scripts were supposed to go to [facility]. Please confirm with pt as we have received faxed from [facility] and not [facility] were scripts were sent

## 2024-06-13 RX ORDER — BLOOD-GLUCOSE METER
1 EACH MISCELLANEOUS 3 TIMES DAILY PRN
Qty: 1 KIT | Refills: 0 | OUTPATIENT
Start: 2024-06-13

## 2024-06-13 RX ORDER — BLOOD SUGAR DIAGNOSTIC
STRIP MISCELLANEOUS
Qty: 300 STRIP | Refills: 3 | OUTPATIENT
Start: 2024-06-13 | End: 2025-06-13

## 2024-06-13 RX ORDER — ISOPROPYL ALCOHOL 70 ML/100ML
1 SWAB TOPICAL 3 TIMES DAILY PRN
Qty: 300 EACH | Refills: 1 | OUTPATIENT
Start: 2024-06-13

## 2024-06-13 NOTE — TELEPHONE ENCOUNTER
Medications pended for [facility]. Left pt a voicemail to confirm what pharmacy they want the medications sent to.

## 2024-06-14 RX ORDER — LANCETS
1 EACH MISCELLANEOUS 3 TIMES DAILY
Qty: 300 EACH | Refills: 3 | Status: SHIPPED | OUTPATIENT
Start: 2024-06-14

## 2024-06-14 NOTE — TELEPHONE ENCOUNTER
Refill passed per [facility] protocol.    Yuri Wadsworth4 days ago     VALENTIN Marks requesting Medication Refill for:  Medication name and dose (copy and paste from medication list):         Requested Prescriptions   Pending Prescriptions Disp Refills    Accu-Chek Softclix Lancets Does not apply Misc 100 each 3     Sig: Use three times daily, as directed       Diabetic Supplies Protocol Passed - 6/10/2024 11:06 PM        Passed - In person appointment or virtual visit in the past 12 mos or appointment in next 3 mos     Recent Outpatient Visits              1 week ago Encounter for annual health examination    [address] Jonny Pimentel MD    Office Visit    3 months ago Tinnitus of both ears    [facility] Jelena Sol AUD    Office Visit    3 months ago [facility], [facility]    [facility]    4 months ago Left-sided chest wall pain    [address] Jonny Pimentel MD    Office Visit    5 months ago Acute rhinosinusitis    [address] Jonny Pimentel MD    Telemedicine                         Recent Outpatient Visits              1 week ago Encounter for annual health examination    [address], Jonny Pimentel MD    Office Visit    3 months ago [facility]    [facility] Visit    3 months ago Tinnitus, unspecified laterality    [facility] Kasie Hutchison [facility]    Office Visit    4 months ago Left-sided chest wall pain    [address] Jonny Pimentel MD    Office Visit    5 months ago Acute rhinosinusitis    [address]  [place], Jonny Pimentel MD    Telemedicine

## 2024-06-18 ENCOUNTER — TELEPHONE (OUTPATIENT)
Dept: INTERNAL MEDICINE CLINIC | Facility: CLINIC | Age: 79
End: 2024-06-18

## 2024-06-18 DIAGNOSIS — R80.9 TYPE 2 DIABETES MELLITUS WITH MICROALBUMINURIA, WITHOUT LONG-TERM CURRENT USE OF INSULIN (HCC): ICD-10-CM

## 2024-06-18 DIAGNOSIS — E11.29 TYPE 2 DIABETES MELLITUS WITH MICROALBUMINURIA, WITHOUT LONG-TERM CURRENT USE OF INSULIN (HCC): ICD-10-CM

## 2024-06-18 NOTE — TELEPHONE ENCOUNTER
Routing through protocols     Accu-Chek Softclix Lancets Does not apply Misc  Blood Glucose Monitoring Suppl (ACCU-CHEK GUIDE) w/Device Does not apply Kit  Glucose Blood (ACCU-CHEK PRATIMA PLUS) In Vitro Strip  Blood Glucose Monitoring Suppl (ACCU-CHEK PRATIMA PLUS) w/Device Does not apply Kit

## 2024-06-18 NOTE — TELEPHONE ENCOUNTER
Matthew Marks requesting Medication Refill for:    Medication name and dose (copy and paste from medication list):   Medication Quantity Refills Start End   Accu-Chek Softclix Lancets Does not apply Misc 300 each 3 2024 --   Si Lancet by Finger stick route 3 (three) times daily.     Route:   Finger stick     Note to Pharmacy:   DX code E11.29     Order #:   [id number]         If medication is not on medication list - transfer patient to RN queue for triage    Preferred Pharmacy:   [facility] - [facility] [address] [facility] [phone], [phone]   [address] [address]   Phone: [phone] Fax: [phone]   Hours: Not open 24 hours       LOV: 6/3/2024   Last Refill date: 24  Next Scheduled appointment: No future appointments.    Matthew Marks requesting Medication Refill for:    Medication name and dose (copy and paste from medication list):   Medication Quantity Refills Start End   Blood Glucose Monitoring Suppl (ACCU-CHEK GUIDE) w/Device Does not apply Kit 1 kit 0 6/10/2024 --   Si Application 3 (three) times daily as needed.     Route:   Does not apply     Order #:   [id number]         If medication is not on medication list - transfer patient to RN queue for triage    Preferred Pharmacy:   [facility] [address] [facility] [phone], [phone]   [address] [id number]   Phone: [phone] Fax: [phone]   Hours: Not open 24 hours       LOV: 6/3/2024   Last Refill date: 6/10/24  Next Scheduled appointment: No future appointments.    Matthew Marks requesting Medication Refill for:    Medication name and dose (copy and paste from medication list):   Medication Quantity Refills Start End   Glucose Blood (ACCU-CHEK PRATIMA PLUS) In Vitro Strip 200 strip 3 2024 --   Sig:   CHECK BLOOD GLUCOSE TWO TIMES DAILY     Route:   (none)     Order #:   [id number]          If medication is not on medication list - transfer patient to RN queue for triage    Preferred Pharmacy:   [facility] - [facility] [address] [facility] [phone], [phone]   [address] [address]   Phone: [phone] Fax: [phone]   Hours: Not open 24 hours       LOV: 6/3/2024   Last Refill date: 24  Next Scheduled appointment: No future appointments.    Matthew Marks requesting Medication Refill for:    Medication name and dose (copy and paste from medication list):   Medication Quantity Refills Start End   Blood Glucose Monitoring Suppl (ACCU-CHEK PRATIMA PLUS) w/Device Does not apply Kit 1 kit 0 6/3/2024 6/3/2025   Si Device by Other route 2 (two) times daily.     Route:   Other     Order #:   [id number]         If medication is not on medication list - transfer patient to RN queue for triage    Preferred Pharmacy:   [facility] [address] [facility] [phone], [phone]   [address] [address]   Phone: [phone] Fax: [phone]   Hours: Not open 24 hours       LOV: 6/3/2024   Last Refill date: 6/3/24  Next Scheduled appointment: No future appointments.

## 2024-06-20 NOTE — TELEPHONE ENCOUNTER
Please call patient and go over which testing supplies he needs, looks like he is requesting 2 different meters.    Does he already have a meter at home? If he does what brand is that? If he does not then which one does he want Accu-chek Guide or Accu-chek Isadora?    On 06/05/2024 according to dispense history he got Accu-chek guide me meter. Please verify if he has that.     [facility] [place] but looks like he is requesting them be sent to [facility], please just verify with him also.

## 2024-06-24 RX ORDER — LANCETS
1 EACH MISCELLANEOUS 3 TIMES DAILY
Qty: 300 EACH | Refills: 3 | OUTPATIENT
Start: 2024-06-24

## 2024-06-24 RX ORDER — BLOOD SUGAR DIAGNOSTIC
STRIP MISCELLANEOUS
Qty: 300 STRIP | Refills: 3 | OUTPATIENT
Start: 2024-06-24 | End: 2025-06-18

## 2024-06-24 RX ORDER — BLOOD-GLUCOSE METER
1 EACH MISCELLANEOUS 3 TIMES DAILY PRN
Qty: 1 KIT | Refills: 0 | OUTPATIENT
Start: 2024-06-24

## 2024-06-24 RX ORDER — BLOOD-GLUCOSE METER
1 EACH MISCELLANEOUS 2 TIMES DAILY
Qty: 1 KIT | Refills: 0 | OUTPATIENT
Start: 2024-06-24 | End: 2025-06-24

## 2024-06-24 NOTE — TELEPHONE ENCOUNTER
RN to pt call, unable to leave detailed message on unidentified mailbox.  Informed in VM a MC message will be sent (TU 6/14/24).

## 2024-09-11 ENCOUNTER — TELEPHONE (OUTPATIENT)
Dept: FAMILY MEDICINE CLINIC | Facility: CLINIC | Age: 79
End: 2024-09-11

## 2024-09-11 NOTE — TELEPHONE ENCOUNTER
Received a faxed request from [facility] requesting we obtain prior authorization for patient's upcoming cataract surgeries.  Will forward message to Referral Managed Care Team and request if they are not the correct group to please forward to the appropriate Referral Team.    This is the message:    Our patient Matthew Marks,  1945, is TENTATIVELY scheduled for cataract surgery with IV sedation on the RIGHT EYE on 2025 and the LEFT EYE on March 3, 2025.  These dates will be held for 30 days only.    [facility] requires patients with [employer] HMO insurance with the primary care physician outside of [facility] to obtain a referral for surgery.  The referral must contain the following information.    Authorization # from insurance company.  Effective/Expiration dates which must include the surgery dates  Number of visits  CPT code:  87399  Diagnosis code:  H25.813 both eyes, H25.811 right eye, H25.812 left eye  Referring to Dr. Jamie Chavez  NPI# [id number]  [facility]  NPI# [id number]    Please fax to the referral dept to [facility] at [phone]    If there are any questions, please contact Deanna Martins at [phone].

## 2024-09-17 ENCOUNTER — TELEPHONE (OUTPATIENT)
Dept: INTERNAL MEDICINE CLINIC | Facility: CLINIC | Age: 79
End: 2024-09-17

## 2024-09-17 NOTE — TELEPHONE ENCOUNTER
Spoke to Ling Aguilar in [facility] Referral Department yesterday.  States since there is an authorized referral for Dr. Jamie Chavez, Ophthalmology, [facility], they are responsible for obtaining authorization for surgery at [facility].    Called Surgery Scheduler at number on letter.  [facility] with the above information.  Gave the number to [facility] Referral Department to call with any questions.  Faxed letter back to [phone] with the above information. Confirmation fax received.   66

## 2024-09-17 NOTE — TELEPHONE ENCOUNTER
Patient called and scheduled pre op with Dr. Jonny Hansen.  Patient is having cataract surgery with Dr. Chavez in February and March on 2025 but they need clearance no later than 10/9/24.  Contacted Dr. Chavez's office and they will fax over Clearance letter.    Future Appointments   Date Time Provider Department Center   10/7/2024  2:20 PM Jonny Hansen MD [facility]

## 2024-10-03 ENCOUNTER — LAB ENCOUNTER (OUTPATIENT)
Dept: LAB | Age: 79
End: 2024-10-03
Attending: INTERNAL MEDICINE
Payer: MEDICARE

## 2024-10-03 DIAGNOSIS — E11.29 TYPE 2 DIABETES MELLITUS WITH MICROALBUMINURIA, WITHOUT LONG-TERM CURRENT USE OF INSULIN (HCC): ICD-10-CM

## 2024-10-03 DIAGNOSIS — R80.9 TYPE 2 DIABETES MELLITUS WITH MICROALBUMINURIA, WITHOUT LONG-TERM CURRENT USE OF INSULIN (HCC): ICD-10-CM

## 2024-10-03 LAB
ALBUMIN SERPL-MCNC: 4.7 G/DL (ref 3.2–4.8)
ALBUMIN/GLOB SERPL: 1.5 {RATIO} (ref 1–2)
ALP LIVER SERPL-CCNC: 60 U/L
ALT SERPL-CCNC: 22 U/L
ANION GAP SERPL CALC-SCNC: 7 MMOL/L (ref 0–18)
AST SERPL-CCNC: 25 U/L (ref ?–34)
BILIRUB SERPL-MCNC: 0.9 MG/DL (ref 0.2–1.1)
BUN BLD-MCNC: 14 MG/DL (ref 9–23)
CALCIUM BLD-MCNC: 9.7 MG/DL (ref 8.7–10.4)
CHLORIDE SERPL-SCNC: 104 MMOL/L (ref 98–112)
CHOLEST SERPL-MCNC: 192 MG/DL (ref ?–200)
CO2 SERPL-SCNC: 26 MMOL/L (ref 21–32)
CREAT BLD-MCNC: 1.02 MG/DL
EGFRCR SERPLBLD CKD-EPI 2021: 75 ML/MIN/1.73M2 (ref 60–?)
EST. AVERAGE GLUCOSE BLD GHB EST-MCNC: 160 MG/DL (ref 68–126)
FASTING PATIENT LIPID ANSWER: YES
FASTING STATUS PATIENT QL REPORTED: YES
GLOBULIN PLAS-MCNC: 3.2 G/DL (ref 2–3.5)
GLUCOSE BLD-MCNC: 139 MG/DL (ref 70–99)
HBA1C MFR BLD: 7.2 % (ref ?–5.7)
HDLC SERPL-MCNC: 44 MG/DL (ref 40–59)
LDLC SERPL CALC-MCNC: 118 MG/DL (ref ?–100)
NONHDLC SERPL-MCNC: 148 MG/DL (ref ?–130)
OSMOLALITY SERPL CALC.SUM OF ELEC: 287 MOSM/KG (ref 275–295)
POTASSIUM SERPL-SCNC: 4.2 MMOL/L (ref 3.5–5.1)
PROT SERPL-MCNC: 7.9 G/DL (ref 5.7–8.2)
SODIUM SERPL-SCNC: 137 MMOL/L (ref 136–145)
TRIGL SERPL-MCNC: 171 MG/DL (ref 30–149)
VLDLC SERPL CALC-MCNC: 30 MG/DL (ref 0–30)

## 2024-10-03 PROCEDURE — 80061 LIPID PANEL: CPT

## 2024-10-03 PROCEDURE — 83036 HEMOGLOBIN GLYCOSYLATED A1C: CPT

## 2024-10-03 PROCEDURE — 80053 COMPREHEN METABOLIC PANEL: CPT

## 2024-10-03 PROCEDURE — 36415 COLL VENOUS BLD VENIPUNCTURE: CPT

## 2024-10-07 ENCOUNTER — OFFICE VISIT (OUTPATIENT)
Dept: INTERNAL MEDICINE CLINIC | Facility: CLINIC | Age: 79
End: 2024-10-07
Payer: MEDICARE

## 2024-10-07 ENCOUNTER — HOSPITAL ENCOUNTER (OUTPATIENT)
Dept: GENERAL RADIOLOGY | Age: 79
Discharge: HOME | End: 2024-10-07
Attending: INTERNAL MEDICINE
Payer: MEDICARE

## 2024-10-07 VITALS
BODY MASS INDEX: 21.98 KG/M2 | DIASTOLIC BLOOD PRESSURE: 80 MMHG | HEIGHT: 68 IN | RESPIRATION RATE: 18 BRPM | TEMPERATURE: 97 F | SYSTOLIC BLOOD PRESSURE: 132 MMHG | WEIGHT: 145 LBS | OXYGEN SATURATION: 98 % | HEART RATE: 61 BPM

## 2024-10-07 DIAGNOSIS — R80.9 TYPE 2 DIABETES MELLITUS WITH MICROALBUMINURIA, WITHOUT LONG-TERM CURRENT USE OF INSULIN (HCC): ICD-10-CM

## 2024-10-07 DIAGNOSIS — E11.29 TYPE 2 DIABETES MELLITUS WITH MICROALBUMINURIA, WITHOUT LONG-TERM CURRENT USE OF INSULIN (HCC): ICD-10-CM

## 2024-10-07 DIAGNOSIS — M79.641 RIGHT HAND PAIN: Primary | ICD-10-CM

## 2024-10-07 DIAGNOSIS — M79.641 RIGHT HAND PAIN: ICD-10-CM

## 2024-10-07 DIAGNOSIS — H25.813 MIXED TYPE AGE-RELATED CATARACT, BOTH EYES: ICD-10-CM

## 2024-10-07 DIAGNOSIS — E78.2 MIXED HYPERLIPIDEMIA: ICD-10-CM

## 2024-10-07 PROCEDURE — 3075F SYST BP GE 130 - 139MM HG: CPT | Performed by: INTERNAL MEDICINE

## 2024-10-07 PROCEDURE — 3079F DIAST BP 80-89 MM HG: CPT | Performed by: INTERNAL MEDICINE

## 2024-10-07 PROCEDURE — 3008F BODY MASS INDEX DOCD: CPT | Performed by: INTERNAL MEDICINE

## 2024-10-07 PROCEDURE — 1160F RVW MEDS BY RX/DR IN RCRD: CPT | Performed by: INTERNAL MEDICINE

## 2024-10-07 PROCEDURE — 1159F MED LIST DOCD IN RCRD: CPT | Performed by: INTERNAL MEDICINE

## 2024-10-07 PROCEDURE — 73130 X-RAY EXAM OF HAND: CPT | Performed by: INTERNAL MEDICINE

## 2024-10-07 PROCEDURE — 99214 OFFICE O/P EST MOD 30 MIN: CPT | Performed by: INTERNAL MEDICINE

## 2024-10-07 RX ORDER — GLIPIZIDE 5 MG/1
5 TABLET, FILM COATED, EXTENDED RELEASE ORAL DAILY
Qty: 90 TABLET | Refills: 0 | Status: SHIPPED | OUTPATIENT
Start: 2024-10-07

## 2024-10-07 NOTE — PROGRESS NOTES
Matthew Marks  6/23/1945    Chief Complaint   Patient presents with    Pre-Op Exam     EJ RM 7- Pt is here for pre op exam for cataract surgery       SUBJECTIVE   Matthew Marks is a 79 year old male who presents as a follow-up.      Since last evaluation the patient has overall maintained his usual state of health.  He does report a right hand pain, the onset of which was approximately 2 months ago and following a trauma of the right hand against a desk beside him while undergoing stretching exercises.  At that time there was an immediate pain sensation that very slowly and gradually improved.  However, there is a remaining pain and stiffness that is worse with cooler temperatures.  He reports difficulty in making a fist and with gripping, even while driving a vehicle and attempting to  the steering wheel.  Furthermore, blood glucose control has remained stable with hemoglobin A1c of 7.2%.  He has reportedly been taking 1-1/2 tablets of the ordered metformin extended release 500 mg once daily.  He is crushing his medication as he is having difficulty in swallowing a larger pill.  Triglyceride level is 171 with LDL of 118.  He is planning to undergo right cataract removal, however his date has not yet been set as his ophthalmologist and team has not yet obtained authorization for the procedure.    Review of Systems   No f/c/chest pain or sob. No cough. No abd pain/n/v/d. No ha or dizziness. No numbness, tingling, or weakness. No other complaints today.    Current Outpatient Medications   Medication Sig Dispense Refill    glipiZIDE ER 5 MG Oral Tablet 24 Hr Take 1 tablet (5 mg total) by mouth daily. 90 tablet 0    Accu-Chek Softclix Lancets Does not apply Misc 1 Lancet by Finger stick route 3 (three) times daily. 300 each 3    Alcohol Swabs (DROPSAFE ALCOHOL PREP) 70 % Does not apply Pads 1 Application 3 (three) times daily as needed. 300 each 1    Glucose Blood (ACCU-CHEK GUIDE) In Vitro Strip Use  three times daily, as directed 300 strip 3    Blood Glucose Monitoring Suppl (ACCU-CHEK GUIDE) w/Device Does not apply Kit 1 Application 3 (three) times daily as needed. 1 kit 0    Blood Glucose Monitoring Suppl (ACCU-CHEK PRATIMA PLUS) w/Device Does not apply Kit 1 Device by Other route 2 (two) times daily. 1 kit 0    timolol 0.5 % Ophthalmic Solution Place 1 drop into both eyes daily.      Glucose Blood (ACCU-CHEK PRATIMA PLUS) In Vitro Strip CHECK BLOOD GLUCOSE TWO TIMES DAILY 200 strip 3    PRAVASTATIN 10 MG Oral Tab TAKE 1 TABLET EVERY NIGHT (Patient taking differently: Take 1 tablet (10 mg total) by mouth nightly. Takes occasionally) 90 tablet 10    AMLODIPINE 10 MG Oral Tab TAKE 1 TABLET EVERY DAY 90 tablet 10    IRBESARTAN 300 MG Oral Tab TAKE 1 TABLET EVERY NIGHT 90 tablet 10    OMEPRAZOLE 20 MG Oral Capsule Delayed Release TAKE 1 CAPSULE (20 MG TOTAL) BY MOUTH BEFORE BREAKFAST. 90 capsule 10    budesonide 0.5 MG/2ML Inhalation Suspension Mix 2 ml (0.5 mg) in honey or apple sauce and swallow twice daily.  Rinse your mouth after taking and spit.  Do not eat or drink for 30 minutes after use. 180 each 3    LATANOPROST 0.005 % Ophthalmic Solution INSTILL 1 DROP IN BOTH EYES EVERY NIGHT 5 mL 0    Blood Glucose Calibration (ACCU-CHEK PRATIMA) In Vitro Solution       Accu-Chek Softclix Lancets Does not apply Misc       Lancets Misc. (ACCU-CHEK SOFTCLIX LANCET DEV) Does not apply Kit       aspirin 81 MG Oral Tab EC Take 1 tablet (81 mg total) by mouth daily. (Patient not taking: Reported on 2/22/2024)        Allergies   Allergen Reactions    Statins PAIN     Leg pain    Codeine UNKNOWN      Past Medical History:    Constipation    Diabetes ([facility])    Diabetic eye exam ([facility])    [facility]    High blood pressure    Osteoporosis    Unspecified essential hypertension      Patient Active Problem List   Diagnosis    Type 2 diabetes mellitus with microalbuminuria, without long-term current use of insulin ([facility])     Essential hypertension    Diabetic nephropathy associated with type 2 diabetes mellitus (HCC)    Tinnitus of both ears    Vitiligo    Lymphocytic esophagitis    Agatston CAC score 100-199    Osteopenia of multiple sites    Primary open angle glaucoma (POAG) of both eyes, indeterminate stage    Mixed hyperlipidemia      Past Surgical History:   Procedure Laterality Date    Electrocardiogram, complete  2012    Scanned to Media Tab - Date of Service 2012    Eye surgery      eye pressure      Social History     Socioeconomic History    Marital status:    Tobacco Use    Smoking status: Former     Current packs/day: 0.00     Types: Cigarettes     Start date: 1974     Quit date: 1976     Years since quittin.3    Smokeless tobacco: Never   Vaping Use    Vaping status: Never Used   Substance and Sexual Activity    Alcohol use: Yes     Alcohol/week: 2.0 standard drinks of alcohol     Types: 2 Glasses of wine per week     Comment: once in a month    Drug use: No   Other Topics Concern    Caffeine Concern Yes    Stress Concern No    Weight Concern No    Special Diet No    Exercise Yes    Seat Belt Yes    Reaction to local anesthetic No     Social Determinants of Health     Food Insecurity: No Food Insecurity (2024)    Food Insecurity     Food Insecurity: Never true   Transportation Needs: No Transportation Needs (2024)    Transportation Needs     Lack of Transportation: No   Housing Stability: Low Risk  (2024)    Housing Stability     Housing Instability: No         OBJECTIVE:   /80   Pulse 61   Temp 97.2 °F (36.2 °C) (Temporal)   Resp 18   Ht 5' 8\" (1.727 m)   Wt 145 lb (65.8 kg)   SpO2 98%   BMI 22.05 kg/m²   Constitutional: Oriented to person, place, and time. No distress.   HEENT:  Normocephalic and atraumatic.  Cardiovascular: Normal rate, regular rhythm and intact distal pulses.  No murmur, rubs or gallops.   Pulmonary/Chest: Effort normal and breath sounds  normal. No respiratory distress.  Abdominal: Soft. Bowel sounds are normal. Non tender, no masses, no organomegaly or hernias.  Musculoskeletal: No edema  Skin: Skin is warm and dry. No rash.  Psychiatric: Normal mood and affect.     ASSESSMENT AND PLAN:   Matthew Marks is a 79 year old male who presents as a follow-up.    Outstanding screening and preventive measures:  Shingles immunization: To be obtained from pharmacy  Influenza immunization: Administered today    Right hand pain:  Secondary to trauma 2 months ago  X-ray ordered; recommendations to follow    Diabetes mellitus type 2:  Stable with hemoglobin A1c of 7.2%.  Discontinue metformin extended release 500 mg daily, as the patient is needing to crush his extended release tablet in order to obtain on a daily basis.  I initiate glipizide extended release 5 mg once daily with possibility to increase to 10 mg once daily pending blood glucose log in 2 to 3 weeks.  Labs in 3 months    Mixed hyperlipidemia:  LDL at goal with elevated triglyceride level  Tolerating pravastatin 10 mg daily, which is to continue  Labs in 3 months    Right cataract:  Preoperative evaluation not completed today as he is not currently scheduled for his procedure secondary to a lack of insurance authorization from the ophthalmology office.  I am not sure why there is confusion regarding authorization, as obtaining this is not completed by his primary care physician's office.    The patient indicates understanding of these issues and agrees to the plan.  TODAY'S ORDERS     Orders Placed This Encounter   Procedures    High Dose Fluzone trivalent influenza, 65 yrs+ PFS [70763]       Meds & Refills:  Requested Prescriptions     Signed Prescriptions Disp Refills    glipiZIDE ER 5 MG Oral Tablet 24 Hr 90 tablet 0     Sig: Take 1 tablet (5 mg total) by mouth daily.       Imaging & Consults:  INFLUENZA VAC HIGH DOSE PRSV FREE  XR HAND (MIN 3 VIEWS), RIGHT (CPT=73130)    No follow-ups on  file.  There are no Patient Instructions on file for this visit.    All questions were answered and the patient agrees with the plan.     Thank you,  Jonny Hansen MD

## 2024-11-11 RX ORDER — GLIPIZIDE 5 MG/1
5 TABLET, FILM COATED, EXTENDED RELEASE ORAL DAILY
Qty: 90 TABLET | Refills: 3 | Status: SHIPPED | OUTPATIENT
Start: 2024-11-11

## 2024-11-11 NOTE — TELEPHONE ENCOUNTER
New medication started, Medication refill pended for your review/approval       Requested Prescriptions   Pending Prescriptions Disp Refills    glipiZIDE ER 5 MG Oral Tablet 24 Hr 90 tablet 0     Sig: Take 1 tablet (5 mg total) by mouth daily.       Diabetes Medication Protocol Passed - 11/11/2024  2:49 PM        Passed - Last A1C < 7.5 and within past 6 months     Lab Results   Component Value Date    A1C 7.2 (H) 10/03/2024             Passed - In person appointment or virtual visit in the past 6 mos or appointment in next 3 mos     Recent Outpatient Visits              1 month ago Right hand pain    [address] Jonny Hansen MD    Office Visit    5 months ago Encounter for annual health examination    [address] [place] Jonny Hansen MD    Office Visit    8 months ago Tinnitus of both ears    [facility] [place]    Office Visit    8 months ago Tinnitus, unspecified laterality    [facility] [place], Kasie Hutchison MD    Office Visit    9 months ago Left-sided chest wall pain    [address] Jonny Hansen MD    Office Visit          Future Appointments         Provider Department Appt Notes    In 2 months Jonny Hansen MD [address] pre op cpe-cataracts 2/17 and 3/3/25-red folder                    Passed - Microalbumin procedure in past 12 months or taking ACE/ARB        Passed - EGFRCR or GFRNAA > 50     GFR Evaluation  EGFRCR: 75 , resulted on 10/3/2024          Passed - GFR in the past 12 months               Future Appointments         Provider Department Appt Notes    In 2 months Jonny Hansen MD [address] pre op cpe-cataracts 2/17 and 3/3/25-red folder          Recent Outpatient Visits              1 month ago Right  hand pain    [facility], [address] [place] [address], Jonny Pimentel MD    Office Visit    5 months ago Encounter for annual health examination    [facility], [address] [place] [address], Jonny Pimentel MD    Office Visit    8 months ago Tinnitus of both ears    [facility], Jelena Sol AUD    Office Visit    8 months ago Tinnitus, unspecified laterality    [facility], Kasie Hutchison MD    Office Visit    9 months ago Left-sided chest wall pain    [facility], [address] [place] [address], Jonny Pimentel MD    Office Visit

## 2024-11-13 DIAGNOSIS — E11.29 TYPE 2 DIABETES MELLITUS WITH MICROALBUMINURIA, WITHOUT LONG-TERM CURRENT USE OF INSULIN (HCC): ICD-10-CM

## 2024-11-13 DIAGNOSIS — R80.9 TYPE 2 DIABETES MELLITUS WITH MICROALBUMINURIA, WITHOUT LONG-TERM CURRENT USE OF INSULIN (HCC): ICD-10-CM

## 2024-11-13 NOTE — TELEPHONE ENCOUNTER
Matthew Marks requesting Medication Refill for:    Medication name and dose (copy and paste from medication list): Accu-Check Guide Me Glucose Mtr    Accu-Chek Softclix lancets    Accu-Chek Guide Test Strips    If medication is not on medication list - transfer patient to RN queue for triage    Preferred Pharmacy: [facility]    LOV: 10/7/2024   Last Refill date: 6/10/24  Next Scheduled appointment: 2/4/2025

## 2024-11-15 RX ORDER — GLIPIZIDE 5 MG/1
5 TABLET, FILM COATED, EXTENDED RELEASE ORAL DAILY
Qty: 90 TABLET | Refills: 3 | OUTPATIENT
Start: 2024-11-15

## 2024-11-18 RX ORDER — LANCETS
1 EACH MISCELLANEOUS 3 TIMES DAILY
Qty: 300 EACH | Refills: 3 | Status: SHIPPED | OUTPATIENT
Start: 2024-11-18

## 2024-11-18 RX ORDER — BLOOD SUGAR DIAGNOSTIC
1 STRIP MISCELLANEOUS 3 TIMES DAILY
Qty: 300 STRIP | Refills: 3 | Status: SHIPPED | OUTPATIENT
Start: 2024-11-18

## 2024-11-18 RX ORDER — BLOOD-GLUCOSE METER
1 EACH MISCELLANEOUS 3 TIMES DAILY PRN
Qty: 1 KIT | Refills: 0 | Status: SHIPPED | OUTPATIENT
Start: 2024-11-18

## 2024-11-18 RX ORDER — BLOOD-GLUCOSE METER
1 EACH MISCELLANEOUS AS DIRECTED
Qty: 1 KIT | Refills: 3 | OUTPATIENT
Start: 2024-11-18

## 2024-11-18 NOTE — TELEPHONE ENCOUNTER
Please review. Protocol Failed; No Protocol    Requested Prescriptions   Pending Prescriptions Disp Refills    Glucose Blood (ACCU-CHEK GUIDE TEST) In Vitro Strip  0       Diabetic Supplies Protocol Passed - 2024 10:51 AM        Passed - In person appointment or virtual visit in the past 12 mos or appointment in next 3 mos     Recent Outpatient Visits              1 month ago Right hand pain    [address] Jonny Hansen MD    Office Visit    5 months ago Encounter for annual health examination    [address] Jonny Hansen MD    Office Visit    9 months ago Tinnitus of both ears    [facility], Jelena Sol AUD    Office Visit    9 months ago Tinnitus, unspecified laterality    [facility]    [facility] Visit    9 months ago Left-sided chest wall pain    [address] [place] Jonny Hansen MD    Office Visit          Future Appointments         Provider Department Appt Notes    In 2 months Jonny Hansen MD [address] pre op cpe-cataracts  and 3/3/25-red folder                     Signed Prescriptions Disp Refills    Accu-Chek Softclix Lancets Does not apply Misc 300 each 3     Si Lancet by Finger stick route 3 (three) times daily.       Diabetic Supplies Protocol Passed - 2024 10:51 AM        Passed - In person appointment or virtual visit in the past 12 mos or appointment in next 3 mos     Recent Outpatient Visits              1 month ago Right hand pain    [address] Jonny Hansen MD    Office Visit    5 months ago Encounter for annual health examination    [address]    Office Visit    9 months ago Tinnitus of both ears    [place]  [facility]    Office Visit    9 months ago Tinnitus, unspecified laterality    [facility]    [facility] Visit    9 months ago Left-sided chest wall pain    [address] Jonny Hansen MD    Office Visit          Future Appointments         Provider Department Appt Notes    In 2 months Jonny Hansen MD [address] pre op cpe-cataracts  and 3/3/25-red folder                      Blood Glucose Monitoring Suppl (ACCU-CHEK GUIDE) w/Device Does not apply Kit 1 kit 0     Si Application 3 (three) times daily as needed.       Diabetic Supplies Protocol Passed - 2024 10:51 AM        Passed - In person appointment or virtual visit in the past 12 mos or appointment in next 3 mos     Recent Outpatient Visits              1 month ago Right hand pain    [address] Jonny Hansen MD    Office Visit    5 months ago Encounter for annual health examination    [address] Jonny Hansne MD    Office Visit    9 months ago Tinnitus of both ears    [facility], Jelena Sol AUD    Office Visit    9 months ago Tinnitus, unspecified laterality    [facility]    [facility] Visit    9 months ago Left-sided chest wall pain    [address] Jonny Hansen MD    Office Visit          Future Appointments         Provider Department Appt Notes    In 2 months Jonny Hansen MD [address] pre op cpe-cataracts  and 3/3/25-red folder                           Future Appointments         Provider Department Appt Notes    In 2 months Jonny Hansen MD [facility],  [address] [place] [address] pre op cpe-cataracts 2/17 and 3/3/25-red folder          Recent Outpatient Visits              1 month ago Right hand pain    [facility], [address] [place], [place] [address]Amna Amish, MD    Office Visit    5 months ago Encounter for annual health examination    [facility], [address] [place], [place] [address]Amna Amish, MD    Office Visit    9 months ago Tinnitus of both ears    [facility], Jelena Sol, G[place]ME[place]    Office Visit    9 months ago Tinnitus, unspecified laterality    [facility]    Office Visit    9 months ago Left-sided chest wall pain    [address] Jonny Pimentel MD    Office Visit           84.8

## 2024-12-02 ENCOUNTER — TELEPHONE (OUTPATIENT)
Dept: INTERNAL MEDICINE CLINIC | Facility: CLINIC | Age: 79
End: 2024-12-02

## 2024-12-02 NOTE — TELEPHONE ENCOUNTER
FYI     Patient called in stating he started glipizide as prescribed, he states he would take this in the morning with breakfast and by 10am he would have no energy and his sugar was in the 90s.    He has since stopped this and restarted his metformin, he is leaving town Thursday for 2 months. Requesting updated RX  Advised he would need an appointment to discuss medication changes  Agreed to appointment on 12/4 at 10:30am    Future Appointments   Date Time Provider Department Center   12/4/2024 10:30 AM Justine Landry PA-C [facility]   2/4/2025  1:00 PM Jonny Hansen MD [facility]

## 2024-12-04 ENCOUNTER — OFFICE VISIT (OUTPATIENT)
Dept: INTERNAL MEDICINE CLINIC | Facility: CLINIC | Age: 79
End: 2024-12-04
Payer: MEDICARE

## 2024-12-04 ENCOUNTER — TELEPHONE (OUTPATIENT)
Dept: INTERNAL MEDICINE CLINIC | Facility: CLINIC | Age: 79
End: 2024-12-04

## 2024-12-04 VITALS
SYSTOLIC BLOOD PRESSURE: 138 MMHG | HEIGHT: 68 IN | HEART RATE: 88 BPM | WEIGHT: 151.63 LBS | DIASTOLIC BLOOD PRESSURE: 75 MMHG | RESPIRATION RATE: 18 BRPM | BODY MASS INDEX: 22.98 KG/M2 | OXYGEN SATURATION: 97 %

## 2024-12-04 DIAGNOSIS — E11.29 TYPE 2 DIABETES MELLITUS WITH MICROALBUMINURIA, WITHOUT LONG-TERM CURRENT USE OF INSULIN (HCC): Primary | ICD-10-CM

## 2024-12-04 DIAGNOSIS — I10 ESSENTIAL HYPERTENSION: ICD-10-CM

## 2024-12-04 DIAGNOSIS — R80.9 TYPE 2 DIABETES MELLITUS WITH MICROALBUMINURIA, WITHOUT LONG-TERM CURRENT USE OF INSULIN (HCC): Primary | ICD-10-CM

## 2024-12-04 DIAGNOSIS — R30.0 DYSURIA: ICD-10-CM

## 2024-12-04 DIAGNOSIS — R35.0 URINARY FREQUENCY: ICD-10-CM

## 2024-12-04 LAB
APPEARANCE: CLEAR
BILIRUBIN: NEGATIVE
GLUCOSE (URINE DIPSTICK): 100 MG/DL
KETONES (URINE DIPSTICK): NEGATIVE MG/DL
LEUKOCYTES: NEGATIVE
MULTISTIX LOT#: ABNORMAL NUMERIC
NITRITE, URINE: POSITIVE
PH, URINE: 5 (ref 4.5–8)
PROTEIN (URINE DIPSTICK): 100 MG/DL
SPECIFIC GRAVITY: <=1.005 (ref 1–1.03)
URINE-COLOR: YELLOW
UROBILINOGEN,SEMI-QN: 1 MG/DL (ref 0–1.9)

## 2024-12-04 PROCEDURE — 99214 OFFICE O/P EST MOD 30 MIN: CPT | Performed by: PHYSICIAN ASSISTANT

## 2024-12-04 PROCEDURE — 3008F BODY MASS INDEX DOCD: CPT | Performed by: PHYSICIAN ASSISTANT

## 2024-12-04 PROCEDURE — 3075F SYST BP GE 130 - 139MM HG: CPT | Performed by: PHYSICIAN ASSISTANT

## 2024-12-04 PROCEDURE — 81003 URINALYSIS AUTO W/O SCOPE: CPT | Performed by: PHYSICIAN ASSISTANT

## 2024-12-04 PROCEDURE — 3078F DIAST BP <80 MM HG: CPT | Performed by: PHYSICIAN ASSISTANT

## 2024-12-04 PROCEDURE — 87086 URINE CULTURE/COLONY COUNT: CPT | Performed by: PHYSICIAN ASSISTANT

## 2024-12-04 PROCEDURE — 1159F MED LIST DOCD IN RCRD: CPT | Performed by: PHYSICIAN ASSISTANT

## 2024-12-04 RX ORDER — CEPHALEXIN 250 MG/5ML
500 POWDER, FOR SUSPENSION ORAL 2 TIMES DAILY
Qty: 140 ML | Refills: 0 | Status: SHIPPED | OUTPATIENT
Start: 2024-12-04 | End: 2024-12-11

## 2024-12-04 NOTE — TELEPHONE ENCOUNTER
Brunilda call from Salma at [facility]. She states they need clarification on Cephalexin prescription. The instructions say take for 7 days then say take for 10 days.     Justine Landry PA-C - is pt to take for 7 or 10 days?

## 2024-12-04 NOTE — PROGRESS NOTES
Chief Complaint   Patient presents with    Diabetes     Follow up- room # 6 KB       HPI:  Pt presents with c/o urinary frequency/dysuria X 3 days and SEs related to change from Metformin ER to Glipizide.  Pt states his blood sugars dropped into the 90s and he has shakiness, dizziness.  Bloods sugars never went under 89.  Changed back to Metformin ER but continues to crush the pills as he cannot swallow a whole pill (Jade changed his medication due to his difficulty swallowing the pills).  Pt is wanting to change back to Metformin as he is leaving the country for the next 2 mos.  Has never tried immed release Metformin.    Pt denies blood in urine.    Review of Systems   No f/c/chest pain or sob. No other complaints today.    Past Medical History:    Constipation    Diabetes ([facility])    Diabetic eye exam ([facility])    [facility]    High blood pressure    Osteoporosis    Unspecified essential hypertension       Patient Active Problem List   Diagnosis    Type 2 diabetes mellitus with microalbuminuria, without long-term current use of insulin ([facility])    Essential hypertension    Diabetic nephropathy associated with type 2 diabetes mellitus ([facility])    Tinnitus of both ears    Vitiligo    Lymphocytic esophagitis    Agatston CAC score 100-199    Osteopenia of multiple sites    Primary open angle glaucoma (POAG) of both eyes, indeterminate stage    Mixed hyperlipidemia    Mixed type age-related cataract, both eyes       Current Outpatient Medications   Medication Sig Dispense Refill    metFORMIN 500 MG Oral Tab Take 1 tablet (500 mg total) by mouth 2 (two) times daily with meals. 180 tablet 0    cephALEXin 250 MG/5ML Oral Recon Susp Take 10 mL (500 mg total) by mouth in the morning and 10 mL (500 mg total) before bedtime. Do all this for 7 days. For 10 days. 140 mL 0    Accu-Chek Softclix Lancets Does not apply Misc 1 Lancet by Finger stick route 3 (three) times daily. 300 each 3    Blood Glucose Monitoring Suppl (ACCU-CHEK  GUIDE) w/Device Does not apply Kit 1 Application 3 (three) times daily as needed. 1 kit 0    Glucose Blood (ACCU-CHEK GUIDE TEST) In Vitro Strip 1 each by Other route 3 (three) times daily. Use 1 test strip three times daily as directed. 300 strip 3    glipiZIDE ER 5 MG Oral Tablet 24 Hr Take 1 tablet (5 mg total) by mouth daily. 90 tablet 3    Alcohol Swabs (DROPSAFE ALCOHOL PREP) 70 % Does not apply Pads 1 Application 3 (three) times daily as needed. 300 each 1    Glucose Blood (ACCU-CHEK GUIDE) In Vitro Strip Use three times daily, as directed 300 strip 3    Blood Glucose Monitoring Suppl (ACCU-CHEK PRATIMA PLUS) w/Device Does not apply Kit 1 Device by Other route 2 (two) times daily. 1 kit 0    timolol 0.5 % Ophthalmic Solution Place 1 drop into both eyes daily.      Glucose Blood (ACCU-CHEK PRATIMA PLUS) In Vitro Strip CHECK BLOOD GLUCOSE TWO TIMES DAILY 200 strip 3    PRAVASTATIN 10 MG Oral Tab TAKE 1 TABLET EVERY NIGHT (Patient taking differently: Take 1 tablet (10 mg total) by mouth nightly. Takes occasionally) 90 tablet 10    AMLODIPINE 10 MG Oral Tab TAKE 1 TABLET EVERY DAY 90 tablet 10    IRBESARTAN 300 MG Oral Tab TAKE 1 TABLET EVERY NIGHT 90 tablet 10    OMEPRAZOLE 20 MG Oral Capsule Delayed Release TAKE 1 CAPSULE (20 MG TOTAL) BY MOUTH BEFORE BREAKFAST. 90 capsule 10    budesonide 0.5 MG/2ML Inhalation Suspension Mix 2 ml (0.5 mg) in honey or apple sauce and swallow twice daily.  Rinse your mouth after taking and spit.  Do not eat or drink for 30 minutes after use. 180 each 3    LATANOPROST 0.005 % Ophthalmic Solution INSTILL 1 DROP IN BOTH EYES EVERY NIGHT 5 mL 0    Blood Glucose Calibration (ACCU-CHEK PRATIMA) In Vitro Solution       Accu-Chek Softclix Lancets Does not apply Misc       Lancets Misc. (ACCU-CHEK SOFTCLIX LANCET DEV) Does not apply Kit          Physical Exam  /75   Pulse 88   Resp 18   Ht 5' 8\" (1.727 m)   Wt 151 lb 9.6 oz (68.8 kg)   SpO2 97%   BMI 23.05 kg/m²   Constitutional:   No distress.   HEENT:  Normocephalic and atraumatic.   Eyes: Conjunctivae are normal  Neck: Neck supple.   Cardiovascular: Normal rate, regular rhythm.  No murmur, rubs or gallops.   Pulmonary/Chest: Effort normal and breath sounds normal. No respiratory distress. No wheezes, rhonchi or rales  Abdominal: Soft. Bowel sounds are normal. Non tender, no masses, no organomegaly or hernias. NO suprapubic tenderness.  No CVAT B.  Skin: Skin is warm and dry. No rash noted. No erythema. No pallor.     UA:  + Nit, trace blood.    A/P:    Encounter Diagnoses   Name Primary?    Type 2 diabetes mellitus with microalbuminuria, without long-term current use of insulin (HCC) - will change back to Metformin but immediate release (500 mg PO BID with food) due to pts sxs on Glipizide.  Pt will follow up in 2-3 mos when he returns from his trip.   Yes    Urinary frequency - UA positive.  Will treat with Keflex as pt is leaving the country tomorrow.  Send urine for culture.  Pt will be using MyChart while he is out of the country.       Dysuria - see above      -  HTN - BP improved on recheck.  No changes at this time.    Code selection for this visit was based on time spent (35 min) on date of service in preparing to see the patient, obtaining and/or reviewing separately obtained history, performing a medically appropriate examination, counseling and educating the patient/family/caregiver, ordering medications or testing, referring and communicating with other healthcare providers, documenting clinical information in the EHR, independently interpreting results and communicating results to the patient/family/caregiver and care coordination with the patient's other providers.      Orders Placed This Encounter   Procedures    Urine Dip, auto without Micro    Urine Culture, Routine [E]       Meds & Refills for this Visit:  Requested Prescriptions     Signed Prescriptions Disp Refills    metFORMIN 500 MG Oral Tab 180 tablet 0     Sig: Take  1 tablet (500 mg total) by mouth 2 (two) times daily with meals.    cephALEXin 250 MG/5ML Oral Recon Susp 140 mL 0     Sig: Take 10 mL (500 mg total) by mouth in the morning and 10 mL (500 mg total) before bedtime. Do all this for 7 days. For 10 days.       Imaging & Consults:  None    Return in about 2 months (around 2/4/2025) for Diabetes.  There are no Patient Instructions on file for this visit.    All questions were answered and the patient understands the plan.

## 2024-12-04 NOTE — TELEPHONE ENCOUNTER
Called [facility] and spoke to Salma. Informed her prescription is for 7 days, not 10. She verbalized understanding.

## 2025-01-12 RX ORDER — OMEPRAZOLE 20 MG/1
20 CAPSULE, DELAYED RELEASE ORAL
Qty: 90 CAPSULE | Refills: 3 | Status: SHIPPED | OUTPATIENT
Start: 2025-01-12

## 2025-01-12 NOTE — TELEPHONE ENCOUNTER
Refill passed per [facility] protocol.    Requested Prescriptions   Pending Prescriptions Disp Refills    OMEPRAZOLE 20 MG Oral Capsule Delayed Release [Pharmacy Med Name: Omeprazole Oral Capsule Delayed Release 20 MG] 90 capsule 3     Sig: TAKE 1 CAPSULE BEFORE BREAKFAST       Gastrointestional Medication Protocol Passed - 1/12/2025  9:23 AM        Passed - In person appointment or virtual visit in the past 12 mos or appointment in next 3 mos     Recent Outpatient Visits              1 month ago Type 2 diabetes mellitus with microalbuminuria, without long-term current use of insulin ([facility])    [address] Justine Russo PA-C    Office Visit    3 months ago Right hand pain    [address]    Office Visit    7 months ago Encounter for annual health examination    [address]    Office Visit    10 months ago [facility]itus of both ears    [facility]    Office Visit    10 months ago Tinnitus, unspecified laterality    [facility]    [facility]          Future Appointments         Provider Department Appt Notes    In 3 weeks Jonny Hansen MD [address] pre op cpe-cataracts 2/17 and 3/3/25-red folder                    Passed - Medication is active on med list             Future Appointments         Provider Department Appt Notes    In 3 weeks Jonny Hansen MD [address] pre op cpe-cataracts 2/17 and 3/3/25-red folder            Recent Outpatient Visits              1 month ago Type 2 diabetes mellitus with microalbuminuria, without long-term current use of insulin ([facility])    [address], Justine Russo PA-C     Office Visit    3 months ago Right hand pain    [facility], [address] [place] [address], Jonny Pimentel MD    Office Visit    7 months ago Encounter for annual health examination    [facility], [address] [place] [address], Jonny Pimentel MD    Office Visit    10 months ago Tinnitus of both ears    [facility], Jelena Sol AUD    Office Visit    10 months ago Tinnitus, unspecified laterality    [facility], Kasie Hutchison MD    Office Visit

## 2025-01-18 DIAGNOSIS — I10 ESSENTIAL HYPERTENSION: ICD-10-CM

## 2025-01-18 DIAGNOSIS — I77.1 TORTUOUS AORTA: ICD-10-CM

## 2025-01-22 RX ORDER — AMLODIPINE BESYLATE 10 MG/1
10 TABLET ORAL DAILY
Qty: 90 TABLET | Refills: 3 | Status: SHIPPED | OUTPATIENT
Start: 2025-01-22

## 2025-01-22 RX ORDER — PRAVASTATIN SODIUM 10 MG
10 TABLET ORAL NIGHTLY
Qty: 90 TABLET | Refills: 3 | Status: SHIPPED | OUTPATIENT
Start: 2025-01-22

## 2025-01-22 RX ORDER — IRBESARTAN 300 MG/1
300 TABLET ORAL NIGHTLY
Qty: 90 TABLET | Refills: 3 | Status: SHIPPED | OUTPATIENT
Start: 2025-01-22

## 2025-01-22 NOTE — TELEPHONE ENCOUNTER
Refill passed per [facility] protocol.    Please review pended refill request as unable to refill due to high/very high drug interaction warning copied here:     High  Allergy/Contraindication: pravastatinReactions: PAIN. No reaction type specified. User documented allergy severity: Medium.  Level 2 with STATINS.  \"Leg pain\"      Requested Prescriptions   Pending Prescriptions Disp Refills    IRBESARTAN 300 MG Oral Tab [Pharmacy Med Name: Irbesartan Oral Tablet 300 MG] 90 tablet 3     Sig: TAKE 1 TABLET EVERY NIGHT       Hypertension Medications Protocol Passed - 1/22/2025 10:11 AM        Passed - CMP or BMP in past 12 months        Passed - Last BP reading less than 140/90     BP Readings from Last 1 Encounters:   12/04/24 138/75               Passed - In person appointment or virtual visit in the past 12 mos or appointment in next 3 mos     Recent Outpatient Visits              1 month ago Type 2 diabetes mellitus with microalbuminuria, without long-term current use of insulin ([facility])    [address] Justine Landry PA-C    Office Visit    3 months ago Right hand pain    [address] Jonny Hansen MD    Office Visit    7 months ago Encounter for annual health examination    [address] Jonny Hansen MD    Office Visit    11 months ago Tinnitus of both ears    [facility]    Office Visit    11 months ago Tinnitus, unspecified laterality    [facility] Kasie Field MD    Office Visit          Future Appointments         Provider Department Appt Notes    In 1 week Jonny Hansen MD [address] pre op cpe-cataracts 2/17 and 3/3/25-red folder                    Passed - EGFRCR or GFRNAA > 50     GFR Evaluation  EGFRCR: 75 , resulted on  10/3/2024          Passed - Medication is active on med list          AMLODIPINE 10 MG Oral Tab [Pharmacy Med Name: amLODIPine Besylate Oral Tablet 10 MG] 90 tablet 3     Sig: TAKE 1 TABLET EVERY DAY       Hypertension Medications Protocol Passed - 1/22/2025 10:11 AM        Passed - CMP or BMP in past 12 months        Passed - Last BP reading less than 140/90     BP Readings from Last 1 Encounters:   12/04/24 138/75               Passed - In person appointment or virtual visit in the past 12 mos or appointment in next 3 mos     Recent Outpatient Visits              1 month ago Type 2 diabetes mellitus with microalbuminuria, without long-term current use of insulin (HCC)    [address] Justine Landry PA-C    Office Visit    3 months ago Right hand pain    [address]    Office Visit    7 months ago Encounter for annual health examination    [address] [place] Jonny Hansen MD    Office Visit    11 months ago [facility] both [facility]    Office Visit    11 months ago Tinnitus, unspecified laterality    [facility]    [facility]          Future Appointments         Provider Department Appt Notes    In 1 week Jonny Hansen MD [address] pre op cpe-cataracts 2/17 and 3/3/25-red folder                    Passed - EGFRCR or GFRNAA > 50     GFR Evaluation  EGFRCR: 75 , resulted on 10/3/2024          Passed - Medication is active on med list          PRAVASTATIN 10 MG Oral Tab [Pharmacy Med Name: Pravastatin Sodium Oral Tablet 10 MG] 90 tablet 3     Sig: TAKE 1 TABLET EVERY NIGHT       Cholesterol Medication Protocol Passed - 1/22/2025 10:11 AM        Passed - ALT < 80     Lab Results   Component Value Date     ALT 22 10/03/2024             Passed - ALT resulted within past year        Passed - Lipid panel within past 12 months     Lab Results   Component Value Date    CHOLEST 192 10/03/2024    TRIG 171 (H) 10/03/2024    HDL 44 10/03/2024     (H) 10/03/2024    VLDL 30 10/03/2024    TCHDLRATIO 4.5 11/04/2013    NONHDLC 148 (H) 10/03/2024             Passed - In person appointment or virtual visit in the past 12 mos or appointment in next 3 mos     Recent Outpatient Visits              1 month ago Type 2 diabetes mellitus with microalbuminuria, without long-term current use of insulin ([facility])    [address] Justine Russo PA-C    Office Visit    3 months ago Right hand pain    [address] Jonny Pimentel MD    Office Visit    7 months ago Encounter for annual health examination    [address] Jonny Pimentel MD    Office Visit    11 months ago [facility]itus of both ears    [facility]    Office Visit    11 months ago Tinnitus, unspecified laterality    [facility] Kasie Hutchison MD    Office Visit          Future Appointments         Provider Department Appt Notes    In 1 week Jonny Hansen MD [address] pre op cpe-cataracts 2/17 and 3/3/25-red folder                    Passed - Medication is active on med list           Future Appointments         Provider Department Appt Notes    In 1 week Jonny Hansen MD [address] pre op cpe-cataracts 2/17 and 3/3/25-red folder          Recent Outpatient Visits              1 month ago Type 2 diabetes mellitus with microalbuminuria, without long-term current use of insulin ([facility])    [address] Justine Russo PA-C    Office  Visit    3 months ago Right hand pain    [facility], [address] [place] [address], Jonny Pimentel MD    Office Visit    7 months ago Encounter for annual health examination    [facility], [address] [place] [address], Jonny Pimentel MD    Office Visit    11 months ago Tinnitus of both ears    [facility], Jelena Sol AUD    Office Visit    11 months ago Tinnitus, unspecified laterality    [facility], Kasie Hutchison MD    Office Visit

## 2025-02-05 ENCOUNTER — TELEPHONE (OUTPATIENT)
Dept: INTERNAL MEDICINE CLINIC | Facility: CLINIC | Age: 80
End: 2025-02-05

## 2025-02-05 DIAGNOSIS — I10 ESSENTIAL HYPERTENSION: ICD-10-CM

## 2025-02-05 DIAGNOSIS — E11.29 TYPE 2 DIABETES MELLITUS WITH MICROALBUMINURIA, WITHOUT LONG-TERM CURRENT USE OF INSULIN (HCC): Primary | ICD-10-CM

## 2025-02-05 DIAGNOSIS — R80.9 TYPE 2 DIABETES MELLITUS WITH MICROALBUMINURIA, WITHOUT LONG-TERM CURRENT USE OF INSULIN (HCC): Primary | ICD-10-CM

## 2025-02-05 DIAGNOSIS — E78.2 MIXED HYPERLIPIDEMIA: ICD-10-CM

## 2025-02-05 DIAGNOSIS — Z12.5 SCREENING FOR PROSTATE CANCER: ICD-10-CM

## 2025-02-05 DIAGNOSIS — Z00.00 ENCOUNTER FOR ANNUAL HEALTH EXAMINATION: ICD-10-CM

## 2025-02-05 NOTE — TELEPHONE ENCOUNTER
Patient called request labs prior to their annual physical.  Annual physical scheduled for   Future Appointments   Date Time Provider Department Center   5/15/2025  1:00 PM Jonny Hansen MD [facility]        Please order labs. Patient preferred lab is [facility]  Patient informed request was sent to clinical team.  Patient informed to fast for labs.  No callback required.

## 2025-02-24 ENCOUNTER — TELEPHONE (OUTPATIENT)
Dept: INTERNAL MEDICINE CLINIC | Facility: CLINIC | Age: 80
End: 2025-02-24

## 2025-02-24 DIAGNOSIS — R80.9 TYPE 2 DIABETES MELLITUS WITH MICROALBUMINURIA, WITHOUT LONG-TERM CURRENT USE OF INSULIN (HCC): Primary | ICD-10-CM

## 2025-02-24 DIAGNOSIS — H25.813 MIXED TYPE AGE-RELATED CATARACT, BOTH EYES: ICD-10-CM

## 2025-02-24 DIAGNOSIS — E11.29 TYPE 2 DIABETES MELLITUS WITH MICROALBUMINURIA, WITHOUT LONG-TERM CURRENT USE OF INSULIN (HCC): Primary | ICD-10-CM

## 2025-02-24 DIAGNOSIS — E11.21 DIABETIC NEPHROPATHY ASSOCIATED WITH TYPE 2 DIABETES MELLITUS (HCC): ICD-10-CM

## 2025-02-24 DIAGNOSIS — H40.1134 PRIMARY OPEN ANGLE GLAUCOMA (POAG) OF BOTH EYES, INDETERMINATE STAGE: ICD-10-CM

## 2025-02-25 ENCOUNTER — OFFICE VISIT (OUTPATIENT)
Dept: INTERNAL MEDICINE CLINIC | Facility: CLINIC | Age: 80
End: 2025-02-25
Payer: MEDICARE

## 2025-02-25 VITALS
OXYGEN SATURATION: 98 % | WEIGHT: 140.63 LBS | SYSTOLIC BLOOD PRESSURE: 128 MMHG | DIASTOLIC BLOOD PRESSURE: 78 MMHG | HEART RATE: 59 BPM | HEIGHT: 68 IN | BODY MASS INDEX: 21.31 KG/M2

## 2025-02-25 DIAGNOSIS — R80.9 TYPE 2 DIABETES MELLITUS WITH MICROALBUMINURIA, WITHOUT LONG-TERM CURRENT USE OF INSULIN (HCC): ICD-10-CM

## 2025-02-25 DIAGNOSIS — H25.813 MIXED TYPE AGE-RELATED CATARACT, BOTH EYES: ICD-10-CM

## 2025-02-25 DIAGNOSIS — I10 ESSENTIAL HYPERTENSION: ICD-10-CM

## 2025-02-25 DIAGNOSIS — Z01.818 PREOP EXAMINATION: Primary | ICD-10-CM

## 2025-02-25 DIAGNOSIS — E11.29 TYPE 2 DIABETES MELLITUS WITH MICROALBUMINURIA, WITHOUT LONG-TERM CURRENT USE OF INSULIN (HCC): ICD-10-CM

## 2025-02-25 PROCEDURE — 1159F MED LIST DOCD IN RCRD: CPT | Performed by: INTERNAL MEDICINE

## 2025-02-25 PROCEDURE — G2211 COMPLEX E/M VISIT ADD ON: HCPCS | Performed by: INTERNAL MEDICINE

## 2025-02-25 PROCEDURE — 3078F DIAST BP <80 MM HG: CPT | Performed by: INTERNAL MEDICINE

## 2025-02-25 PROCEDURE — 3008F BODY MASS INDEX DOCD: CPT | Performed by: INTERNAL MEDICINE

## 2025-02-25 PROCEDURE — 3074F SYST BP LT 130 MM HG: CPT | Performed by: INTERNAL MEDICINE

## 2025-02-25 PROCEDURE — 99214 OFFICE O/P EST MOD 30 MIN: CPT | Performed by: INTERNAL MEDICINE

## 2025-02-25 PROCEDURE — 1160F RVW MEDS BY RX/DR IN RCRD: CPT | Performed by: INTERNAL MEDICINE

## 2025-02-25 NOTE — PROGRESS NOTES
[facility]  PRE OP RISK ASSESSMENT    REASON FOR CONSULT: Pre-op risk assessment for surgical procedure right cataract removal planned for 3/3/2025 with IV sedation anesthesia.    REQUESTING PHYSICIAN: MD Scott    CHIEF COMPLAINT:   Chief Complaint   Patient presents with    Pre-Op     Room 7, AS, pre-op exam.       HISTORY OF PRESENT ILLNESS:   The patient is a 79 year old male who presents for preoperative evaluation.    The patient has a history of bilateral cataracts for which he is scheduled for right cataract removal on 3/3.  History of diabetes with hemoglobin A1c of 7.1%.  He is currently taking metformin 500 mg twice daily with reported fasting blood glucose level this morning of 120.  No acute concerns.    Past Medical History:    Past Medical History:    Constipation    Diabetes ([facility])    Diabetic eye exam ([facility])    [facility]    High blood pressure    Osteoporosis    Unspecified essential hypertension        Past Surgical History:    Past Surgical History:   Procedure Laterality Date    Electrocardiogram, complete  08-    Scanned to Media Tab - Date of Service 08-    Eye surgery      eye pressure       Current Medications:    Current Outpatient Medications   Medication Sig Dispense Refill    Irbesartan 300 MG Oral Tab Take 1 tablet (300 mg total) by mouth nightly. 90 tablet 3    amLODIPine 10 MG Oral Tab Take 1 tablet (10 mg total) by mouth daily. 90 tablet 3    pravastatin 10 MG Oral Tab Take 1 tablet (10 mg total) by mouth nightly. 90 tablet 3    omeprazole 20 MG Oral Capsule Delayed Release Take 1 capsule (20 mg total) by mouth before breakfast. 90 capsule 3    metFORMIN 500 MG Oral Tab Take 1 tablet (500 mg total) by mouth 2 (two) times daily with meals. 180 tablet 0    Accu-Chek Softclix Lancets Does not apply Misc 1 Lancet by Finger stick route 3 (three) times daily. 300 each 3    Blood Glucose Monitoring Suppl (ACCU-CHEK GUIDE) w/Device Does not apply Kit 1  Application 3 (three) times daily as needed. 1 kit 0    Glucose Blood (ACCU-CHEK GUIDE TEST) In Vitro Strip 1 each by Other route 3 (three) times daily. Use 1 test strip three times daily as directed. 300 strip 3    glipiZIDE ER 5 MG Oral Tablet 24 Hr Take 1 tablet (5 mg total) by mouth daily. 90 tablet 3    Alcohol Swabs (DROPSAFE ALCOHOL PREP) 70 % Does not apply Pads 1 Application 3 (three) times daily as needed. 300 each 1    Glucose Blood (ACCU-CHEK GUIDE) In Vitro Strip Use three times daily, as directed 300 strip 3    Blood Glucose Monitoring Suppl (ACCU-CHEK PRATIMA PLUS) w/Device Does not apply Kit 1 Device by Other route 2 (two) times daily. 1 kit 0    timolol 0.5 % Ophthalmic Solution Place 1 drop into both eyes daily.      Glucose Blood (ACCU-CHEK PRATIMA PLUS) In Vitro Strip CHECK BLOOD GLUCOSE TWO TIMES DAILY 200 strip 3    budesonide 0.5 MG/2ML Inhalation Suspension Mix 2 ml (0.5 mg) in honey or apple sauce and swallow twice daily.  Rinse your mouth after taking and spit.  Do not eat or drink for 30 minutes after use. 180 each 3    LATANOPROST 0.005 % Ophthalmic Solution INSTILL 1 DROP IN BOTH EYES EVERY NIGHT 5 mL 0    Blood Glucose Calibration (ACCU-CHEK PRATIMA) In Vitro Solution       Accu-Chek Softclix Lancets Does not apply Misc       Lancets Misc. (ACCU-CHEK SOFTCLIX LANCET DEV) Does not apply Kit           Allergies:    Allergies as of 2025 - Review Complete 2025   Allergen Reaction Noted    Statins PAIN 2020    Codeine UNKNOWN 2014       SOCIAL HISTORY:   Social History     Socioeconomic History    Marital status:      Spouse name: Not on file    Number of children: Not on file    Years of education: Not on file    Highest education level: Not on file   Occupational History    Not on file   Tobacco Use    Smoking status: Former     Current packs/day: 0.00     Types: Cigarettes     Start date: 1974     Quit date: 1976     Years since quittin.7     Smokeless tobacco: Never   Vaping Use    Vaping status: Never Used   Substance and Sexual Activity    Alcohol use: Yes     Alcohol/week: 2.0 standard drinks of alcohol     Types: 2 Glasses of wine per week     Comment: once in a month    Drug use: No    Sexual activity: Not on file   Other Topics Concern     Service Not Asked    Blood Transfusions Not Asked    Caffeine Concern Yes    Occupational Exposure Not Asked    Hobby Hazards Not Asked    Sleep Concern Not Asked    Stress Concern No    Weight Concern No    Special Diet No    Back Care Not Asked    Exercise Yes    Bike Helmet Not Asked    Seat Belt Yes    Self-Exams Not Asked    Grew up on a farm Not Asked    History of tanning Not Asked    Outdoor occupation Not Asked    Reaction to local anesthetic No   Social History Narrative    Not on file     Social Drivers of Health     Food Insecurity: No Food Insecurity (1/29/2024)    Food Insecurity     Food Insecurity: Never true   Transportation Needs: No Transportation Needs (1/29/2024)    Transportation Needs     Lack of Transportation: No   Stress: Not on file   Housing Stability: Low Risk  (1/29/2024)    Housing Stability     Housing Instability: No     Housing Instability Emergency: Not on file     Crib or Bassinette: Not on file        FAMILY HISTORY:   Family History   Problem Relation Age of Onset    Heart Disease Father     Diabetes Mother     Hypertension Mother         REVIEW OF SYSTEMS:    GENERAL: feels well otherwise  SKIN: denies any unusual skin lesions  HEENT: denies blurred vision or double vision denies nasal congestion  LUNGS: denies shortness of breath with exertion  CARDIOVASCULAR: denies chest pain on exertion  GI: denies abdominal pain, denies heartburn  : denies dysuria, urgency, frequency, hematuria  MUSCULOSKELETAL: denies back pain  NEURO: denies headaches    PRE-OP ROS  NSAIDS/PLATELET INH: No  DIABETIC MEDICATIONS: Compliant.  Hold metformin the morning of procedure.  PARI:  No  Hx of VTE: No  BLEEDING DISORDER: No  LOOSE DENTITION OR DENTAL APPLIANCES: Removable dental hardware.  URI, COUGH, CP, FEVER: No  CERVICAL SPINE RESTRICTION: No known.  No history of rheumatoid arthritis.    PHYSICAL EXAM:  /78 (BP Location: Right arm, Patient Position: Sitting, Cuff Size: adult)   Pulse 59   Ht 5' 8\" (1.727 m)   Wt 140 lb 9.6 oz (63.8 kg)   SpO2 98%   BMI 21.38 kg/m²   GENERAL: Well developed, well nourished,in no apparent distress  SKIN: No rashes,no suspicious lesions  EYES: Bilateral conjunctiva are clear  HEENT: atraumatic, normocephalic  NECK: supple,no adenopathy,no bruits  LUNGS: clear to auscultation  CARDIO: RRR without murmur  GI: good BS's,no masses, HSM or tenderness  Bilateral barefoot skin diabetic exam is normal, visualized feet and the appearance is normal.  Bilateral monofilament/sensation of both feet is normal.  Pulsation pedal pulse exam of both lower legs/feet is normal as well.      LABS:  None requested or pending    ASSESSMENT AND PLAN:    1. Preop examination  The patient is at acceptable risk of complications for the planned procedure  Able to achieve at least 4 METS    2. Mixed type age-related cataract, both eyes    3. Type 2 diabetes mellitus with microalbuminuria, without long-term current use of insulin (HCC)  Stable and controlled with hemoglobin A1c of 7.1%.  Hold metformin the morning of procedure.    4. Essential hypertension  Stable and controlled with current blood pressure 128/78.  Continue irbesartan 300 mg daily      Encounter Diagnoses   Name Primary?    Preop examination Yes    Mixed type age-related cataract, both eyes     Type 2 diabetes mellitus with microalbuminuria, without long-term current use of insulin (HCC)     Essential hypertension        No orders of the defined types were placed in this encounter.      Imaging & Consults:  None    Jonny Hansen MD

## 2025-03-04 ENCOUNTER — TELEPHONE (OUTPATIENT)
Dept: INTERNAL MEDICINE CLINIC | Facility: CLINIC | Age: 80
End: 2025-03-04

## 2025-03-04 DIAGNOSIS — E11.29 TYPE 2 DIABETES MELLITUS WITH MICROALBUMINURIA, WITHOUT LONG-TERM CURRENT USE OF INSULIN (HCC): Primary | ICD-10-CM

## 2025-03-04 DIAGNOSIS — R80.9 TYPE 2 DIABETES MELLITUS WITH MICROALBUMINURIA, WITHOUT LONG-TERM CURRENT USE OF INSULIN (HCC): Primary | ICD-10-CM

## 2025-03-04 NOTE — TELEPHONE ENCOUNTER
Triage call transferred.   Spoke with pt's daughter, day (Ok per HIPAA), requesting a referral to [facility] due to complications during cataract surgery. Pt was referred to Dr. Enrique Sanford. Pt has appt scheduled 3/6 @1:45pm.  Informed will relay to PCP referral request. Per daughter, ok to call pt back with referral response.   [facility] fax# [phone]  If any questions, may contact daughter back at new number [phone]. Daughter aware to update consent form.   No further questions or concerns. Daughter  verbalized understanding and agreed with POC.    Ophtha referral pended for your approval if ok. DX?  Please review and advise. Thank you.

## 2025-03-07 ENCOUNTER — LAB ENCOUNTER (OUTPATIENT)
Dept: LAB | Age: 80
End: 2025-03-07
Attending: INTERNAL MEDICINE
Payer: MEDICARE

## 2025-03-07 ENCOUNTER — PATIENT MESSAGE (OUTPATIENT)
Dept: INTERNAL MEDICINE CLINIC | Facility: CLINIC | Age: 80
End: 2025-03-07

## 2025-03-07 DIAGNOSIS — Z00.00 ENCOUNTER FOR ANNUAL HEALTH EXAMINATION: ICD-10-CM

## 2025-03-07 DIAGNOSIS — R80.9 TYPE 2 DIABETES MELLITUS WITH MICROALBUMINURIA, WITHOUT LONG-TERM CURRENT USE OF INSULIN (HCC): ICD-10-CM

## 2025-03-07 DIAGNOSIS — E11.29 TYPE 2 DIABETES MELLITUS WITH MICROALBUMINURIA, WITHOUT LONG-TERM CURRENT USE OF INSULIN (HCC): ICD-10-CM

## 2025-03-07 DIAGNOSIS — I10 ESSENTIAL HYPERTENSION: ICD-10-CM

## 2025-03-07 DIAGNOSIS — E78.2 MIXED HYPERLIPIDEMIA: ICD-10-CM

## 2025-03-07 DIAGNOSIS — Z12.5 SCREENING FOR PROSTATE CANCER: ICD-10-CM

## 2025-03-07 LAB
ALBUMIN SERPL-MCNC: 4.8 G/DL (ref 3.2–4.8)
ALBUMIN/GLOB SERPL: 1.4 {RATIO} (ref 1–2)
ALP LIVER SERPL-CCNC: 73 U/L
ALT SERPL-CCNC: 19 U/L
ANION GAP SERPL CALC-SCNC: 8 MMOL/L (ref 0–18)
AST SERPL-CCNC: 18 U/L (ref ?–34)
BASOPHILS # BLD AUTO: 0.02 X10(3) UL (ref 0–0.2)
BASOPHILS NFR BLD AUTO: 0.3 %
BILIRUB SERPL-MCNC: 0.7 MG/DL (ref 0.2–1.1)
BUN BLD-MCNC: 23 MG/DL (ref 9–23)
CALCIUM BLD-MCNC: 9.7 MG/DL (ref 8.7–10.6)
CHLORIDE SERPL-SCNC: 105 MMOL/L (ref 98–112)
CO2 SERPL-SCNC: 25 MMOL/L (ref 21–32)
CREAT BLD-MCNC: 1.21 MG/DL
CREAT UR-SCNC: 139.3 MG/DL
EGFRCR SERPLBLD CKD-EPI 2021: 61 ML/MIN/1.73M2 (ref 60–?)
EOSINOPHIL # BLD AUTO: 0.25 X10(3) UL (ref 0–0.7)
EOSINOPHIL NFR BLD AUTO: 4.3 %
ERYTHROCYTE [DISTWIDTH] IN BLOOD BY AUTOMATED COUNT: 11.9 %
EST. AVERAGE GLUCOSE BLD GHB EST-MCNC: 157 MG/DL (ref 68–126)
FASTING STATUS PATIENT QL REPORTED: NO
GLOBULIN PLAS-MCNC: 3.4 G/DL (ref 2–3.5)
GLUCOSE BLD-MCNC: 129 MG/DL (ref 70–99)
HBA1C MFR BLD: 7.1 % (ref ?–5.7)
HCT VFR BLD AUTO: 43.4 %
HGB BLD-MCNC: 15 G/DL
IMM GRANULOCYTES # BLD AUTO: 0.02 X10(3) UL (ref 0–1)
IMM GRANULOCYTES NFR BLD: 0.3 %
LYMPHOCYTES # BLD AUTO: 1.82 X10(3) UL (ref 1–4)
LYMPHOCYTES NFR BLD AUTO: 31.5 %
MCH RBC QN AUTO: 31 PG (ref 26–34)
MCHC RBC AUTO-ENTMCNC: 34.6 G/DL (ref 31–37)
MCV RBC AUTO: 89.7 FL
MICROALBUMIN UR-MCNC: 14.7 MG/DL
MICROALBUMIN/CREAT 24H UR-RTO: 105.5 UG/MG (ref ?–30)
MONOCYTES # BLD AUTO: 0.43 X10(3) UL (ref 0.1–1)
MONOCYTES NFR BLD AUTO: 7.5 %
NEUTROPHILS # BLD AUTO: 3.23 X10 (3) UL (ref 1.5–7.7)
NEUTROPHILS # BLD AUTO: 3.23 X10(3) UL (ref 1.5–7.7)
NEUTROPHILS NFR BLD AUTO: 56.1 %
OSMOLALITY SERPL CALC.SUM OF ELEC: 291 MOSM/KG (ref 275–295)
PLATELET # BLD AUTO: 290 10(3)UL (ref 150–450)
POTASSIUM SERPL-SCNC: 4.4 MMOL/L (ref 3.5–5.1)
PROT SERPL-MCNC: 8.2 G/DL (ref 5.7–8.2)
PSA SERPL-MCNC: 1.98 NG/ML (ref ?–4)
RBC # BLD AUTO: 4.84 X10(6)UL
SODIUM SERPL-SCNC: 138 MMOL/L (ref 136–145)
TSI SER-ACNC: 1.78 UIU/ML (ref 0.55–4.78)
WBC # BLD AUTO: 5.8 X10(3) UL (ref 4–11)

## 2025-03-07 PROCEDURE — 84153 ASSAY OF PSA TOTAL: CPT

## 2025-03-07 PROCEDURE — 80053 COMPREHEN METABOLIC PANEL: CPT

## 2025-03-07 PROCEDURE — 82043 UR ALBUMIN QUANTITATIVE: CPT

## 2025-03-07 PROCEDURE — 84443 ASSAY THYROID STIM HORMONE: CPT

## 2025-03-07 PROCEDURE — 36415 COLL VENOUS BLD VENIPUNCTURE: CPT

## 2025-03-07 PROCEDURE — 82570 ASSAY OF URINE CREATININE: CPT

## 2025-03-07 PROCEDURE — 85025 COMPLETE CBC W/AUTO DIFF WBC: CPT

## 2025-03-07 PROCEDURE — 83036 HEMOGLOBIN GLYCOSYLATED A1C: CPT

## 2025-03-10 NOTE — TELEPHONE ENCOUNTER
Spoke with patient, per Dr. Jonny Hansen just saw patient for Pre op.  Patient only needs to have EKG and it is ok to schedule for nurse visit.  Patient scheduled on 3/11/25 with nurse.      Patient also needed a BMP which he states he did on 3/7/25.

## 2025-03-10 NOTE — TELEPHONE ENCOUNTER
Arielle from [facility], Dr. Sanford office wants to have EKG faxed to her asap, as surgery is 3/12.  Arielle needs EKG results faxed to [phone] asa tomorrow.

## 2025-03-11 ENCOUNTER — NURSE ONLY (OUTPATIENT)
Dept: INTERNAL MEDICINE CLINIC | Facility: CLINIC | Age: 80
End: 2025-03-11
Payer: MEDICARE

## 2025-03-11 DIAGNOSIS — Z01.818 PRE-OP EXAM: Primary | ICD-10-CM

## 2025-03-11 LAB
ATRIAL RATE: 60 BPM
P AXIS: 59 DEGREES
P-R INTERVAL: 292 MS
Q-T INTERVAL: 426 MS
QRS DURATION: 132 MS
QTC CALCULATION (BEZET): 426 MS
R AXIS: 101 DEGREES
T AXIS: 48 DEGREES
VENTRICULAR RATE: 60 BPM

## 2025-03-11 PROCEDURE — 93000 ELECTROCARDIOGRAM COMPLETE: CPT

## 2025-03-11 NOTE — TELEPHONE ENCOUNTER
Faxed EKG tracing to Dr. Sanford at [facility], received a confirmed transmission report. Placed in the red folder under the completed tab.

## 2025-03-31 RX ORDER — METFORMIN HYDROCHLORIDE 500 MG/1
500 TABLET, EXTENDED RELEASE ORAL 2 TIMES DAILY WITH MEALS
Qty: 180 TABLET | Refills: 3 | OUTPATIENT
Start: 2025-03-31

## 2025-03-31 NOTE — TELEPHONE ENCOUNTER
Office visit with KHUSHBOO Santamaria on 12/04/2024:   Type 2 diabetes mellitus with microalbuminuria, without long-term current use of insulin (HCC) - will change back to Metformin but immediate release (500 mg PO BID with food) due to pts sxs on Glipizide.  Pt will follow up in 2-3 mos when he returns from his trip.      Office visit with Dr. Hansen on 02/25/2025:  He is currently taking metformin 500 mg twice daily with reported fasting blood glucose level this morning of 120. No acute concerns.    Discontinued Metformin ER 500mg in Chart

## 2025-04-18 ENCOUNTER — TELEPHONE (OUTPATIENT)
Dept: ADMINISTRATIVE | Age: 80
End: 2025-04-18

## 2025-04-18 DIAGNOSIS — Z01.00 ENCOUNTER FOR COMPLETE EYE EXAM: ICD-10-CM

## 2025-04-18 DIAGNOSIS — S05.01XA CORNEA ABRASION, RIGHT, INITIAL ENCOUNTER: Primary | ICD-10-CM

## 2025-04-18 NOTE — TELEPHONE ENCOUNTER
Patient request  Referral  to see Ophthalmology(Irais)please review and sign plan and care if you agree Thank you.                              Endy NAVARRO            [facility].

## 2025-04-21 ENCOUNTER — TELEPHONE (OUTPATIENT)
Dept: ADMINISTRATIVE | Age: 80
End: 2025-04-21

## 2025-04-21 DIAGNOSIS — H18.001: Primary | ICD-10-CM

## 2025-04-21 NOTE — TELEPHONE ENCOUNTER
Patient request  Referral  to see ophthalmology(Justina)please review and sign plan and care if you agree Thank you.                              Endy NAVARRO            [facility].

## 2025-04-21 NOTE — TELEPHONE ENCOUNTER
MADIHA MENDOZA O. Pt relays he had cataract surgery in the right eye on 3/3/25. Post op, he had particle and lens swelling inside the cornea and there was damage. He was referred to another specialist (Dr. Enrique Sanford) to clean out the particles. Now, he is being referred to another cornea specialist. Pt requesting referral for ophtalmology cornea specialist ([facility]):  Gene Braxton  [address], [address] [place] [address]  Phone: [phone]  Fax: [phone]  Referral pended.    Pt also asking for referral to [facility]. Pt does not want to return to Dr. Chavez.

## 2025-04-30 ENCOUNTER — OFFICE VISIT (OUTPATIENT)
Dept: INTERNAL MEDICINE CLINIC | Facility: CLINIC | Age: 80
End: 2025-04-30
Payer: MEDICARE

## 2025-04-30 ENCOUNTER — NURSE TRIAGE (OUTPATIENT)
Dept: INTERNAL MEDICINE CLINIC | Facility: CLINIC | Age: 80
End: 2025-04-30

## 2025-04-30 VITALS
HEIGHT: 68 IN | HEART RATE: 62 BPM | OXYGEN SATURATION: 97 % | DIASTOLIC BLOOD PRESSURE: 70 MMHG | RESPIRATION RATE: 18 BRPM | BODY MASS INDEX: 21.82 KG/M2 | TEMPERATURE: 97 F | WEIGHT: 144 LBS | SYSTOLIC BLOOD PRESSURE: 122 MMHG

## 2025-04-30 DIAGNOSIS — R30.0 DYSURIA: Primary | ICD-10-CM

## 2025-04-30 LAB
APPEARANCE: CLEAR
BILIRUBIN: NEGATIVE
GLUCOSE (URINE DIPSTICK): NEGATIVE MG/DL
KETONES (URINE DIPSTICK): NEGATIVE MG/DL
LEUKOCYTES: NEGATIVE
MULTISTIX EXPIRATION DATE: ABNORMAL DATE
MULTISTIX LOT#: ABNORMAL NUMERIC
NITRITE, URINE: POSITIVE
PH, URINE: 5 (ref 4.5–8)
PROTEIN (URINE DIPSTICK): 100 MG/DL
SPECIFIC GRAVITY: 1.02 (ref 1–1.03)
UROBILINOGEN,SEMI-QN: 0.2 MG/DL (ref 0–1.9)

## 2025-04-30 PROCEDURE — 3008F BODY MASS INDEX DOCD: CPT

## 2025-04-30 PROCEDURE — 81003 URINALYSIS AUTO W/O SCOPE: CPT

## 2025-04-30 PROCEDURE — 87086 URINE CULTURE/COLONY COUNT: CPT

## 2025-04-30 PROCEDURE — 3074F SYST BP LT 130 MM HG: CPT

## 2025-04-30 PROCEDURE — 1159F MED LIST DOCD IN RCRD: CPT

## 2025-04-30 PROCEDURE — 1160F RVW MEDS BY RX/DR IN RCRD: CPT

## 2025-04-30 PROCEDURE — 3078F DIAST BP <80 MM HG: CPT

## 2025-04-30 PROCEDURE — 99214 OFFICE O/P EST MOD 30 MIN: CPT

## 2025-04-30 RX ORDER — NITROFURANTOIN 25; 75 MG/1; MG/1
100 CAPSULE ORAL 2 TIMES DAILY
Qty: 14 CAPSULE | Refills: 0 | Status: SHIPPED | OUTPATIENT
Start: 2025-04-30

## 2025-04-30 NOTE — PROGRESS NOTES
Matthew Marks is a 79 year old male.   Chief Complaint   Patient presents with    UTI     EJ Rm 16b- Pt is here for possible uti for the past 3 days, pt state has burning when he urinate and  low back pain off and on     HPI:      History of Present Illness  Matthew Marks is a 79 year old male with diabetes who presents with urinary symptoms suggestive of a urinary tract infection.     He experiences increased urinary frequency and discomfort with urination. He denies testicular pain.  A similar episode occurred in December, with pain despite a negative culture. Antibiotics were prescribed at that time, which initially helped, but were discontinued when he traveled abroad.    He is currently taking Azo for urinary pain, which causes his urine to turn orange. He ensures adequate hydration and avoids sugary drinks, opting for sugar-free options. He denies body aches, chills, fever, and reports normal appetite and daytime urination.     He discusses a recent eye issue following cataract surgery, where complications led to a second procedure to remove lens particles. He is currently unable to see clearly from the affected eye and is using eye drops. He has a history of glaucoma surgery and is concerned about the lack of healing in his eye.     Allergies:  Allergies[1]   Current Meds:  Current Medications[2]     PMH:   Past Medical History[3]    ROS:   Review of Systems   Constitutional: Negative.    Respiratory: Negative.     Genitourinary:  Positive for dysuria and frequency. Negative for hematuria.   Musculoskeletal: Negative.         PHYSICAL EXAM:    /70   Pulse 62   Temp 96.7 °F (35.9 °C) (Temporal)   Resp 18   Ht 5' 8\" (1.727 m)   Wt 144 lb (65.3 kg)   SpO2 97%   BMI 21.90 kg/m²   Physical Exam  Constitutional:       Appearance: Normal appearance. He is not ill-appearing or toxic-appearing.   Cardiovascular:      Pulses: Normal pulses.      Heart sounds: Normal heart sounds.   Pulmonary:       Effort: Pulmonary effort is normal.      Breath sounds: Normal breath sounds.   Neurological:      Mental Status: He is alert.   Psychiatric:         Mood and Affect: Mood normal.          ASSESSMENT/ PLAN:     Assessment & Plan  Urinary tract infection  Recurrent UTI with urinary frequency and discomfort with urination. Previous negative culture but symptomatic improvement with antibiotics. Current findings suggest infection.  - macrobid BID x 7 days.  - Order urine culture and review results in 18-72 hours.  - Advise increased water intake, avoid sugary drinks.  - Instruct urination every 3-4 hours.  - Consider urologist referral if symptoms persist.    Cataract surgery complications  Post-surgical complications with retained lens particles. Managed by [facility]. Vision issues and irritation due to stitches. Potential improvement with glasses.  -Due for DM eye exam       Health Maintenance Due   Topic Date Due    Zoster Vaccines (1 of 2) Never done    Diabetes Care Dilated Eye Exam  06/03/2022    COVID-19 Vaccine (4 - 2024-25 season) 09/01/2024    Annual Well Visit  01/01/2025            The following individual(s) verbally consented to be recorded using ambient AI listening technology and understand that they can each withdraw their consent to this listening technology at any point by asking the clinician to turn off or pause the recording:    Patient name: Matthew Marks    Pt indicates understanding and agrees to the plan.     No follow-ups on file.    JENNIFER Perez          [1]   Allergies  Allergen Reactions    Statins PAIN     Leg pain    Codeine UNKNOWN   [2]   Current Outpatient Medications   Medication Sig Dispense Refill    metFORMIN 500 MG Oral Tab Take 1 tablet (500 mg total) by mouth 2 (two) times daily with meals. 180 tablet 0    Irbesartan 300 MG Oral Tab Take 1 tablet (300 mg total) by mouth nightly. 90 tablet 3    amLODIPine 10 MG Oral Tab Take 1 tablet (10 mg total) by  mouth daily. 90 tablet 3    pravastatin 10 MG Oral Tab Take 1 tablet (10 mg total) by mouth nightly. 90 tablet 3    omeprazole 20 MG Oral Capsule Delayed Release Take 1 capsule (20 mg total) by mouth before breakfast. 90 capsule 3    Accu-Chek Softclix Lancets Does not apply Misc 1 Lancet by Finger stick route 3 (three) times daily. 300 each 3    Blood Glucose Monitoring Suppl (ACCU-CHEK GUIDE) w/Device Does not apply Kit 1 Application 3 (three) times daily as needed. 1 kit 0    Glucose Blood (ACCU-CHEK GUIDE TEST) In Vitro Strip 1 each by Other route 3 (three) times daily. Use 1 test strip three times daily as directed. 300 strip 3    glipiZIDE ER 5 MG Oral Tablet 24 Hr Take 1 tablet (5 mg total) by mouth daily. 90 tablet 3    Alcohol Swabs (DROPSAFE ALCOHOL PREP) 70 % Does not apply Pads 1 Application 3 (three) times daily as needed. 300 each 1    Glucose Blood (ACCU-CHEK GUIDE) In Vitro Strip Use three times daily, as directed 300 strip 3    Blood Glucose Monitoring Suppl (ACCU-CHEK PRATIMA PLUS) w/Device Does not apply Kit 1 Device by Other route 2 (two) times daily. 1 kit 0    timolol 0.5 % Ophthalmic Solution Place 1 drop into both eyes daily.      Glucose Blood (ACCU-CHEK PRATIMA PLUS) In Vitro Strip CHECK BLOOD GLUCOSE TWO TIMES DAILY 200 strip 3    budesonide 0.5 MG/2ML Inhalation Suspension Mix 2 ml (0.5 mg) in honey or apple sauce and swallow twice daily.  Rinse your mouth after taking and spit.  Do not eat or drink for 30 minutes after use. 180 each 3    LATANOPROST 0.005 % Ophthalmic Solution INSTILL 1 DROP IN BOTH EYES EVERY NIGHT 5 mL 0    Blood Glucose Calibration (ACCU-CHEK PRATIMA) In Vitro Solution       Accu-Chek Softclix Lancets Does not apply Misc       Lancets Misc. (ACCU-CHEK SOFTCLIX LANCET DEV) Does not apply Kit      [3]   Past Medical History:   Constipation    Diabetes ([facility])    Diabetic eye exam ([facility])    [facility]    High blood pressure    Osteoporosis    Unspecified essential  hypertension

## 2025-04-30 NOTE — TELEPHONE ENCOUNTER
Action Requested: Summary for Provider     []  Critical Lab, Recommendations Needed  [] Need Additional Advice  []   FYI    []   Need Orders  [] Need Medications Sent to Pharmacy  []  Other     SUMMARY: Scheduled OV today with BD for evaluation.   Future Appointments   Date Time Provider Department Center   4/30/2025 11:00 AM Divina Singh APRN [facility]   5/15/2025  1:00 PM Jonny Hansen MD [facility]     Reason for call: Burning On Urination  Onset: 2-3 days   Reason for Disposition   Side (flank) or lower back pain present    Protocols used: Urination Pain - Male-A-OH    C/o burning with urination for 2-3 days   Also c/o R side back pain occasionally   Denies blood in urine and fever  Hx of UTI last year  Urinating more frequently at night time  Notified needs OV for eval. Pt agreeable to come in. Apt accepted.

## 2025-05-07 ENCOUNTER — PATIENT MESSAGE (OUTPATIENT)
Dept: FAMILY MEDICINE CLINIC | Facility: CLINIC | Age: 80
End: 2025-05-07

## 2025-05-08 NOTE — TELEPHONE ENCOUNTER
See Result Note communication.  Referral to Urology entered, pt already scheduled.    Future Appointments   Date Time Provider Department Center   5/12/2025  2:00 PM [facility]

## 2025-05-12 ENCOUNTER — OFFICE VISIT (OUTPATIENT)
Dept: SURGERY | Facility: CLINIC | Age: 80
End: 2025-05-12
Payer: MEDICARE

## 2025-05-12 VITALS
HEIGHT: 67 IN | WEIGHT: 144 LBS | HEART RATE: 76 BPM | BODY MASS INDEX: 22.6 KG/M2 | SYSTOLIC BLOOD PRESSURE: 163 MMHG | DIASTOLIC BLOOD PRESSURE: 74 MMHG

## 2025-05-12 DIAGNOSIS — N40.1 BPH WITH OBSTRUCTION/LOWER URINARY TRACT SYMPTOMS: Primary | ICD-10-CM

## 2025-05-12 DIAGNOSIS — R82.90 ABNORMAL URINALYSIS: ICD-10-CM

## 2025-05-12 DIAGNOSIS — N13.8 BPH WITH OBSTRUCTION/LOWER URINARY TRACT SYMPTOMS: Primary | ICD-10-CM

## 2025-05-12 PROCEDURE — 3078F DIAST BP <80 MM HG: CPT | Performed by: UROLOGY

## 2025-05-12 PROCEDURE — 3008F BODY MASS INDEX DOCD: CPT | Performed by: UROLOGY

## 2025-05-12 PROCEDURE — 99204 OFFICE O/P NEW MOD 45 MIN: CPT | Performed by: UROLOGY

## 2025-05-12 PROCEDURE — 3077F SYST BP >= 140 MM HG: CPT | Performed by: UROLOGY

## 2025-05-12 PROCEDURE — 1159F MED LIST DOCD IN RCRD: CPT | Performed by: UROLOGY

## 2025-05-12 RX ORDER — TAMSULOSIN HYDROCHLORIDE 0.4 MG/1
0.4 CAPSULE ORAL
Qty: 30 CAPSULE | Refills: 3 | Status: SHIPPED | OUTPATIENT
Start: 2025-05-12

## 2025-05-12 NOTE — PROGRESS NOTES
[facility] Urology  Initial Office Consultation    HPI:   Matthew Marks is a 79 year old male here today for consultation at the request of, and a copy of this note will be sent to, Jonny Hansen MD. Accompanied by his wife.    1.  BPH with LUTS  Patient with burning with urination only at the nighttime.  He does not report the symptoms during the daytime.  He also reports nocturia x 2-3.  Stream is fair.    Seen by his PCP.  He had a couple dipstick UAs that showed nitrites and trace blood but no leukocytes.  Urine cultures have been negative.    He was treated with nitrofurantoin without improvement in symptoms.    Bladder scan today shows a PVR of 144 mL.    He denies gross hematuria.  No flank pain.    PSA level from 2025 normal at 1.98 ng/mL.    - 2025 consult: Start Flomax.  Send urine for microscopy.  Evaluation if microhematuria noted.      Past Medical History:    Constipation    Diabetes ([facility])    Diabetic eye exam ([facility])    [facility]    High blood pressure    Osteoporosis    Unspecified essential hypertension     Past Surgical History:   Procedure Laterality Date    Electrocardiogram, complete  2012    Scanned to Media Tab - Date of Service 2012    Eye surgery      eye pressure     Family History   Problem Relation Age of Onset    Heart Disease Father     Diabetes Mother     Hypertension Mother      Social History     Socioeconomic History    Marital status:    Tobacco Use    Smoking status: Former     Current packs/day: 0.00     Types: Cigarettes     Start date: 1974     Quit date: 1976     Years since quittin.9    Smokeless tobacco: Never   Vaping Use    Vaping status: Never Used   Substance and Sexual Activity    Alcohol use: Yes     Alcohol/week: 2.0 standard drinks of alcohol     Types: 2 Glasses of wine per week     Comment: once in a month    Drug use: No   Other Topics Concern    Caffeine Concern Yes    Stress Concern No     Weight Concern No    Special Diet No    Exercise Yes    Seat Belt Yes    Reaction to local anesthetic No     Social Drivers of Health     Food Insecurity: No Food Insecurity (4/30/2025)    NCSS - Food Insecurity     Worried About Running Out of Food in the Last Year: No     Ran Out of Food in the Last Year: No   Transportation Needs: No Transportation Needs (4/30/2025)    NCSS - Transportation     Lack of Transportation: No   Housing Stability: Not At Risk (4/30/2025)    NCSS - Housing/Utilities     Has Housing: Yes     Worried About Losing Housing: No     Unable to Get Utilities: No     Allergies: Statins and Codeine      REVIEW OF SYSTEMS:  Pertinent positives and negatives per HPI. A 12-point ROS was performed and is otherwise negative.       EXAM:  BP (!) 163/74 (BP Location: Left arm, Patient Position: Sitting, Cuff Size: adult)   Pulse 76   Ht 5' 7\" (1.702 m)   Wt 144 lb (65.3 kg)   BMI 22.55 kg/m²     Physical Exam  Constitutional:       Appearance: He is well-developed.   HENT:      Head: Normocephalic.   Eyes:      General: No scleral icterus.  Cardiovascular:      Rate and Rhythm: Normal rate.   Pulmonary:      Effort: Pulmonary effort is normal.   Skin:     General: Skin is warm and dry.   Neurological:      Mental Status: He is alert and oriented to person, place, and time.   Psychiatric:         Mood and Affect: Mood normal.         Behavior: Behavior normal.       LABS:  See HPI.      IMAGING:  No results found.      IMPRESSION:  79 year old male with LUTS, suggestive of BPH.  Dipstick UA with trace blood.    Mildly elevated PVR.    Reviewed with patient and wife.    Management options reviewed.  Discussed with Darrian trial of an alpha-blocker such as tamsulosin to see if his symptoms improve as well as his bladder emptying.  Benefits, risks, and side effects reviewed.  He is agreeable.    Dipstick urinalysis from PCP showing trace blood.  No microscopy performed.  Recommend sending urine for  microscopic evaluation.  If microhematuria noted, further evaluation would be recommended.    All questions answered.      PLAN:  1.  Start Flomax 0.4 mg daily.    2.  Send urine for microscopy.  If microscopic hematuria noted, further evaluation would be recommended.    Follow-up in 3 months for an updated IPSS and bladder scan/PVR.    Magan Solis MD  5/12/2025

## 2025-05-13 ENCOUNTER — TELEPHONE (OUTPATIENT)
Dept: INTERNAL MEDICINE CLINIC | Facility: CLINIC | Age: 80
End: 2025-05-13

## 2025-05-15 ENCOUNTER — OFFICE VISIT (OUTPATIENT)
Dept: INTERNAL MEDICINE CLINIC | Facility: CLINIC | Age: 80
End: 2025-05-15
Payer: MEDICARE

## 2025-05-15 VITALS
HEIGHT: 67 IN | SYSTOLIC BLOOD PRESSURE: 126 MMHG | HEART RATE: 75 BPM | WEIGHT: 145 LBS | DIASTOLIC BLOOD PRESSURE: 76 MMHG | OXYGEN SATURATION: 96 % | BODY MASS INDEX: 22.76 KG/M2 | RESPIRATION RATE: 16 BRPM | TEMPERATURE: 98 F

## 2025-05-15 DIAGNOSIS — I77.1 TORTUOUS AORTA: ICD-10-CM

## 2025-05-15 DIAGNOSIS — E11.29 TYPE 2 DIABETES MELLITUS WITH MICROALBUMINURIA, WITHOUT LONG-TERM CURRENT USE OF INSULIN (HCC): ICD-10-CM

## 2025-05-15 DIAGNOSIS — E78.5 DYSLIPIDEMIA WITH ELEVATED LOW DENSITY LIPOPROTEIN (LDL) CHOLESTEROL AND ABNORMALLY LOW HIGH DENSITY LIPOPROTEIN CHOLESTEROL: ICD-10-CM

## 2025-05-15 DIAGNOSIS — E11.21 DIABETIC NEPHROPATHY ASSOCIATED WITH TYPE 2 DIABETES MELLITUS (HCC): ICD-10-CM

## 2025-05-15 DIAGNOSIS — I10 ESSENTIAL HYPERTENSION: ICD-10-CM

## 2025-05-15 DIAGNOSIS — R93.1 AGATSTON CAC SCORE 100-199: ICD-10-CM

## 2025-05-15 DIAGNOSIS — H93.13 TINNITUS OF BOTH EARS: ICD-10-CM

## 2025-05-15 DIAGNOSIS — Z96.1 PRESENCE OF INTRAOCULAR LENS IN ANTERIOR CHAMBER: ICD-10-CM

## 2025-05-15 DIAGNOSIS — H40.1134 PRIMARY OPEN ANGLE GLAUCOMA (POAG) OF BOTH EYES, INDETERMINATE STAGE: ICD-10-CM

## 2025-05-15 DIAGNOSIS — Z00.00 ENCOUNTER FOR ANNUAL HEALTH EXAMINATION: Primary | ICD-10-CM

## 2025-05-15 DIAGNOSIS — K20.80 LYMPHOCYTIC ESOPHAGITIS: ICD-10-CM

## 2025-05-15 DIAGNOSIS — R80.9 TYPE 2 DIABETES MELLITUS WITH MICROALBUMINURIA, WITHOUT LONG-TERM CURRENT USE OF INSULIN (HCC): ICD-10-CM

## 2025-05-15 DIAGNOSIS — M85.89 OSTEOPENIA OF MULTIPLE SITES: ICD-10-CM

## 2025-05-15 DIAGNOSIS — L80 VITILIGO: ICD-10-CM

## 2025-05-15 DIAGNOSIS — H25.813 MIXED TYPE AGE-RELATED CATARACT, BOTH EYES: ICD-10-CM

## 2025-05-15 DIAGNOSIS — K21.9 GASTROESOPHAGEAL REFLUX DISEASE, UNSPECIFIED WHETHER ESOPHAGITIS PRESENT: ICD-10-CM

## 2025-05-15 DIAGNOSIS — E78.2 MIXED HYPERLIPIDEMIA: ICD-10-CM

## 2025-05-15 PROCEDURE — 3074F SYST BP LT 130 MM HG: CPT | Performed by: INTERNAL MEDICINE

## 2025-05-15 PROCEDURE — 99214 OFFICE O/P EST MOD 30 MIN: CPT | Performed by: INTERNAL MEDICINE

## 2025-05-15 PROCEDURE — 1159F MED LIST DOCD IN RCRD: CPT | Performed by: INTERNAL MEDICINE

## 2025-05-15 PROCEDURE — 1170F FXNL STATUS ASSESSED: CPT | Performed by: INTERNAL MEDICINE

## 2025-05-15 PROCEDURE — 3008F BODY MASS INDEX DOCD: CPT | Performed by: INTERNAL MEDICINE

## 2025-05-15 PROCEDURE — G0439 PPPS, SUBSEQ VISIT: HCPCS | Performed by: INTERNAL MEDICINE

## 2025-05-15 PROCEDURE — 1160F RVW MEDS BY RX/DR IN RCRD: CPT | Performed by: INTERNAL MEDICINE

## 2025-05-15 PROCEDURE — 3078F DIAST BP <80 MM HG: CPT | Performed by: INTERNAL MEDICINE

## 2025-05-15 PROCEDURE — 96160 PT-FOCUSED HLTH RISK ASSMT: CPT | Performed by: INTERNAL MEDICINE

## 2025-05-15 PROCEDURE — 1125F AMNT PAIN NOTED PAIN PRSNT: CPT | Performed by: INTERNAL MEDICINE

## 2025-05-15 PROCEDURE — 99499 UNLISTED E&M SERVICE: CPT | Performed by: INTERNAL MEDICINE

## 2025-05-15 NOTE — PROGRESS NOTES
Subjective:   Matthew Marks is a 79 year old male who presents for a MA AHA (Medicare Advantage Annual Health Assessment) and Medicare Subsequent Annual Wellness visit (Pt already had Initial Annual Wellness) and scheduled follow up of multiple significant but stable problems.   History of Present Illness      The patient continues to experience a discomfort and visual impairment of the right eye.  He has maintained regular follow-up with the ophthalmology service, and has seen a subspecialist.  He is hopeful that his symptoms may gradually improved.  Furthermore, he has initiated tamsulosin and following 2 doses he reports significant improvement in his symptoms of difficulty voiding urine.  Postvoid residual was 144 cc.  He  previously awakes once every 2 hours overnight to void urine, and now has made it through the last 2 nights only with once or twice voiding.  Blood glucose control has remained stable and favorable.  No further acute concerns at this time.      History/Other:   Fall Risk Assessment:   He has been screened for Falls and is High Risk. Fall Prevention information provided to patient in After Visit Summary.    Do you feel unsteady when standing or walking?: Yes (at times feels out of balance)  Do you worry about falling?: Yes  Have you fallen in the past year?: No     Cognitive Assessment:   He had a completely normal cognitive assessment - see flowsheet entries     Functional Ability/Status:   Matthew Marks has some abnormal functions as listed below:  He has Vision problems based on screening of functional status. He has problems with Memory based on screening of functional status.       Depression Screening (PHQ):  PHQ-2 SCORE: 1  , done 5/15/2025   Little interest or pleasure in doing things: 1    If you checked off any problems, how difficult have these problems made it for you to do your work, take care of things at home, or get along with other people?: Not difficult at  all         5 minutes spent screening and counseling for depression    Advanced Directives:   He does have a Living Will but we do NOT have it on file in Epic.    He does have a POA but we do NOT have it on file in Epic.    Discussed Advance Care Planning with patient (and family/surrogate if present). Standard forms made available to patient in After Visit Summary.      Patient Active Problem List   Diagnosis    Type 2 diabetes mellitus with microalbuminuria, without long-term current use of insulin (HCC)    Essential hypertension    Diabetic nephropathy associated with type 2 diabetes mellitus (HCC)    Tinnitus of both ears    Vitiligo    Lymphocytic esophagitis    Agatston CAC score 100-199    Osteopenia of multiple sites    Primary open angle glaucoma (POAG) of both eyes, indeterminate stage    Mixed hyperlipidemia    Mixed type age-related cataract, both eyes    Presence of intraocular lens in anterior chamber     Allergies:  He is allergic to statins and codeine.    Current Medications:  Outpatient Medications Marked as Taking for the 5/15/25 encounter (Office Visit) with Jonny Hansen MD   Medication Sig    metFORMIN 500 MG Oral Tab Take 1 tablet (500 mg total) by mouth 2 (two) times daily with meals.    tamsulosin 0.4 MG Oral Cap Take 1 capsule (0.4 mg total) by mouth After dinner. Take 1/2 hour following the same meal each day    Irbesartan 300 MG Oral Tab Take 1 tablet (300 mg total) by mouth nightly.    amLODIPine 10 MG Oral Tab Take 1 tablet (10 mg total) by mouth daily.    omeprazole 20 MG Oral Capsule Delayed Release Take 1 capsule (20 mg total) by mouth before breakfast.    Accu-Chek Softclix Lancets Does not apply Misc 1 Lancet by Finger stick route 3 (three) times daily.    Glucose Blood (ACCU-CHEK GUIDE TEST) In Vitro Strip 1 each by Other route 3 (three) times daily. Use 1 test strip three times daily as directed.    Alcohol Swabs (DROPSAFE ALCOHOL PREP) 70 % Does not apply Pads 1 Application 3  (three) times daily as needed.    Blood Glucose Monitoring Suppl (ACCU-CHEK PRATIMA PLUS) w/Device Does not apply Kit 1 Device by Other route 2 (two) times daily.    timolol 0.5 % Ophthalmic Solution Place 1 drop into both eyes daily.    budesonide 0.5 MG/2ML Inhalation Suspension Mix 2 ml (0.5 mg) in honey or apple sauce and swallow twice daily.  Rinse your mouth after taking and spit.  Do not eat or drink for 30 minutes after use.    LATANOPROST 0.005 % Ophthalmic Solution INSTILL 1 DROP IN BOTH EYES EVERY NIGHT    Blood Glucose Calibration (ACCU-CHEK PRATIMA) In Vitro Solution        Medical History:  He  has a past medical history of Constipation (hard), Diabetes ([facility]), Diabetic eye exam ([facility]) (2020), High blood pressure, Osteoporosis (), and Unspecified essential hypertension.  Surgical History:  He  has a past surgical history that includes electrocardiogram, complete (2012); Eye surgery; and cataract (Right, 2025).   Family History:  His family history includes Diabetes in his mother; Heart Disease in his father; Hypertension in his mother.  Social History:  He  reports that he quit smoking about 48 years ago. His smoking use included cigarettes. He started smoking about 50 years ago. He has never used smokeless tobacco. He reports current alcohol use of about 2.0 standard drinks of alcohol per week. He reports that he does not use drugs.    Tobacco:  He smoked tobacco in the past but quit greater than 12 months ago.  Social History     Tobacco Use   Smoking Status Former    Current packs/day: 0.00    Types: Cigarettes    Start date: 1974    Quit date: 1976    Years since quittin.9   Smokeless Tobacco Never        CAGE Alcohol Screen:   CAGE screening score of 0 on 5/15/2025, showing low risk of alcohol abuse.      Patient Care Team:  Jonny Hansen MD as PCP - General (Internal Medicine)  August Cardoso DO (INFECTIOUS DISEASES)  Divina Singh APRN (Nurse Practitioner  Family)    Review of Systems  GENERAL: feels well otherwise  SKIN: denies any unusual skin lesions  EYES: denies blurred vision or double vision  HEENT: denies nasal congestion, sinus pain or ST  LUNGS: denies shortness of breath with exertion  CARDIOVASCULAR: denies chest pain on exertion  GI: denies abdominal pain, denies heartburn  : 2 per night nocturia, no complaint of urinary incontinence  MUSCULOSKELETAL: denies back pain  NEURO: denies headaches  PSYCHE: denies depression or anxiety  HEMATOLOGIC: denies hx of anemia  ENDOCRINE: denies thyroid history  ALL/ASTHMA: denies hx of allergy or asthma    Objective:   Physical Exam  General Appearance:  Alert, cooperative, no distress, appears stated age   Head:  Normocephalic, without obvious abnormality, atraumatic   Eyes:  Bilateral conjunctiva within normal limits   Ears:  Tympanic membrane within normal limits bilaterally   Nose: Deferred   Throat: Deferred   Neck: Supple, symmetrical, trachea midline, no adenopathy, thyroid: not enlarged, symmetric, no tenderness/mass/nodules, no carotid bruit or JVD   Back:   Symmetric, no curvature, ROM normal, no CVA tenderness   Lungs:   Clear to auscultation bilaterally, respirations unlabored   Chest Wall:  No tenderness or deformity   Heart:  Regular rate and rhythm, S1, S2 normal, no murmur, rub or gallop   Abdomen:   Soft, non-tender, bowel sounds active all four quadrants,  no masses, no organomegaly   Genitalia: Deferred   Rectal: Deferred   Extremities: No edema   Pulses: 2+ and symmetric   Skin: Skin color, texture, turgor normal, no rashes or lesions   Lymph nodes: Cervical nodes normal   Neurologic: Grossly normal     /76   Pulse 75   Temp 98 °F (36.7 °C) (Temporal)   Resp 16   Ht 5' 7\" (1.702 m)   Wt 145 lb (65.8 kg)   SpO2 96%   BMI 22.71 kg/m²  Estimated body mass index is 22.71 kg/m² as calculated from the following:    Height as of this encounter: 5' 7\" (1.702 m).    Weight as of this  encounter: 145 lb (65.8 kg).    Medicare Hearing Assessment:   Hearing Screening    Time taken: 5/15/2025  1:08 PM  Screening Method: Finger Rub  Finger Rub Result: Pass         Visual Acuity:   Right Eye Visual Acuity: Corrected (Pt had failed cataract sx 3/3/25 and vision is still blurry)     Left Eye Visual Acuity: Corrected Left Eye Chart Acuity: 20/40   Both Eyes Visual Acuity: Corrected Both Eyes Chart Acuity: 20/30   Able To Tolerate Visual Acuity: Yes        Assessment & Plan:   Matthew Marks is a 79 year old male who presents for a Medicare Assessment.     1. Primary open angle glaucoma (POAG) of both eyes, indeterminate stage (Primary)  -     Ophthalmology Referral - In Network  2. Presence of intraocular lens in anterior chamber  -     Ophthalmology Referral - In Network  3. Encounter for annual health examination  Other orders  -     metFORMIN HCl; Take 1 tablet (500 mg total) by mouth 2 (two) times daily with meals.  Dispense: 180 tablet; Refill: 3    Assessment & Plan    Outstanding screening and preventive measures:  Shingles immunization: to be obtained from pharmacy    Glaucoma and cataracts, bilaterally:  Post removal on right; complicated by visual impairment and ocular discomfort vs pain  Following with ophthalmology service regularly    BPH with LUTS:  Improved with use of tamsulosin   Following with urology service     DM2:  Improving cntrol, with HbA1c of 7.1%; continue metformin er 500 mg BID.   Microalbuminuria: continue irbesartan  Mixed hyperlipidemia with LDL above goal: continue pravastatin 10 mg daily  Dietary management reinforced.    Lymphocytic esophagitis and GERD:  Stable   Continue oral PPI therapy and inhaled steroid therapy  Following with GI service regularly      Hypertension:  Stable/controlled  Continue irbesartan 300 mg daily and amlodipine 10 mg daily      External hemorrhoid:  Topical anusol ordered  Continue supplemental fiber     Osteopenia:  RF: chronic oral  steroid and oral PPI therapies  Declined bisphosphonate therapy at this time     Vitiligo:  Stable  Following with dermatology service    The patient indicates understanding of these issues and agrees to the plan.  Reinforced healthy diet, lifestyle, and exercise.      Return in 6 months (on 11/15/2025).     Jonny Hansen MD, 5/15/2025     Supplementary Documentation:   General Health:  In the past six months, have you lost more than 10 pounds without trying?: 2 - No  Has your appetite been poor?: No  Type of Diet: Balanced  How does the patient maintain a good energy level?: Daily Walks  How would you describe your daily physical activity?: Moderate  How would you describe your current health state?: Fair  How do you maintain positive mental well-being?: Social Interaction, Games, Visiting Family  On a scale of 0 to 10, with 0 being no pain and 10 being severe pain, what is your pain level?: 5 - (Moderate) (eye pain)  In the past six months, have you experienced urine leakage?: 0-No  At any time do you feel concerned for the safety/well-being of yourself and/or your children, in your home or elsewhere?: No  Have you had any immunizations at another office such as Influenza, Hepatitis B, Tetanus, or Pneumococcal?: No    Health Maintenance   Topic Date Due    Zoster Vaccines (1 of 2) Never done    Diabetes Care Dilated Eye Exam  06/03/2022    COVID-19 Vaccine (4 - 2024-25 season) 09/01/2024    Annual Well Visit  01/01/2025    Diabetes Care A1C  09/07/2025    Influenza Vaccine (Season Ended) 10/01/2025    Diabetes Care Foot Exam  02/25/2026    Diabetes Care: GFR  03/07/2026    Annual Depression Screening  Completed    Fall Risk Screening (Annual)  Completed    Diabetes Care: Microalb/Creat Ratio (Annual)  Completed    Pneumococcal Vaccine: 50+ Years  Completed    Meningococcal B Vaccine  Aged Out

## 2025-08-12 ENCOUNTER — TELEPHONE (OUTPATIENT)
Dept: SURGERY | Facility: CLINIC | Age: 80
End: 2025-08-12

## 2025-08-12 ENCOUNTER — OFFICE VISIT (OUTPATIENT)
Dept: SURGERY | Facility: CLINIC | Age: 80
End: 2025-08-12

## 2025-08-12 DIAGNOSIS — R82.90 ABNORMAL URINALYSIS: ICD-10-CM

## 2025-08-12 DIAGNOSIS — N40.1 BPH WITH OBSTRUCTION/LOWER URINARY TRACT SYMPTOMS: Primary | ICD-10-CM

## 2025-08-12 DIAGNOSIS — N13.8 BPH WITH OBSTRUCTION/LOWER URINARY TRACT SYMPTOMS: Primary | ICD-10-CM

## 2025-08-12 PROCEDURE — 99213 OFFICE O/P EST LOW 20 MIN: CPT | Performed by: PHYSICIAN ASSISTANT

## 2025-08-12 PROCEDURE — 1160F RVW MEDS BY RX/DR IN RCRD: CPT | Performed by: PHYSICIAN ASSISTANT

## 2025-08-12 PROCEDURE — 1159F MED LIST DOCD IN RCRD: CPT | Performed by: PHYSICIAN ASSISTANT

## 2025-08-12 RX ORDER — TAMSULOSIN HYDROCHLORIDE 0.4 MG/1
0.4 CAPSULE ORAL NIGHTLY
Qty: 90 CAPSULE | Refills: 3 | Status: SHIPPED | OUTPATIENT
Start: 2025-08-12 | End: 2026-08-07

## 2025-08-15 ENCOUNTER — TELEPHONE (OUTPATIENT)
Dept: SURGERY | Facility: CLINIC | Age: 80
End: 2025-08-15

## 2025-08-15 RX ORDER — SULFAMETHOXAZOLE AND TRIMETHOPRIM 800; 160 MG/1; MG/1
1 TABLET ORAL 2 TIMES DAILY
Qty: 14 TABLET | Refills: 0 | Status: SHIPPED | OUTPATIENT
Start: 2025-08-15 | End: 2025-08-22

## 2025-08-18 ENCOUNTER — TELEPHONE (OUTPATIENT)
Dept: SURGERY | Facility: CLINIC | Age: 80
End: 2025-08-18

## 2025-08-29 ENCOUNTER — TELEPHONE (OUTPATIENT)
Dept: SURGERY | Facility: CLINIC | Age: 80
End: 2025-08-29

## (undated) DIAGNOSIS — Z13.9 ENCOUNTER FOR SCREENING: Primary | ICD-10-CM

## (undated) DEVICE — 3 ML SYRINGE LUER-LOCK TIP: Brand: MONOJECT

## (undated) DEVICE — 35 ML SYRINGE REGULAR TIP: Brand: MONOJECT

## (undated) DEVICE — MEDI-VAC NON-CONDUCTIVE SUCTION TUBING 6MM X 1.8M (6FT.) L: Brand: CARDINAL HEALTH

## (undated) DEVICE — Device: Brand: DEFENDO AIR/WATER/SUCTION AND BIOPSY VALVE

## (undated) DEVICE — SAVARY GILLIARD WIRE GUIDE: Brand: SAVARY GILLIARD

## (undated) DEVICE — CONMED SCOPE SAVER BITE BLOCK, 20X27 MM: Brand: SCOPE SAVER

## (undated) DEVICE — Device: Brand: CUSTOM PROCEDURE KIT

## (undated) DEVICE — FORCEP RADIAL JAW 4

## (undated) DEVICE — 6 ML SYRINGE LUER-LOCK TIP: Brand: MONOJECT

## (undated) DEVICE — DURACLIP 11MM 235CM

## (undated) DEVICE — LINE MNTR ADLT SET O2 INTMD

## (undated) DEVICE — SNARE ENDOSCOPIC 10MM ROUND

## (undated) NOTE — LETTER
07/07/20        [address]      Dear Syl Cisneros,    [address] [facility] records indicate that you have outstanding lab work and or testing that was ordered for you and has not yet been completed:  Orders Placed This Encount

## (undated) NOTE — LETTER
10/07/20        [address] [facility] [id number]      Dear Liliana Combs,    [address] [facility] records indicate that you have outstanding lab work and or testing that was ordered for you and has not yet been completed:  Orders Placed This En

## (undated) NOTE — LETTER
[address] [facility] [id number]  [phone]    07/30/20          Roselyn Pham  [address]  [address]          Patient: Brina Salvador   Date of Birth:

## (undated) NOTE — MR AVS SNAPSHOT
[facility] [address]  [address], [address]  [address] [facility]  [phone]               Thank you for choosing us for your health care visit with Verline Cancer. DO Alfredo.   We are glad to serve you and happy to provide you with this cedillo Commonly known as:  NORVASC           * Irbesartan-Hydrochlorothiazide 300-12.5 MG Tabs   Take 1 tablet by mouth daily. What changed:  Another medication with the same name was added. Make sure you understand how and when to take each.            Fe [employer] Educational Information     Your blood pressure indicates you may be at-risk for Hypertension. Please consider the following Lifestyle Modifications. Also, please return for a follow-up Blood Pressure Check in 1 month.      Lifestyle Modification R

## (undated) NOTE — LETTER
01/25/21        [address] [facility] [id number]      Dear Jonnathan Martin,    [address] [facility] records indicate that you have outstanding lab work and or testing that was ordered for you and has not yet been completed:  Orders Placed This En

## (undated) NOTE — LETTER
10/24/20        [address] [facility] [phone]      Dear Glenis Dan,    [address] [facility] records indicate that you have outstanding lab work and or testing that was ordered for you and has not yet been completed:  Orders Placed This En

## (undated) NOTE — LETTER
10/08/19      [address]      Dear Rommel Bah,    [address] [facility] records indicate that you have outstanding lab work and or testing that was ordered for you and has not yet been completed:  Orders Placed This Encounter

## (undated) NOTE — MR AVS SNAPSHOT
[facility] [address]  [address]  [address] [facility]  [phone]               Thank you for choosing us for your health care visit with Carla Church. DO Alfredo.   We are glad to serve you and happy to provide you with this cedillo Imaging:  US ABDOMINAL AORTIC ANEURYSM SCREENING (BDE=[id number])    Instructions:   To schedule a test at any [facility], call Central Scheduling at [phone], Monday through Friday between 7:30am to 6pm and on Saturday between authorization, such as [place], please feel free to schedule your appointment immediately. However, if you are unsure about the requirements for authorization, please wait 5-7 days and then contact your physician's office.  At that time, you will • History of gestational diabetes or birth of baby weighing more than 9 pounds     Covered at least every 3 years,           GLUCOSE (mg/dL)   Date Value   03/04/2017 134*   11/04/2013 121*   ----------  GLUCOSE (P) (mg/dL)   Date Value   08/15/2016 124*  Americans over age 72 No flowsheet data found.  OK to schedule if you are in this risk group, make sure you have a referral   Prostate Cancer Screening      PSA  Annually to 75 then as needed or HARJIT annually to 75 PSA due on 08/11/2017  No results information including definitions of the different types of Advance Directives. It also has the State forms available on it's website for anyone to review and print using their home computer and printer. (the forms are also available in [address])  www. [email or web address] Date Value   03/04/2017 190   08/15/2016 170   11/04/2013 199     TRIGLYCERIDES (mg/dL)   Date Value   03/04/2017 131   08/15/2016 141   11/04/2013 190*        EKG - covered if needed at Welcome to Medicare, and non-screening if indicated for medical reaso -PNEUMOCOCCAL VACC, 13 ANTHONY IM    Please get once after your 65th birthday    Pneumococcal 23 (Pneumovax)  Covered Once after 65 No orders found for this or any previous visit.  Please get once after your 65th birthday    Hepatitis B for Moderate/High Risk N 117/68 mmHg 74 5' 6\" (1.676 m) 161 lb (73.029 kg) 26.00 kg/m2         Current Medications          This list is accurate as of: 5/2/17  1:53 PM.  Always use your most recent med list.                AmLODIPine Besylate 5 MG Tabs   Take 1 tablet (5 mg tot Fully enjoy your food when eating. Don’t eat while distracted and slow down. Avoid over sized portions. Don’t eat while when you’re bored.      EAT THESE FOODS MORE OFTEN: EAT THESE FOODS LESS OFTEN:   Make half your plate fruits and vegetables Highly

## (undated) NOTE — LETTER
08/03/21        [address] [place] [id number]      Dear Ck Joshi,    1579 [facility] records indicate that you have outstanding lab work and or testing that was ordered for you and has not yet been completed:  Orders Placed This Encoun

## (undated) NOTE — LETTER
Jens Cortés, [address]  [address]  [address] [facility] [address]  [address]       02/24/21        Patient: Leanne Dupree   YOB: 1945   Date of Visit: 2/24/2021       Dear  Dr. Jessie Murray MD,      Thank you for referring Romina Gilliland On physical exam initial blood pressure 140/82. Repeat was 130/80 with a pulse of 62 and a weight 140 pounds. His neck was supple without JVD. Lungs were clear. Heart revealed a regular rate and rhythm with an S4 but no other gallops, murmurs or rubs.

## (undated) NOTE — LETTER
01/07/21        [address] [facility] [address] [phone]      Dear Virginia Haile,    [address] [facility] records indicate that you have outstanding lab work and or testing that was ordered for you and has not yet been completed:  Orders Placed This En